# Patient Record
Sex: FEMALE | Race: WHITE | NOT HISPANIC OR LATINO | Employment: OTHER | URBAN - METROPOLITAN AREA
[De-identification: names, ages, dates, MRNs, and addresses within clinical notes are randomized per-mention and may not be internally consistent; named-entity substitution may affect disease eponyms.]

---

## 2017-01-24 ENCOUNTER — OFFICE VISIT (OUTPATIENT)
Dept: OBGYN | Facility: CLINIC | Age: 22
End: 2017-01-24
Payer: COMMERCIAL

## 2017-01-24 VITALS
HEART RATE: 68 BPM | WEIGHT: 147 LBS | SYSTOLIC BLOOD PRESSURE: 110 MMHG | BODY MASS INDEX: 27.75 KG/M2 | HEIGHT: 61 IN | DIASTOLIC BLOOD PRESSURE: 66 MMHG

## 2017-01-24 DIAGNOSIS — N93.8 DUB (DYSFUNCTIONAL UTERINE BLEEDING): Primary | ICD-10-CM

## 2017-01-24 PROCEDURE — 99213 OFFICE O/P EST LOW 20 MIN: CPT | Performed by: OBSTETRICS & GYNECOLOGY

## 2017-01-24 RX ORDER — NORETHINDRONE ACETATE AND ETHINYL ESTRADIOL .03; 1.5 MG/1; MG/1
1 TABLET ORAL DAILY
Qty: 112 TABLET | Refills: 3 | Status: SHIPPED | OUTPATIENT
Start: 2017-01-24 | End: 2017-12-31

## 2017-01-24 NOTE — PROGRESS NOTES
SUBJECTIVE:                                                   Vanessa Turcios is a 21 year old female who presents to clinic today for the following health issue(s):  Patient presents with:  RECHECK: here to follow up on BC-has been having headaches on most days.      HPI:  Patient had iud for 2 yrs, lot of pain and breakthrough bleeding so was removed  Then tried apri but lots of breakthrough bleeding and anxiety, horowitz & headaches  Tried trisprintec but constant bleeding but headaches gone  Then sprintec but now bleeding and daily headaches    Discussed options  Declines nuvaring, patch, paraguard, depoprovera and implanon  Doesn't want just condoms     We are left with trying another ocp and continuous to see if we can stop the bleeding  Prescription for loestrin 1.5/30  Daily     If signif bleeding, stop for 3 days only then resume    Student at  chiropractic school    Patient's last menstrual period was 2016..   Patient is sexually active, .  Using oral contraceptives for contraception.    reports that she has never smoked. She has never used smokeless tobacco.    STD testing offered?  Declined    Health maintenance updated:  yes    Today's PHQ-2 Score:   PHQ-2 (  [employer]) 2017   Q1: Little interest or pleasure in doing things 0   Q2: Feeling down, depressed or hopeless 0   PHQ-2 Score 0     Today's PHQ-9 Score:   PHQ-9 SCORE 2016   Total Score 2     Today's BRITTANY-7 Score:   BRITTANY-7 SCORE 2016   Total Score 0       Problem list and histories reviewed & adjusted, as indicated.  Additional history: as documented.    Patient Active Problem List   Diagnosis     Genital herpes     Uses oral contraception     Past Surgical History   Procedure Laterality Date     No history of surgery        Social History   Substance Use Topics     Smoking status: Never Smoker      Smokeless tobacco: Never Used     Alcohol Use: No      Problem (# of Occurrences) Relation (Name,Age of Onset)    Colon  "Cancer (1) Maternal Grandmother    DIABETES (1) Maternal Grandmother            Current Outpatient Prescriptions   Medication Sig     norethindrone-ethinyl estradiol (MICROGESTIN 1.5/30) 1.5-30 MG-MCG per tablet Take 1 tablet by mouth daily Without placebo week, fill 4 pkg for 3 months     norgestimate-ethinyl estradiol (ORTHO-CYCLEN, SPRINTEC) 0.25-35 MG-MCG per tablet Take 1 tablet by mouth daily     valACYclovir (VALTREX) 500 MG tablet Take 1 tablet (500 mg) by mouth 2 times daily (Patient taking differently: Take 500 mg by mouth as needed )     No current facility-administered medications for this visit.     No Known Allergies    ROS:  12 point review of systems negative other than symptoms noted below.  Genitourinary: Irregular Menses  Neurologic: Headaches    OBJECTIVE:     /66 mmHg  Pulse 68  Ht 5' 0.5\" (1.537 m)  Wt 147 lb (66.679 kg)  BMI 28.23 kg/m2  LMP 01/13/2016  Body mass index is 28.23 kg/(m^2).    Exam:  Constitutional:  Appearance: Well nourished, well developed alert, in no acute distress     In-Clinic Test Results:  No results found for this or any previous visit (from the past 24 hour(s)).    ASSESSMENT/PLAN:                                                        ICD-10-CM    1. DUB (dysfunctional uterine bleeding) N93.8 norethindrone-ethinyl estradiol (MICROGESTIN 1.5/30) 1.5-30 MG-MCG per tablet       There are no Patient Instructions on file for this visit.    Face to face time 15 minute   100% counceling  Discussed multiple contraceptive options  Discussed hormonal manipulation and DUB    Missy Yoo MD  [facility]    "

## 2017-05-05 ENCOUNTER — TELEPHONE (OUTPATIENT)
Dept: OBGYN | Facility: CLINIC | Age: 22
End: 2017-05-05

## 2017-05-09 NOTE — TELEPHONE ENCOUNTER
Called pt back. Informed her VM box was full so we were unable to LM. Pt stated she had called because she had been on a new OCP x 3 months and started having hot flashes that were worsening the last month. Pt states the past 2 days however have been better. Informed pt it can take time for body to get use to new hormone with OCPs, and laloley her body is adjusting to the new OCP. Informed pt if symptoms come back or worsen to call back. Pt stated understanding and had no further questions.

## 2017-06-03 ENCOUNTER — HEALTH MAINTENANCE LETTER (OUTPATIENT)
Age: 22
End: 2017-06-03

## 2017-12-31 DIAGNOSIS — N93.8 DUB (DYSFUNCTIONAL UTERINE BLEEDING): ICD-10-CM

## 2018-01-02 RX ORDER — NORETHINDRONE ACETATE AND ETHINYL ESTRADIOL 1.5; 3 MG/1; UG/1
TABLET ORAL
Qty: 28 TABLET | Refills: 0 | Status: SHIPPED | OUTPATIENT
Start: 2018-01-02 | End: 2018-01-30

## 2018-01-02 NOTE — TELEPHONE ENCOUNTER
JUNEL 1.5/30      Last Written Prescription Date:  1/24/17  Last Fill Quantity: 112,   # refills: 3  Last Office Visit: 1/27/17-DUB raimundo, 12/2/16-annual  Future Office visit:   none    Medication is being filled for 1 time refill only due to:  Patient needs to be seen because it has been more than one year since last visit.   Pt due for annual, no appt scheduled. One month extension sent per [place] protocol.

## 2018-01-21 DIAGNOSIS — N93.8 DUB (DYSFUNCTIONAL UTERINE BLEEDING): ICD-10-CM

## 2018-01-23 RX ORDER — NORETHINDRONE ACETATE AND ETHINYL ESTRADIOL 1.5; 3 MG/1; UG/1
TABLET ORAL
Qty: 21 TABLET | Refills: 0 | OUTPATIENT
Start: 2018-01-23

## 2018-01-23 NOTE — TELEPHONE ENCOUNTER
JUNEL 1.5/30      Last Written Prescription Date:  1/2/18  Last Fill Quantity: 28,   # refills: 0  Last Office Visit: 12/2/16  Future Office visit:   NONE    Pt due for annual, no appt scheduled. Pt already received one month extension. Rx denied.

## 2018-01-30 ENCOUNTER — OFFICE VISIT (OUTPATIENT)
Dept: OBGYN | Facility: CLINIC | Age: 23
End: 2018-01-30
Payer: COMMERCIAL

## 2018-01-30 VITALS
DIASTOLIC BLOOD PRESSURE: 76 MMHG | BODY MASS INDEX: 29.45 KG/M2 | WEIGHT: 156 LBS | SYSTOLIC BLOOD PRESSURE: 122 MMHG | HEIGHT: 61 IN

## 2018-01-30 DIAGNOSIS — Z11.8 SCREENING FOR CHLAMYDIAL DISEASE: ICD-10-CM

## 2018-01-30 DIAGNOSIS — N93.8 DUB (DYSFUNCTIONAL UTERINE BLEEDING): ICD-10-CM

## 2018-01-30 DIAGNOSIS — A60.04 HERPES SIMPLEX VULVOVAGINITIS: ICD-10-CM

## 2018-01-30 DIAGNOSIS — Z01.419 ENCOUNTER FOR GYNECOLOGICAL EXAMINATION WITHOUT ABNORMAL FINDING: Primary | ICD-10-CM

## 2018-01-30 PROCEDURE — 87491 CHLMYD TRACH DNA AMP PROBE: CPT | Performed by: OBSTETRICS & GYNECOLOGY

## 2018-01-30 PROCEDURE — 99395 PREV VISIT EST AGE 18-39: CPT | Performed by: OBSTETRICS & GYNECOLOGY

## 2018-01-30 RX ORDER — VALACYCLOVIR HYDROCHLORIDE 500 MG/1
500 TABLET, FILM COATED ORAL 2 TIMES DAILY
Qty: 6 TABLET | Refills: 3 | Status: SHIPPED | OUTPATIENT
Start: 2018-01-30 | End: 2020-07-10

## 2018-01-30 RX ORDER — NORETHINDRONE ACETATE AND ETHINYL ESTRADIOL .03; 1.5 MG/1; MG/1
TABLET ORAL
Qty: 112 TABLET | Refills: 4 | Status: SHIPPED | OUTPATIENT
Start: 2018-01-30 | End: 2019-02-04

## 2018-01-30 ASSESSMENT — ANXIETY QUESTIONNAIRES
3. WORRYING TOO MUCH ABOUT DIFFERENT THINGS: NOT AT ALL
1. FEELING NERVOUS, ANXIOUS, OR ON EDGE: SEVERAL DAYS
6. BECOMING EASILY ANNOYED OR IRRITABLE: NOT AT ALL
7. FEELING AFRAID AS IF SOMETHING AWFUL MIGHT HAPPEN: NOT AT ALL
5. BEING SO RESTLESS THAT IT IS HARD TO SIT STILL: NOT AT ALL
GAD7 TOTAL SCORE: 1
IF YOU CHECKED OFF ANY PROBLEMS ON THIS QUESTIONNAIRE, HOW DIFFICULT HAVE THESE PROBLEMS MADE IT FOR YOU TO DO YOUR WORK, TAKE CARE OF THINGS AT HOME, OR GET ALONG WITH OTHER PEOPLE: NOT DIFFICULT AT ALL
2. NOT BEING ABLE TO STOP OR CONTROL WORRYING: NOT AT ALL

## 2018-01-30 ASSESSMENT — PATIENT HEALTH QUESTIONNAIRE - PHQ9: 5. POOR APPETITE OR OVEREATING: NOT AT ALL

## 2018-01-30 NOTE — MR AVS SNAPSHOT
"              After Visit Summary   2018    Vanessa Turcios    MRN: [id number]           Patient Information     Date Of Birth          1995        Visit Information        Provider Department      2018 9:30 AM Missy Yoo MD [facility]        Today's Diagnoses     Encounter for gynecological examination without abnormal finding    -  1    DUB (dysfunctional uterine bleeding)        Herpes simplex vulvovaginitis        Screening for chlamydial disease           Follow-ups after your visit        Who to contact     If you have questions or need follow up information about today's clinic visit or your schedule please contact [facility] directly at [phone].  Normal or non-critical lab and imaging results will be communicated to you by MyChart, letter or phone within 4 business days after the clinic has received the results. If you do not hear from us within 7 days, please contact the clinic through MyChart or phone. If you have a critical or abnormal lab result, we will notify you by phone as soon as possible.  Submit refill requests through [employer] or call your pharmacy and they will forward the refill request to us. Please allow 3 business days for your refill to be completed.          Additional Information About Your Visit        MyChart Information     [employer] lets you send messages to your doctor, view your test results, renew your prescriptions, schedule appointments and more. To sign up, go to [email or web address] . Click on \"Log in\" on the left side of the screen, which will take you to the Welcome page. Then click on \"Sign up Now\" on the right side of the page.     You will be asked to enter the access code listed below, as well as some personal information. Please follow the directions to create your username and password.     Your access code is: [email or web address]  Expires: 2018 10:13 AM     Your access code will  in 90 days. If " "you need help or a new code, please call your [place] clinic or [phone].        Care EveryWhere ID     This is your Care EveryWhere ID. This could be used by other organizations to access your [place] medical records  [id number]        Your Vitals Were     Height Last Period Breastfeeding? BMI (Body Mass Index)          5' 1.25\" (1.556 m) 07/02/2017 (Approximate) No 29.24 kg/m2         Blood Pressure from Last 3 Encounters:   01/30/18 122/76   01/24/17 110/66   12/02/16 118/72    Weight from Last 3 Encounters:   01/30/18 156 lb (70.8 kg)   01/24/17 147 lb (66.7 kg)   12/02/16 145 lb (65.8 kg)              We Performed the Following     CHLAMYDIA TRACHOMATIS PCR          Today's Medication Changes          These changes are accurate as of 1/30/18 10:14 AM.  If you have any questions, ask your nurse or doctor.               These medicines have changed or have updated prescriptions.        Dose/Directions    norethindrone-ethinyl estradiol 1.5-30 MG-MCG per tablet   Commonly known as:  JUNEL 1.5/30   This may have changed:  See the new instructions.   Used for:  DUB (dysfunctional uterine bleeding)   Changed by:  Missy Yoo MD        TAKE 1 TABLET BY MOUTH DAILY WITHOUT PLACEBO WEEK   Quantity:  112 tablet   Refills:  4       valACYclovir 500 MG tablet   Commonly known as:  VALTREX   This may have changed:  See the new instructions.   Used for:  Herpes simplex vulvovaginitis   Changed by:  Missy Yoo MD        Dose:  500 mg   Take 1 tablet (500 mg) by mouth 2 times daily   Quantity:  6 tablet   Refills:  3            Where to get your medicines      These medications were sent to [place] [id number] [facility] [place] - [address]  [address] [place] [address]     Phone:  [phone]     norethindrone-ethinyl estradiol 1.5-30 MG-MCG per tablet    valACYclovir 500 MG tablet                Primary Care Provider Office Phone # Fax #    Missy Yoo [phone] [facility] [address]  [facility] [address]        Equal Access to Services     JOSE WRIGHT : Hadii aad ku hadjoangordo Tamayo, wachristine pittman, namitapawel moralezmadavid alex, deisy workman. So [facility] [phone].    ATENCIÓN: Si habla español, tiene a mims disposición servicios gratuitos de asistencia lingüística. Llame al [phone].    We comply with applicable federal civil rights laws and [place] laws. We do not discriminate on the basis of race, color, national origin, age, disability, sex, sexual orientation, or gender identity.            Thank you!     Thank you for choosing [facility]  for your care. Our goal is always to provide you with excellent care. Hearing back from our patients is one way we can continue to improve our services. Please take a few minutes to complete the written survey that you may receive in the mail after your visit with us. Thank you!             Your Updated Medication List - Protect others around you: Learn how to safely use, store and throw away your medicines at www.disposemymeds.org.          This list is accurate as of 1/30/18 10:14 AM.  Always use your most recent med list.                   Brand Name Dispense Instructions for use Diagnosis    norethindrone-ethinyl estradiol 1.5-30 MG-MCG per tablet    JUNEL 1.5/30    112 tablet    TAKE 1 TABLET BY MOUTH DAILY WITHOUT PLACEBO WEEK    DUB (dysfunctional uterine bleeding)       valACYclovir 500 MG tablet    VALTREX    6 tablet    Take 1 tablet (500 mg) by mouth 2 times daily    Herpes simplex vulvovaginitis

## 2018-01-30 NOTE — PROGRESS NOTES
Vanessa is a 22 year old  female who presents for annual exam.     Besides routine health maintenance, she has no other health concerns today .    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  Her current contraception method is: oral contraceptives.  She  reports that she has never smoked. She has never used smokeless tobacco.    Patient is sexually active.  STD testing offered?  Declined  Last PHQ-9 score on record =   PHQ-9 SCORE 2018   Total Score 0     Last GAD7 score on record =   BRITTANY-7 SCORE 2018   Total Score 1     Alcohol Score = 3    HEALTH MAINTENANCE:  Cholesterol: (No results found for: CHOL NA  Last Mammo: NA, Result: not applicable, Next Mammo: NA   Pap: (  Lab Results   Component Value Date    PAP NIL 2016 WNL   Colonoscopy:  NA, Result: not applicable, Next Colonoscopy: NA years.  Dexa:  NA    Health maintenance updated:  yes    HISTORY:  Obstetric History       T0      L0     SAB0   TAB0   Ectopic0   Multiple0   Live Births0           Patient Active Problem List   Diagnosis     Genital herpes     Uses oral contraception     Past Surgical History:   Procedure Laterality Date     NO HISTORY OF SURGERY        Social History   Substance Use Topics     Smoking status: Never Smoker     Smokeless tobacco: Never Used     Alcohol use No      Problem (# of Occurrences) Relation (Name,Age of Onset)    Colon Cancer (1) Maternal Grandmother    DIABETES (1) Maternal Grandmother            Current Outpatient Prescriptions   Medication Sig     JUNEL 1.5/30 1.5-30 MG-MCG per tablet TAKE 1 TABLET BY MOUTH DAILY WITHOUT PLACEBO WEEK     valACYclovir (VALTREX) 500 MG tablet TAKE ONE TABLET BY MOUTH  "TWICE DAILY     No current facility-administered medications for this visit.      No Known Allergies    Past medical, surgical, social and family histories were reviewed and updated in EPIC.    ROS:   Genitourinary: Cramps, No Periods and Spotting  Skin: Rash and Skin Dryness    EXAM:  /76  Ht 5' 1.25\" (1.556 m)  Wt 156 lb (70.8 kg)  LMP 07/02/2017 (Approximate)  Breastfeeding? No  BMI 29.24 kg/m2   BMI: Body mass index is 29.24 kg/(m^2).    PHYSICAL EXAM:  Constitutional:  Appearance: Well nourished, well developed, alert, in no acute distress  Neck:  Lymph Nodes:  No lymphadenopathy present    Thyroid:  Gland size normal, nontender, no nodules or masses present  on palpation  Chest:  Respiratory Effort:  Breathing unlabored  Cardiovascular:    Heart: Auscultation:  Regular rate, normal rhythm, no murmurs present  Breasts: Inspection of Breasts:  No lymphadenopathy present., Palpation of Breasts and Axillae:  No masses present on palpation, no breast tenderness., Axillary Lymph Nodes:  No lymphadenopathy present. and No nodularity, asymmetry or nipple discharge bilaterally.  Gastrointestinal:   Abdominal Examination:  Abdomen nontender to palpation, tone normal without rigidity or guarding, no masses present, umbilicus without lesions   Liver and Spleen:  No hepatomegaly present, liver nontender to palpation    Hernias:  No hernias present  Lymphatic: Lymph Nodes:  No other lymphadenopathy present  Skin:  General Inspection:  No rashes present, no lesions present, no areas of  discoloration    Genitalia and Groin:  No rashes present, no lesions present, no areas of  discoloration, no masses present  Neurologic/Psychiatric:    Mental Status:  Oriented X3     Pelvic Exam:  External Genitalia:     Normal appearance for age, no discharge present, no tenderness present, no inflammatory lesions present, color normal  Vagina:     Normal vaginal vault without central or paravaginal defects, no discharge present, " no inflammatory lesions present, no masses present  Bladder:     Nontender to palpation  Urethra:   Urethral Body:  Urethra palpation normal, urethra structural support normal   Urethral Meatus:  No erythema or lesions present  Cervix:     Appearance healthy, no lesions present, nontender to palpation, no bleeding present  Uterus:     Uterus: firm, normal sized and nontender, anteverted in position.   Adnexa:     No adnexal tenderness present, no adnexal masses present  Perineum:     Perineum within normal limits, no evidence of trauma, no rashes or skin lesions present  Anus:     Anus within normal limits, no hemorrhoids present  Inguinal Lymph Nodes:     No lymphadenopathy present  Pubic Hair:     Normal pubic hair distribution for age  Genitalia and Groin:     No rashes present, no lesions present, no areas of discoloration, no masses present      COUNSELING:   Reviewed preventive health counseling, as reflected in patient instructions       Regular exercise       Healthy diet/nutrition    BMI: Body mass index is 29.24 kg/(m^2).  Weight management plan: Patient referred to endocrine and/or weight management specialty    ASSESSMENT:  22 year old female with satisfactory annual exam.    ICD-10-CM    1. Encounter for gynecological examination without abnormal finding Z01.419        PLAN:  See HPI    Missy Yoo MD

## 2018-01-31 LAB
C TRACH DNA SPEC QL NAA+PROBE: NEGATIVE
SPECIMEN SOURCE: NORMAL

## 2018-01-31 ASSESSMENT — PATIENT HEALTH QUESTIONNAIRE - PHQ9: SUM OF ALL RESPONSES TO PHQ QUESTIONS 1-9: 0

## 2018-01-31 ASSESSMENT — ANXIETY QUESTIONNAIRES: GAD7 TOTAL SCORE: 1

## 2019-01-16 DIAGNOSIS — N93.8 DUB (DYSFUNCTIONAL UTERINE BLEEDING): ICD-10-CM

## 2019-01-17 RX ORDER — NORETHINDRONE ACETATE AND ETHINYL ESTRADIOL .03; 1.5 MG/1; MG/1
TABLET ORAL
Qty: 441 TABLET | Refills: 0 | OUTPATIENT
Start: 2019-01-17

## 2019-01-17 NOTE — TELEPHONE ENCOUNTER
Clinic Action Needed:No/FYI only    Reason for Call: Vanessa called tonight requesting a refill on her BC pills.  She is due for an office visit/annual physical on 1/30/2019.  She is headed to [place] tomorrow for a long weekend and needs her refill tonight.  Her pharmacy that requested the refill has a broken printer and cannot process or fill any scripts tonight.  Phoned in a one month keeley refill (as previously written in Epic) per RN refill protocol to [address].  Verbal order accepted by pharmacist Rick.  Notified Vanessa a one month supply was phoned in to pharmacy, advised to make an appointment ASAP.      Routed to: WE Triage    Nichole Acuna RN  [facility]

## 2019-02-04 ENCOUNTER — OFFICE VISIT (OUTPATIENT)
Dept: OBGYN | Facility: CLINIC | Age: 24
End: 2019-02-04
Payer: COMMERCIAL

## 2019-02-04 VITALS
WEIGHT: 151.8 LBS | HEIGHT: 60 IN | SYSTOLIC BLOOD PRESSURE: 110 MMHG | DIASTOLIC BLOOD PRESSURE: 74 MMHG | BODY MASS INDEX: 29.8 KG/M2

## 2019-02-04 DIAGNOSIS — Z01.419 ENCOUNTER FOR GYNECOLOGICAL EXAMINATION WITHOUT ABNORMAL FINDING: Primary | ICD-10-CM

## 2019-02-04 DIAGNOSIS — N93.8 DUB (DYSFUNCTIONAL UTERINE BLEEDING): ICD-10-CM

## 2019-02-04 PROCEDURE — G0145 SCR C/V CYTO,THINLAYER,RESCR: HCPCS | Performed by: NURSE PRACTITIONER

## 2019-02-04 PROCEDURE — 99395 PREV VISIT EST AGE 18-39: CPT | Performed by: NURSE PRACTITIONER

## 2019-02-04 RX ORDER — NORETHINDRONE ACETATE AND ETHINYL ESTRADIOL .03; 1.5 MG/1; MG/1
TABLET ORAL
Qty: 112 TABLET | Refills: 4 | Status: SHIPPED | OUTPATIENT
Start: 2019-02-04 | End: 2020-03-25

## 2019-02-04 ASSESSMENT — MIFFLIN-ST. JEOR: SCORE: 1365.06

## 2019-02-04 NOTE — PROGRESS NOTES
Vanessa is a 23 year old  female who presents for annual exam.     Besides routine health maintenance, she has no other health concerns today .    HPI:here for annual exam.  Doing well on continous OCP's.  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  No concerns today.  Due for pap this year.  The patient's PCP is  Msisy Yoo MD.        GYNECOLOGIC HISTORY:    No LMP recorded (lmp unknown). Patient is not currently having periods (Reason: Birth Control).  Her current contraception method is: oral contraceptives.  She  reports that  has never smoked. she has never used smokeless tobacco.    Patient is sexually active.  STD testing offered?  Declined  Last PHQ-9 score on record =   PHQ-9 SCORE 2018   PHQ-9 Total Score 0     Last GAD7 score on record =   BRITTANY-7 SCORE 2018   Total Score 1     Alcohol Score = 5    HEALTH MAINTENANCE:  Cholesterol: (No results found for: CHOL   Last Mammo: NA, Result: not applicable, Next Mammo: Age 40   Pap:   Lab Results   Component Value Date    PAP NIL 2016      Colonoscopy:  NA, Result: not applicable, Next Colonoscopy: 50 years.  Dexa:  Never    Health maintenance updated:  yes    HISTORY:  Obstetric History       T0      L0     SAB0   TAB0   Ectopic0   Multiple0   Live Births0           Patient Active Problem List   Diagnosis     Genital herpes     Uses oral contraception     Past Surgical History:   Procedure Laterality Date     NO HISTORY OF SURGERY        Social History     Tobacco Use     Smoking status: Never Smoker     Smokeless tobacco: Never Used   Substance Use Topics     Alcohol use: No     Alcohol/week: 0.0 oz      Problem (# of Occurrences) Relation (Name,Age of Onset)    Colon Cancer (1) Maternal Grandmother    Diabetes (1) Maternal Grandmother            Current Outpatient Medications   Medication Sig     norethindrone-ethinyl estradiol (JUNEL 1.5/30) 1.5-30 MG-MCG tablet TAKE 1 TABLET BY MOUTH DAILY WITHOUT PLACEBO WEEK      valACYclovir (VALTREX) 500 MG tablet Take 1 tablet (500 mg) by mouth 2 times daily     No current facility-administered medications for this visit.      No Known Allergies    Past medical, surgical, social and family histories were reviewed and updated in EPIC.    ROS:   12 point review of systems negative other than symptoms noted below.  Skin: Acne and Skin Dryness  Endocrine: Decreased Libido    EXAM:  /74   Ht 1.524 m (5')   Wt 68.9 kg (151 lb 12.8 oz)   LMP  (LMP Unknown)   Breastfeeding? No   BMI 29.65 kg/m     BMI: Body mass index is 29.65 kg/m .    PHYSICAL EXAM:  Constitutional:  Appearance: Well nourished, well developed, alert, in no acute distress  Neck:  Lymph Nodes:  No lymphadenopathy present    Thyroid:  Gland size normal, nontender, no nodules or masses present  on palpation  Chest:  Respiratory Effort:  Breathing unlabored  Cardiovascular:    Heart: Auscultation:  Regular rate, normal rhythm, no murmurs present  Breasts: Inspection of Breasts:  No lymphadenopathy present., Palpation of Breasts and Axillae:  No masses present on palpation, no breast tenderness., Axillary Lymph Nodes:  No lymphadenopathy present. and No nodularity, asymmetry or nipple discharge bilaterally.  Gastrointestinal:   Abdominal Examination:  Abdomen nontender to palpation, tone normal without rigidity or guarding, no masses present, umbilicus without lesions   Liver and Spleen:  No hepatomegaly present, liver nontender to palpation    Hernias:  No hernias present  Lymphatic: Lymph Nodes:  No other lymphadenopathy present  Skin:  General Inspection:  No rashes present, no lesions present, no areas of  discoloration    Genitalia and Groin:  No rashes present, no lesions present, no areas of  discoloration, no masses present  Neurologic/Psychiatric:    Mental Status:  Oriented X3     Pelvic Exam:  External Genitalia:     Normal appearance for age, no discharge present, no tenderness present, no inflammatory  lesions present, color normal  Vagina:     Normal vaginal vault without central or paravaginal defects, no discharge present, no inflammatory lesions present, no masses present  Bladder:     Nontender to palpation  Urethra:   Urethral Body:  Urethra palpation normal, urethra structural support normal   Urethral Meatus:  No erythema or lesions present  Cervix:     Appearance healthy, no lesions present, nontender to palpation, no bleeding present  Uterus:     Uterus: firm, normal sized and nontender, anteverted in position.   Adnexa:     No adnexal tenderness present, no adnexal masses present  Perineum:     Perineum within normal limits, no evidence of trauma, no rashes or skin lesions present  Anus:     Anus within normal limits, no hemorrhoids present  Inguinal Lymph Nodes:     No lymphadenopathy present  Pubic Hair:     Normal pubic hair distribution for age  Genitalia and Groin:     No rashes present, no lesions present, no areas of discoloration, no masses present      COUNSELING:   Reviewed preventive health counseling, as reflected in patient instructions       Regular exercise       Healthy diet/nutrition       Contraception       Safe sex practices/STD prevention    BMI: Body mass index is 29.65 kg/m .      ASSESSMENT:  23 year old female with satisfactory annual exam.    ICD-10-CM    1. Encounter for gynecological examination without abnormal finding Z01.419 Pap imaged thin layer screen only - recommended age 21 - 24 years   2. DUB (dysfunctional uterine bleeding) N93.8 norethindrone-ethinyl estradiol (JUNEL 1.5/30) 1.5-30 MG-MCG tablet       PLAN:  Normal Gyn exam. Ok to continue OC's for 1 year.    Return prn or 1 year for annual exam.    Missy Yoo MD

## 2019-02-04 NOTE — LETTER
February 12, 2019      Vanessa Turcios  [address] [place] [address]    Dear Turcios,      I am happy to inform you that your recent cervical cancer screening test (PAP smear) was normal.      Preventative screenings such as this help to ensure your health for years to come. You should repeat a pap smear in 3 years, unless otherwise directed.      You will still need to return to the clinic every year for your annual exam and other preventive tests.     If you have additional questions regarding this result, please call our registered nurse, Livier at [phone].      Sincerely,      Lilia Salguero, APRN CNP/rlm

## 2019-02-07 LAB
COPATH REPORT: NORMAL
PAP: NORMAL

## 2019-10-01 ENCOUNTER — RX ONLY (RX ONLY)
Age: 24
End: 2019-10-01

## 2019-10-01 RX ORDER — DESOXIMETASONE 2.5 MG/G
0.25% CREAM TOPICAL TID
Qty: 1 | Refills: 0 | Status: CANCELLED
Stop reason: CLARIF

## 2019-10-03 ENCOUNTER — APPOINTMENT (OUTPATIENT)
Age: 24
Setting detail: DERMATOLOGY
End: 2019-10-08

## 2019-10-03 VITALS — HEIGHT: 60.5 IN | WEIGHT: 150 LBS | RESPIRATION RATE: 14 BRPM

## 2019-10-03 DIAGNOSIS — L20.89 OTHER ATOPIC DERMATITIS: ICD-10-CM

## 2019-10-03 PROBLEM — L20.84 INTRINSIC (ALLERGIC) ECZEMA: Status: ACTIVE | Noted: 2019-10-03

## 2019-10-03 PROCEDURE — OTHER PRESCRIPTION: OTHER

## 2019-10-03 PROCEDURE — 99214 OFFICE O/P EST MOD 30 MIN: CPT

## 2019-10-03 PROCEDURE — OTHER COUNSELING: OTHER

## 2019-10-03 RX ORDER — DESOXIMETASONE 0.5 MG/G
0.05% CREAM TOPICAL BID
Qty: 1 | Refills: 5 | Status: ERX | COMMUNITY
Start: 2019-10-03

## 2019-10-03 RX ORDER — DESOXIMETASONE 2.5 MG/G
0.25% CREAM TOPICAL BID
Qty: 1 | Refills: 5 | Status: ERX | COMMUNITY
Start: 2019-10-03

## 2019-10-03 ASSESSMENT — LOCATION DETAILED DESCRIPTION DERM
LOCATION DETAILED: RIGHT SUPERIOR PARIETAL SCALP
LOCATION DETAILED: RIGHT DORSAL MIDDLE FINGER METACARPOPHALANGEAL JOINT
LOCATION DETAILED: LEFT SUPERIOR POSTERIOR NECK
LOCATION DETAILED: 3RD WEB SPACE LEFT HAND
LOCATION DETAILED: LEFT INFERIOR ANTERIOR NECK
LOCATION DETAILED: RIGHT SUPERIOR POSTERIOR NECK

## 2019-10-03 ASSESSMENT — LOCATION SIMPLE DESCRIPTION DERM
LOCATION SIMPLE: LEFT ANTERIOR NECK
LOCATION SIMPLE: POSTERIOR NECK
LOCATION SIMPLE: SCALP
LOCATION SIMPLE: RIGHT HAND
LOCATION SIMPLE: LEFT HAND

## 2019-10-03 ASSESSMENT — LOCATION ZONE DERM
LOCATION ZONE: SCALP
LOCATION ZONE: NECK
LOCATION ZONE: HAND

## 2020-03-25 ENCOUNTER — TELEPHONE (OUTPATIENT)
Dept: OBGYN | Facility: CLINIC | Age: 25
End: 2020-03-25

## 2020-03-25 DIAGNOSIS — N93.8 DUB (DYSFUNCTIONAL UTERINE BLEEDING): ICD-10-CM

## 2020-03-25 RX ORDER — NORETHINDRONE ACETATE AND ETHINYL ESTRADIOL .03; 1.5 MG/1; MG/1
TABLET ORAL
Qty: 112 TABLET | Refills: 0 | Status: SHIPPED | OUTPATIENT
Start: 2020-03-25 | End: 2020-07-10

## 2020-03-25 NOTE — TELEPHONE ENCOUNTER
Requested Prescriptions   Pending Prescriptions Disp Refills     norethindrone-ethinyl estradiol (JUNEL 1.5/30) 1.5-30 MG-MCG tablet 112 tablet 4     Sig: TAKE 1 TABLET BY MOUTH DAILY WITHOUT PLACEBO WEEK       There is no refill protocol information for this order        Last Written Prescription Date:  2/4/19  Last Fill Quantity: 112,  # refills: 4   Last office visit: 2/4/2019 with prescribing provider:  KEYUR Salguero NP   Future Office Visit:  7/10/20 with Dr Egan    Prescription approved per [facility] Refill Protocol.  COVID19    Radha Guillen RN on 3/25/2020 at 11:49 AM

## 2020-07-10 ENCOUNTER — OFFICE VISIT (OUTPATIENT)
Dept: OBGYN | Facility: CLINIC | Age: 25
End: 2020-07-10
Payer: COMMERCIAL

## 2020-07-10 VITALS
HEIGHT: 60 IN | WEIGHT: 164 LBS | DIASTOLIC BLOOD PRESSURE: 72 MMHG | HEART RATE: 66 BPM | SYSTOLIC BLOOD PRESSURE: 110 MMHG | BODY MASS INDEX: 32.2 KG/M2

## 2020-07-10 DIAGNOSIS — Z01.419 ENCOUNTER FOR GYNECOLOGICAL EXAMINATION WITHOUT ABNORMAL FINDING: Primary | ICD-10-CM

## 2020-07-10 DIAGNOSIS — N93.8 DUB (DYSFUNCTIONAL UTERINE BLEEDING): ICD-10-CM

## 2020-07-10 DIAGNOSIS — A60.04 HERPES SIMPLEX VULVOVAGINITIS: ICD-10-CM

## 2020-07-10 PROCEDURE — 99395 PREV VISIT EST AGE 18-39: CPT | Performed by: OBSTETRICS & GYNECOLOGY

## 2020-07-10 RX ORDER — NORETHINDRONE ACETATE AND ETHINYL ESTRADIOL .03; 1.5 MG/1; MG/1
TABLET ORAL
Qty: 112 TABLET | Refills: 4 | Status: SHIPPED | OUTPATIENT
Start: 2020-07-10 | End: 2021-06-17

## 2020-07-10 RX ORDER — MULTIVIT-MIN/IRON/FOLIC ACID/K 18-600-40
CAPSULE ORAL
COMMUNITY
End: 2021-09-10

## 2020-07-10 RX ORDER — VALACYCLOVIR HYDROCHLORIDE 500 MG/1
500 TABLET, FILM COATED ORAL 2 TIMES DAILY
Qty: 6 TABLET | Refills: 3 | Status: SHIPPED | OUTPATIENT
Start: 2020-07-10 | End: 2021-09-10

## 2020-07-10 ASSESSMENT — ANXIETY QUESTIONNAIRES
5. BEING SO RESTLESS THAT IT IS HARD TO SIT STILL: NOT AT ALL
1. FEELING NERVOUS, ANXIOUS, OR ON EDGE: NOT AT ALL
7. FEELING AFRAID AS IF SOMETHING AWFUL MIGHT HAPPEN: SEVERAL DAYS
6. BECOMING EASILY ANNOYED OR IRRITABLE: NOT AT ALL
GAD7 TOTAL SCORE: 1
3. WORRYING TOO MUCH ABOUT DIFFERENT THINGS: NOT AT ALL
IF YOU CHECKED OFF ANY PROBLEMS ON THIS QUESTIONNAIRE, HOW DIFFICULT HAVE THESE PROBLEMS MADE IT FOR YOU TO DO YOUR WORK, TAKE CARE OF THINGS AT HOME, OR GET ALONG WITH OTHER PEOPLE: NOT DIFFICULT AT ALL
2. NOT BEING ABLE TO STOP OR CONTROL WORRYING: NOT AT ALL

## 2020-07-10 ASSESSMENT — PATIENT HEALTH QUESTIONNAIRE - PHQ9
5. POOR APPETITE OR OVEREATING: NOT AT ALL
SUM OF ALL RESPONSES TO PHQ QUESTIONS 1-9: 0

## 2020-07-10 ASSESSMENT — MIFFLIN-ST. JEOR: SCORE: 1415.4

## 2020-07-10 NOTE — PROGRESS NOTES
Vanessa is a 24 year old  female who presents for annual exam.     Besides routine health maintenance, she has no other health concerns today .    HPI:  Here today for yearly exam --doing well.  Amenorrheic with continuous OCPs.  Rare breakthrough bleeding if not taking pill consistently.  +SA --no issues.  Hx HSV --maybe one outbreak per year.  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 (Menstrual status: Birth Control).      Her current contraception method is: oral contraceptives.  She  reports that she has never smoked. She has never used smokeless tobacco.    Patient is sexually active.  STD testing offered?  Declined  Last PHQ-9 score on record =   PHQ-9 SCORE 7/10/2020   PHQ-9 Total Score 0     Last GAD7 score on record =   BRITTANY-7 SCORE 7/10/2020   Total Score 1     Alcohol Score = 4    HEALTH MAINTENANCE:  Cholesterol: (No results found for: CHOL   Last Mammo: Not applicable, Result: Not applicable, Next Mammo: Due at age 40   Pap:   Lab Results   Component Value Date    PAP NIL 2019    PAP NIL 2016 WNL   Colonoscopy:  NA, Result: Not applicable, Next Colonoscopy: NA years.  Dexa:  NA    Health maintenance updated:  yes    HISTORY:  OB History    Para Term  AB Living   0 0 0 0 0 0   SAB TAB Ectopic Multiple Live Births   0 0 0 0 0       Patient Active Problem List   Diagnosis     Genital herpes     Uses oral contraception     Past Surgical History:   Procedure Laterality Date     NO HISTORY OF SURGERY        Social History     Tobacco Use     Smoking status: Never Smoker     Smokeless tobacco: Never Used   Substance Use Topics     Alcohol use: No      Alcohol/week: 0.0 standard drinks      Problem (# of Occurrences) Relation (Name,Age of Onset)    Colon Cancer (1) Maternal Grandmother    Diabetes (1) Maternal Grandmother    No Known Problems (7) Mother, Father, Sister, Brother, Maternal Grandfather, Paternal Grandmother, Other            Current Outpatient Medications   Medication Sig     Ascorbic Acid (VITAMIN C) 500 MG CAPS      cod liver oil CAPS capsule      norethindrone-ethinyl estradiol (JUNEL 1.5/30) 1.5-30 MG-MCG tablet TAKE 1 TABLET BY MOUTH DAILY WITHOUT PLACEBO WEEK     valACYclovir (VALTREX) 500 MG tablet Take 1 tablet (500 mg) by mouth 2 times daily     No current facility-administered medications for this visit.      No Known Allergies    Past medical, surgical, social and family histories were reviewed and updated in EPIC.    ROS:   12 point review of systems negative other than symptoms noted below or in the HPI.  No urinary frequency or dysuria, bladder or kidney problems    EXAM:  /72   Pulse 66   Ht 1.524 m (5')   Wt 74.4 kg (164 lb)   Breastfeeding No   BMI 32.03 kg/m     BMI: Body mass index is 32.03 kg/m .    PHYSICAL EXAM:  Constitutional:   Appearance: Well nourished, well developed, alert, in no acute distress  Neck:  Lymph Nodes:  No lymphadenopathy present    Thyroid:  Gland size normal, nontender, no nodules or masses present  on palpation  Chest:  Respiratory Effort:  Breathing unlabored  Cardiovascular:    Heart: Auscultation:  Regular rate, normal rhythm, no murmurs present  Breasts: Inspection of Breasts:  No lymphadenopathy present., Palpation of Breasts and Axillae:  No masses present on palpation, no breast tenderness., Axillary Lymph Nodes:  No lymphadenopathy present. and No nodularity, asymmetry or nipple discharge bilaterally.  Gastrointestinal:   Abdominal Examination:  Abdomen nontender to palpation, tone normal without rigidity or guarding, no masses present, umbilicus without lesions   Liver and Spleen:  No  hepatomegaly present, liver nontender to palpation    Hernias:  No hernias present  Lymphatic: Lymph Nodes:  No other lymphadenopathy present  Skin:  General Inspection:  No rashes present, no lesions present, no areas of  discoloration  Neurologic:    Mental Status:  Oriented X3.  Normal strength and tone, sensory exam                grossly normal, mentation intact and speech normal.    Psychiatric:   Mentation appears normal and affect normal/bright.         Pelvic Exam:  External Genitalia:     Normal appearance for age, no discharge present, no tenderness present, no inflammatory lesions present, color normal  Vagina:     Normal vaginal vault without central or paravaginal defects, no discharge present, no inflammatory lesions present, no masses present  Bladder:     Nontender to palpation  Urethra:   Urethral Body:  Urethra palpation normal, urethra structural support normal   Urethral Meatus:  No erythema or lesions present  Cervix:     Appearance healthy, no lesions present, nontender to palpation, no bleeding present  Uterus:     Uterus: firm, normal sized and nontender, anteverted in position.   Adnexa:     No adnexal tenderness present, no adnexal masses present  Perineum:     Perineum within normal limits, no evidence of trauma, no rashes or skin lesions present  Anus:     Anus within normal limits, no hemorrhoids present  Inguinal Lymph Nodes:     No lymphadenopathy present  Pubic Hair:     Normal pubic hair distribution for age  Genitalia and Groin:     No rashes present, no lesions present, no areas of discoloration, no masses present      COUNSELING:   Reviewed preventive health counseling, as reflected in patient instructions  Special attention given to:        Regular exercise       Healthy diet/nutrition       Contraception    BMI: Body mass index is 32.03 kg/m .  Weight management plan: Discussed healthy diet and exercise guidelines    ASSESSMENT:  24 year old female with satisfactory annual  exam.    ICD-10-CM    1. Encounter for gynecological examination without abnormal finding  Z01.419    2. Herpes simplex vulvovaginitis  A60.04 valACYclovir (VALTREX) 500 MG tablet   3. DUB (dysfunctional uterine bleeding)  N93.8 norethindrone-ethinyl estradiol (JUNEL 1.5/30) 1.5-30 MG-MCG tablet       PLAN:  Patient Instructions   Follow up with your primary care provider for your other medical problems.  Continue self breast exam.  Increase physical activity and exercise.  Usual safety and preventative measures counseling done.  Last pap smear (2019) was normal.  No pap was obtained this year.  This was discussed with the patient and she agrees with the plan.      Mela Egan MD

## 2020-07-10 NOTE — PATIENT INSTRUCTIONS
Follow up with your primary care provider for your other medical problems.  Continue self breast exam.  Increase physical activity and exercise.  Usual safety and preventative measures counseling done.  Last pap smear (2019) was normal.  No pap was obtained this year.  This was discussed with the patient and she agrees with the plan.

## 2020-07-11 ASSESSMENT — ANXIETY QUESTIONNAIRES: GAD7 TOTAL SCORE: 1

## 2020-08-08 ENCOUNTER — COMMUNICATION - HEALTHEAST (OUTPATIENT)
Dept: SCHEDULING | Facility: CLINIC | Age: 25
End: 2020-08-08

## 2020-12-15 ENCOUNTER — APPOINTMENT (OUTPATIENT)
Dept: URBAN - METROPOLITAN AREA CLINIC 256 | Age: 25
Setting detail: DERMATOLOGY
End: 2020-12-15

## 2020-12-15 VITALS — RESPIRATION RATE: 16 BRPM | HEIGHT: 60 IN | WEIGHT: 160 LBS

## 2020-12-15 DIAGNOSIS — L20.89 OTHER ATOPIC DERMATITIS: ICD-10-CM

## 2020-12-15 PROBLEM — L20.84 INTRINSIC (ALLERGIC) ECZEMA: Status: ACTIVE | Noted: 2020-12-15

## 2020-12-15 PROCEDURE — OTHER COUNSELING: OTHER

## 2020-12-15 PROCEDURE — 99214 OFFICE O/P EST MOD 30 MIN: CPT

## 2020-12-15 PROCEDURE — OTHER PRESCRIPTION: OTHER

## 2020-12-15 RX ORDER — DESOXIMETASONE 2.5 MG/G
OINTMENT TOPICAL BID
Qty: 1 | Refills: 1 | Status: ERX | COMMUNITY
Start: 2020-12-15

## 2020-12-15 ASSESSMENT — LOCATION SIMPLE DESCRIPTION DERM
LOCATION SIMPLE: RIGHT HAND
LOCATION SIMPLE: LEFT HAND

## 2020-12-15 ASSESSMENT — LOCATION DETAILED DESCRIPTION DERM
LOCATION DETAILED: LEFT ULNAR DORSAL HAND
LOCATION DETAILED: RIGHT ULNAR DORSAL HAND

## 2020-12-15 ASSESSMENT — LOCATION ZONE DERM: LOCATION ZONE: HAND

## 2021-06-10 NOTE — TELEPHONE ENCOUNTER
RN Triage:       Patient is concerned about covid19  Yesterday patient woke up with sore throat.   Body aches and chills  Nasal congestion.   Unable to take her temperature.   Advised [email or web address]  Yanelis Fraser RN, BSN Care Connection Triage Nurse      COVID 19 Nurse Triage Plan/Patient Instructions    Please be aware that novel coronavirus (COVID-19) may be circulating in the community. If you develop symptoms such as fever, cough, or SOB or if you have concerns about the presence of another infection including coronavirus (COVID-19), please contact your health care provider or visit [email or web address].     Disposition/Instructions    Virtual Visit with provider recommended. Reference Visit Selection Guide.    Thank you for taking steps to prevent the spread of this virus.  o Limit your contact with others.  o Wear a simple mask to cover your cough.  o Wash your hands well and often.    Resources    [facility]: About COVID-19: [email or web address]    CDC: What to Do If You're Sick: www.cdc.gov/coronavirus/2019-ncov/about/steps-when-sick.html    CDC: Ending Home Isolation: www.cdc.gov/coronavirus/2019-ncov/hcp/disposition-in-home-patients.html     CDC: Caring for Someone: www.cdc.gov/coronavirus/2019-ncov/if-you-are-sick/care-for-someone.html     [facility]: Interim Guidance for Hospital Discharge to Home: www.health.[facility].mn.us/diseases/coronavirus/hcp/hospdischarge.pdf    [facility] clinical trials (COVID-19 research studies): clinicalaffairs.[facility]/umn-clinical-trials     Below are the COVID-19 hotlines at the [facility] of [facility] ([facility]). Interpreters are available.   o For health questions: Call 737-446-8998 or 1-617.370.4738 (7 a.m. to 7 p.m.)  o For questions about schools and childcare: Call 351-835-5075 or 1-877.785.1666 (7 a.m. to 7 p.m.)       COVID 19 Nurse Triage Plan/Patient Instructions    Please be aware that novel coronavirus (COVID-19) may be circulating in the  community. If you develop symptoms such as fever, cough, or SOB or if you have concerns about the presence of another infection including coronavirus (COVID-19), please contact your health care provider or visit [email or web address].     Disposition/Instructions    Additional COVID19 information to add for patients.   How can I protect others?  If you have symptoms (fever, cough, body aches or trouble breathing): Stay home and away from others (self-isolate) until:    At least 10 days have passed since your symptoms started. And     You ve had no fever--and no medicine that reduces fever--for 3 full days (72 hours). And      Your other symptoms have resolved (gotten better).     If you don t have symptoms, but a test showed that you have COVID-19 (you tested positive):    Stay home and away from others (self-isolate) until at least 10 days have passed since the date of your first positive COVID-19 test.    During this time:    Stay in your own room, even for meals. Use your own bathroom if you can.     Stay away from others in your home. No hugging, kissing or shaking hands. No visitors.    Don t go to work, school or anywhere else.     Clean  high touch  surfaces often (doorknobs, counters, handles, etc.). Use a household cleaning spray or wipes. You ll find a full list on the EPA website:  www.epa.gov/pesticide-registration/list-n-disinfectants-use-against-sars-cov-2.    Cover your mouth and nose with a mask, tissue or washcloth to avoid spreading germs.    Wash your hands and face often. Use soap and water.    Caregivers in these groups are at risk for severe illness due to COVID-19:  o People 65 years and older  o People who live in a nursing home or long-term care facility  o People with chronic disease (lung, heart, cancer, diabetes, kidney, liver, immunologic)  o People who have a weakened immune system, including those who:  - Are in cancer treatment  - Take medicine that weakens the immune system, such as  corticosteroids  - Had a bone marrow or organ transplant  - Have an immune deficiency  - Have poorly controlled HIV or AIDS  - Are obese (body mass index of 40 or higher)  - Smoke regularly    Caregivers should wear gloves while washing dishes, handling laundry and cleaning bedrooms and bathrooms.    Use caution when washing and drying laundry: Don t shake dirty laundry, and use the warmest water setting that you can.    For more tips, go to www.cdc.gov/coronavirus/2019-ncov/downloads/10Things.pdf.    How can I take care of myself?  1. Get lots of rest. Drink extra fluids (unless a doctor has told you not to).     2. Take Tylenol (acetaminophen) for fever or pain. If you have liver or kidney problems, ask your family doctor if it s okay to take Tylenol.     Adults can take either:     650 mg (two 325 mg pills) every 4 to 6 hours, or     1,000 mg (two 500 mg pills) every 8 hours as needed.     Note: Don t take more than 3,000 mg in one day.   Acetaminophen is found in many medicines (both prescribed and over-the-counter medicines). Read all labels to be sure you don t take too much.     For children, check the Tylenol bottle for the right dose. The dose is based on the child s age or weight.    3. If you have other health problems (like cancer, heart failure, an organ transplant or severe kidney disease): Call your specialty clinic if you don t feel better in the next 2 days.    4. Know when to call 911: Emergency warning signs include:    Trouble breathing or shortness of breath    Pain or pressure in the chest that doesn t go away    Feeling confused like you haven t felt before, or not being able to wake up    Bluish-colored lips or face    What are the symptoms of COVID-19?     The most common symptoms are cough, fever and trouble breathing.     Less common symptoms include body aches, chills, diarrhea (loose, watery poops), fatigue (feeling very tired), headache, runny nose, sore throat and loss of  smell.    COVID-19 can cause severe coughing (bronchitis) and lung infection (pneumonia).    How does it spread?     The virus may spread when a person coughs or sneezes into the air. The virus can travel about 6 feet this way, and it can live on surfaces.      Common  (household disinfectants) will kill the virus.    Who is at risk?  Anyone can catch COVID-19 if they re around someone who has the virus.    How can others protect themselves?     Stay away from people who have COVID-19 (or symptoms of COVID-19).    Wash hands often with soap and water. Or, use hand  with at least 60% alcohol.    Avoid touching the eyes, nose or mouth.     Wear a face mask when you go out in public, when sick or when caring for a sick person.    Where can I get more information?    [facility]: About COVID-19: [email or web address]    CDC: What to Do If You re Sick: www.cdc.gov/coronavirus/2019-ncov/about/steps-when-sick.html    CDC: Ending Home Isolation: www.cdc.gov/coronavirus/2019-ncov/hcp/disposition-in-home-patients.html     CDC: Caring for Someone: www.cdc.gov/coronavirus/2019-ncov/if-you-are-sick/care-for-someone.html    [facility]: Interim Guidance for Hospital Discharge to Home: www.health.[facility].mn.us/diseases/coronavirus/hcp/hospdischarge.pdf    [facility] clinical trials (COVID-19 research studies): clinicalaffairs.[facility]-clinical-trials     Below are the COVID-19 hotlines at the [place] Department of Health ([facility]). Interpreters are available.   o For health questions: Call 777-330-5935 or 1-193.304.2078 (7 a.m. to 7 p.m.)  o For questions about schools and childcare: Call 625-287-4610 or 1-423.166.1792 (7 a.m. to 7 p.m.)            Thank you for taking steps to prevent the spread of this virus.  o Limit your contact with others.  o Wear a simple mask to cover your cough.  o Wash your hands well and often.    Resources    [facility]: About COVID-19:  [email or web address]    CDC: What to Do If You're Sick: www.cdc.gov/coronavirus/2019-ncov/about/steps-when-sick.html    CDC: Ending Home Isolation: www.cdc.gov/coronavirus/2019-ncov/hcp/disposition-in-home-patients.html     CDC: Caring for Someone: www.cdc.gov/coronavirus/2019-ncov/if-you-are-sick/care-for-someone.html     [facility]: Interim Guidance for Hospital Discharge to Home: www.health.[facility].mn./diseases/coronavirus/hcp/hospdischarge.pdf    [facility] clinical trials (COVID-19 research studies): clinicalaffairs.[facility]-clinical-trials     Below are the COVID-19 hotlines at the [place] Department of Health ([facility]). Interpreters are available.   o For health questions: Call 102-165-6436 or 1-148.508.9220 (7 a.m. to 7 p.m.)  o For questions about schools and childcare: Call 394-090-3664 or 1-945.679.9171 (7 a.m. to 7 p.m.)   `9    Reason for Disposition    [1] COVID-19 infection suspected by caller or triager AND [2] mild symptoms (cough, fever, or others) AND [3] no complications or SOB    Additional Information    Negative: SEVERE difficulty breathing (e.g., struggling for each breath, speaks in single words)    Negative: Difficult to awaken or acting confused (e.g., disoriented, slurred speech)    Negative: Bluish (or gray) lips or face now    Negative: Shock suspected (e.g., cold/pale/clammy skin, too weak to stand, low BP, rapid pulse)    Negative: Sounds like a life-threatening emergency to the triager    Negative: [1] COVID-19 exposure AND [2] no symptoms    Negative: COVID-19 and Breastfeeding, questions about    Negative: [1] Adult with possible COVID-19 symptoms AND [2] triager concerned about severity of symptoms or other causes    Negative: SEVERE or constant chest pain or pressure (Exception: mild central chest pain, present only when coughing)    Negative: MODERATE difficulty breathing (e.g., speaks in phrases, SOB even at rest, pulse 100-120)    Negative: Patient sounds  very sick or weak to the triager    Negative: MILD difficulty breathing (e.g., minimal/no SOB at rest, SOB with walking, pulse <100)    Negative: Chest pain or pressure    Negative: Fever > 103 F (39.4 C)    Negative: [1] Fever > 101 F (38.3 C) AND [2] age > 60    Negative: [1] Fever > 100.0 F (37.8 C) AND [2] bedridden (e.g., nursing home patient, CVA, chronic illness, recovering from surgery)    Negative: HIGH RISK patient (e.g., age > 64 years, diabetes, heart or lung disease, weak immune system)    Negative: Fever present > 3 days (72 hours)    Negative: [1] Fever returns after gone for over 24 hours AND [2] symptoms worse or not improved    Negative: [1] Continuous (nonstop) coughing interferes with work or school AND [2] no improvement using cough treatment per protocol    Protocols used: CORONAVIRUS (COVID-19) DIAGNOSED OR [id number] 5.16.20

## 2021-06-17 DIAGNOSIS — N93.8 DUB (DYSFUNCTIONAL UTERINE BLEEDING): ICD-10-CM

## 2021-06-17 RX ORDER — NORETHINDRONE ACETATE AND ETHINYL ESTRADIOL .03; 1.5 MG/1; MG/1
TABLET ORAL
Qty: 28 TABLET | Refills: 0 | Status: SHIPPED | OUTPATIENT
Start: 2021-06-17 | End: 2021-08-19

## 2021-06-17 NOTE — TELEPHONE ENCOUNTER
Requested Prescriptions   Pending Prescriptions Disp Refills     norethindrone-ethinyl estradiol (JUNEL 1.5/30) 1.5-30 MG-MCG tablet 112 tablet 4     Sig: TAKE 1 TABLET BY MOUTH DAILY WITHOUT PLACEBO WEEK       There is no refill protocol information for this order        Last Written Prescription Date:  7/10/20  Last Fill Quantity: 112,  # refills: 4   Last office visit: 7/10/2020 with prescribing provider:  Dr Egan   Future Office Visit:  None    Refill sent for one month   Appointment needed for further refills  Taty Cervantes RN on 6/17/2021 at 2:35 PM

## 2021-08-19 DIAGNOSIS — N93.8 DUB (DYSFUNCTIONAL UTERINE BLEEDING): ICD-10-CM

## 2021-08-19 RX ORDER — NORETHINDRONE ACETATE AND ETHINYL ESTRADIOL 1.5-30(21)
KIT ORAL
Qty: 28 TABLET | OUTPATIENT
Start: 2021-08-19

## 2021-08-19 RX ORDER — NORETHINDRONE ACETATE AND ETHINYL ESTRADIOL .03; 1.5 MG/1; MG/1
TABLET ORAL
Qty: 28 TABLET | Refills: 0 | Status: SHIPPED | OUTPATIENT
Start: 2021-08-19 | End: 2021-09-07

## 2021-08-19 NOTE — TELEPHONE ENCOUNTER
"Requested Prescriptions   Pending Prescriptions Disp Refills     norethindrone-ethinyl estradiol-iron (JUNEL FE 1.5/30) 1.5-30 MG-MCG tablet [Pharmacy Med Name: JUNEL FE 1.5/30 TABLETS 28S] 28 tablet      Sig: TAKE ONE TABLET BY MOUTH DAILY WITHOUT PLACEBO WEEK.       Contraceptives Protocol Failed - 8/19/2021  6:25 AM        Failed - Recent (12 mo) or future (30 days) visit within the authorizing provider's specialty     Patient has had an office visit with the authorizing provider or a provider within the authorizing providers department within the previous 12 mos or has a future within next 30 days. See \"Patient Info\" tab in inbasket, or \"Choose Columns\" in Meds & Orders section of the refill encounter.              Failed - Medication is active on med list        Passed - Patient is not a current smoker if age is 35 or older        Passed - No active pregnancy on record        Passed - No positive pregnancy test in past 12 months           Last Written Prescription Date:  6/17/21  Last Fill Quantity: 28,  # refills: 0   Last office visit: 7/10/2020 with prescribing provider:  Dr Egan   Future Office Visit:      Pt due for annual, no appt scheduled. Pt already received one month extension. Rx denied.   Radha Guillen RN on 8/19/2021 at 7:42 AM        "

## 2021-08-19 NOTE — TELEPHONE ENCOUNTER
Medication is being filled for 1 time refill only due to:  Patient needs to be seen because it has been more than one year since last visit. Annual scheduled.  Radha Guillen RN on 8/19/2021 at 9:12 AM

## 2021-09-06 DIAGNOSIS — N93.8 DUB (DYSFUNCTIONAL UTERINE BLEEDING): ICD-10-CM

## 2021-09-07 RX ORDER — NORETHINDRONE ACETATE AND ETHINYL ESTRADIOL 1.5; 3 MG/1; UG/1
TABLET ORAL
Qty: 21 TABLET | Refills: 0 | Status: SHIPPED | OUTPATIENT
Start: 2021-09-07 | End: 2021-09-10

## 2021-09-07 NOTE — TELEPHONE ENCOUNTER
"Requested Prescriptions   Pending Prescriptions Disp Refills     AUROVELA 1.5/30 1.5-30 MG-MCG tablet [Pharmacy Med Name: AUROVELA 1.5/30 TABLETS 21] 21 tablet      Sig: TAKE ONE TABLET BY MOUTH DAILY WITHOUT PLACEBO WEEK       Contraceptives Protocol Passed - 9/6/2021  3:22 AM        Passed - Patient is not a current smoker if age is 35 or older        Passed - Recent (12 mo) or future (30 days) visit within the authorizing provider's specialty     Patient has had an office visit with the authorizing provider or a provider within the authorizing providers department within the previous 12 mos or has a future within next 30 days. See \"Patient Info\" tab in inbasket, or \"Choose Columns\" in Meds & Orders section of the refill encounter.              Passed - Medication is active on med list        Passed - No active pregnancy on record        Passed - No positive pregnancy test in past 12 months           Last Written Prescription Date:  8/19/21  Last Fill Quantity: 28,  # refills: 0   Last office visit: 7/10/2020 with prescribing provider:  Dr Egan   Future Office Visit:   Next 5 appointments (look out 90 days)    Sep 10, 2021  2:20 PM  PHYSICAL with Mela Egan MD  [facility] [place] ([facility] [place] ) [address] [place] [id number] [phone]                 "

## 2021-09-07 NOTE — TELEPHONE ENCOUNTER
Medication is being filled for 1 time refill only due to:  Patient needs to be seen because due for annual.  Appointment scheduled.  Maribel Diaz RN on 9/7/2021 at 11:29 AM

## 2021-09-10 ENCOUNTER — OFFICE VISIT (OUTPATIENT)
Dept: OBGYN | Facility: CLINIC | Age: 26
End: 2021-09-10
Payer: COMMERCIAL

## 2021-09-10 VITALS
DIASTOLIC BLOOD PRESSURE: 74 MMHG | WEIGHT: 146 LBS | HEIGHT: 61 IN | SYSTOLIC BLOOD PRESSURE: 122 MMHG | BODY MASS INDEX: 27.56 KG/M2

## 2021-09-10 DIAGNOSIS — Z30.41 USES ORAL CONTRACEPTION: ICD-10-CM

## 2021-09-10 DIAGNOSIS — Z01.419 ENCOUNTER FOR GYNECOLOGICAL EXAMINATION WITHOUT ABNORMAL FINDING: Primary | ICD-10-CM

## 2021-09-10 DIAGNOSIS — N93.8 DUB (DYSFUNCTIONAL UTERINE BLEEDING): ICD-10-CM

## 2021-09-10 DIAGNOSIS — A60.04 HERPES SIMPLEX VULVOVAGINITIS: ICD-10-CM

## 2021-09-10 PROCEDURE — 99395 PREV VISIT EST AGE 18-39: CPT | Performed by: OBSTETRICS & GYNECOLOGY

## 2021-09-10 RX ORDER — VALACYCLOVIR HYDROCHLORIDE 500 MG/1
500 TABLET, FILM COATED ORAL 2 TIMES DAILY
Qty: 6 TABLET | Refills: 3 | Status: SHIPPED | OUTPATIENT
Start: 2021-09-10 | End: 2023-06-08

## 2021-09-10 RX ORDER — NORETHINDRONE ACETATE AND ETHINYL ESTRADIOL .03; 1.5 MG/1; MG/1
TABLET ORAL
Qty: 84 TABLET | Refills: 4 | Status: SHIPPED | OUTPATIENT
Start: 2021-09-10 | End: 2022-11-28

## 2021-09-10 ASSESSMENT — ANXIETY QUESTIONNAIRES
1. FEELING NERVOUS, ANXIOUS, OR ON EDGE: SEVERAL DAYS
6. BECOMING EASILY ANNOYED OR IRRITABLE: NOT AT ALL
3. WORRYING TOO MUCH ABOUT DIFFERENT THINGS: NOT AT ALL
IF YOU CHECKED OFF ANY PROBLEMS ON THIS QUESTIONNAIRE, HOW DIFFICULT HAVE THESE PROBLEMS MADE IT FOR YOU TO DO YOUR WORK, TAKE CARE OF THINGS AT HOME, OR GET ALONG WITH OTHER PEOPLE: SOMEWHAT DIFFICULT
5. BEING SO RESTLESS THAT IT IS HARD TO SIT STILL: NOT AT ALL
GAD7 TOTAL SCORE: 2
7. FEELING AFRAID AS IF SOMETHING AWFUL MIGHT HAPPEN: NOT AT ALL
2. NOT BEING ABLE TO STOP OR CONTROL WORRYING: SEVERAL DAYS

## 2021-09-10 ASSESSMENT — PATIENT HEALTH QUESTIONNAIRE - PHQ9
SUM OF ALL RESPONSES TO PHQ QUESTIONS 1-9: 0
5. POOR APPETITE OR OVEREATING: NOT AT ALL

## 2021-09-10 ASSESSMENT — MIFFLIN-ST. JEOR: SCORE: 1340.66

## 2021-09-10 NOTE — PROGRESS NOTES
Vanessa is a 25 year old  female who presents for annual exam.     Besides routine health maintenance, she has no other health concerns today .    HPI:  Here today for yearly exam --doing well.  Amenorrheic with continuous OCPs.  No breakthrough bleeding or spotting.  +SA --no issues/concerns.  Denies bowel/bladder issues.  Hx HSV --cannot remember the last time she needed to use her valtrex.  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 (Menstrual status: Birth Control).    Regular menses? Absent due to continuous OCP    Her current contraception method is: oral contraceptives.  She  reports that she has never smoked. She has never used smokeless tobacco.    Patient is sexually active.  STD testing offered?  Declined  Last PHQ-9 score on record =   PHQ-9 SCORE 9/10/2021   PHQ-9 Total Score 0     Last GAD7 score on record =   BRITTANY-7 SCORE 9/10/2021   Total Score 2     Alcohol Score = 2    HEALTH MAINTENANCE:  Cholesterol: (No results found for: CHOL   Last Mammo: Not applicable, Result: Not applicable, Next Mammo: Due at age 40   Pap:   Lab Results   Component Value Date    PAP NIL 2019    PAP NIL 2016 WNL   Colonoscopy:  NA, Result: Not applicable, Next Colonoscopy: NA years.  Dexa:  NA    Health maintenance updated:  yes    HISTORY:  OB History    Para Term  AB Living   0 0 0 0 0 0   SAB TAB Ectopic Multiple Live Births   0 0 0 0 0       Patient Active Problem List   Diagnosis     Genital herpes     Uses oral contraception     Past Surgical History:   Procedure Laterality Date      "NO HISTORY OF SURGERY        Social History     Tobacco Use     Smoking status: Never Smoker     Smokeless tobacco: Never Used   Substance Use Topics     Alcohol use: No     Alcohol/week: 0.0 standard drinks      Problem (# of Occurrences) Relation (Name,Age of Onset)    Breast Cancer (1) Paternal Aunt: 2020    Colon Cancer (1) Maternal Grandmother    Diabetes (1) Maternal Grandmother    No Known Problems (8) Mother, Father, Sister, Brother, Maternal Grandfather, Paternal Grandmother, Paternal Grandfather, Other            Current Outpatient Medications   Medication Sig     cod liver oil CAPS capsule      norethindrone-ethinyl estradiol (AUROVELA 1.5/30) 1.5-30 MG-MCG tablet TAKE ONE TABLET BY MOUTH DAILY WITHOUT PLACEBO WEEK     valACYclovir (VALTREX) 500 MG tablet Take 1 tablet (500 mg) by mouth 2 times daily     No current facility-administered medications for this visit.     No Known Allergies    Past medical, surgical, social and family histories were reviewed and updated in EPIC.    ROS:   12 point review of systems negative other than symptoms noted below or in the HPI.  No urinary frequency or dysuria, bladder or kidney problems    EXAM:  /74   Ht 1.543 m (5' 0.75\")   Wt 66.2 kg (146 lb)   Breastfeeding No   BMI 27.81 kg/m     BMI: Body mass index is 27.81 kg/m .    PHYSICAL EXAM:  Constitutional:   Appearance: Well nourished, well developed, alert, in no acute distress  Neck:  Lymph Nodes:  No lymphadenopathy present    Thyroid:  Gland size normal, nontender, no nodules or masses present  on palpation  Chest:  Respiratory Effort:  Breathing unlabored  Cardiovascular:    Heart: Auscultation:  Regular rate, normal rhythm, no murmurs present  Breasts: Inspection of Breasts:  No lymphadenopathy present., Palpation of Breasts and Axillae:  No masses present on palpation, no breast tenderness., Axillary Lymph Nodes:  No lymphadenopathy present. and No nodularity, asymmetry or nipple discharge " bilaterally.  Gastrointestinal:   Abdominal Examination:  Abdomen nontender to palpation, tone normal without rigidity or guarding, no masses present, umbilicus without lesions   Liver and Spleen:  No hepatomegaly present, liver nontender to palpation    Hernias:  No hernias present  Lymphatic: Lymph Nodes:  No other lymphadenopathy present  Skin:  General Inspection:  No rashes present, no lesions present, no areas of  discoloration  Neurologic:    Mental Status:  Oriented X3.  Normal strength and tone, sensory exam                grossly normal, mentation intact and speech normal.    Psychiatric:   Mentation appears normal and affect normal/bright.         Pelvic Exam:  External Genitalia:     Normal appearance for age, no discharge present, no tenderness present, no inflammatory lesions present, color normal  Vagina:     Normal vaginal vault without central or paravaginal defects, no discharge present, no inflammatory lesions present, no masses present  Bladder:     Nontender to palpation  Urethra:   Urethral Body:  Urethra palpation normal, urethra structural support normal   Urethral Meatus:  No erythema or lesions present  Cervix:     Appearance healthy, no lesions present, nontender to palpation, no bleeding present  Uterus:     Uterus: firm, normal sized and nontender, anteverted in position.   Adnexa:     No adnexal tenderness present, no adnexal masses present  Perineum:     Perineum within normal limits, no evidence of trauma, no rashes or skin lesions present  Anus:     Anus within normal limits, no hemorrhoids present  Inguinal Lymph Nodes:     No lymphadenopathy present  Pubic Hair:     Normal pubic hair distribution for age  Genitalia and Groin:     No rashes present, no lesions present, no areas of discoloration, no masses present      COUNSELING:   Reviewed preventive health counseling, as reflected in patient instructions  Special attention given to:        Regular exercise       Healthy  diet/nutrition       Contraception    BMI: Body mass index is 27.81 kg/m .  Weight management plan: Discussed healthy diet and exercise guidelines    ASSESSMENT:  25 year old female with satisfactory annual exam.    ICD-10-CM    1. Encounter for gynecological examination without abnormal finding  Z01.419    2. Herpes simplex vulvovaginitis  A60.04 valACYclovir (VALTREX) 500 MG tablet   3. DUB (dysfunctional uterine bleeding)  N93.8 norethindrone-ethinyl estradiol (AUROVELA 1.5/30) 1.5-30 MG-MCG tablet   4. Uses oral contraception  Z30.41        PLAN:  Patient Instructions   Follow up with your primary care provider for your other medical problems.  Continue self breast exam.  Increase physical activity and exercise.  Usual safety and preventative measures counseling done.  Last pap smear (2019) was normal.  No pap was obtained this year.  This was discussed with the patient and she agrees with the plan.      Mela Egan MD

## 2021-09-11 ASSESSMENT — ANXIETY QUESTIONNAIRES: GAD7 TOTAL SCORE: 2

## 2022-03-24 ENCOUNTER — OFFICE VISIT (OUTPATIENT)
Dept: URGENT CARE | Facility: URGENT CARE | Age: 27
End: 2022-03-24
Payer: COMMERCIAL

## 2022-03-24 VITALS
SYSTOLIC BLOOD PRESSURE: 130 MMHG | TEMPERATURE: 98 F | OXYGEN SATURATION: 98 % | DIASTOLIC BLOOD PRESSURE: 81 MMHG | HEART RATE: 61 BPM

## 2022-03-24 DIAGNOSIS — S91.332A PUNCTURE WOUND OF LEFT FOOT, INITIAL ENCOUNTER: Primary | ICD-10-CM

## 2022-03-24 PROCEDURE — 90715 TDAP VACCINE 7 YRS/> IM: CPT

## 2022-03-24 PROCEDURE — 99203 OFFICE O/P NEW LOW 30 MIN: CPT | Mod: 25

## 2022-03-24 PROCEDURE — 90471 IMMUNIZATION ADMIN: CPT

## 2022-03-24 RX ORDER — CEPHALEXIN 500 MG/1
500 CAPSULE ORAL 3 TIMES DAILY
Qty: 21 CAPSULE | Refills: 0 | Status: SHIPPED | OUTPATIENT
Start: 2022-03-24 | End: 2023-06-08

## 2022-03-24 NOTE — PROGRESS NOTES
Chief Complaint   Patient presents with     Urgent Care     Laceration     left foot         ICD-10-CM    1. Puncture wound of left foot, initial encounter  S91.332A TDAP VACCINE (Adacel, Boostrix)  [[id number]]     cephALEXin (KEFLEX) 500 MG capsule     Updated patient's tetanus.  She will start antibiotics if there are any signs of infection that appear.    Subjective     Vanessa Turcios is an 26 year old female who presents to clinic today for  Puncture wound to the left foot around 530 today . She stepped on a flor thumbtack.      Objective    /81 (BP Location: Right arm, Patient Position: Sitting, Cuff Size: Adult Regular)   Pulse 61   Temp 98  F (36.7  C) (Oral)   SpO2 98%   Breastfeeding No   Nurses notes and VS have been reviewed.    Physical Exam   GENERAL: Alert, vigorous, is in no acute distress.  SKIN: skin is clear, no rash or abnormal pigmentation, unable to see puncture wound on the left foot, likely has closed already with a very superficial puncture  EXTREMITIES: Symmetric extremities no deformities      Patient Instructions   Start antibiotics for any signs of infection.    Patient Education     Puncture Wound: Foot       A puncture wound occurs when a pointed object (such as a nail) pushes into the skin. It may go into the tissues below the skin of the foot, including fat and muscle. This type of wound is narrow and deep. They can be hard to clean. Puncture wounds are at high risk for becoming infected. One type of serious infection is more likely if you were wearing a rubber-soled shoe at the time of injury. Bacteria from the sole of the shoe may be dragged into the wound. Symptoms of infection may appear as late as 2 to 3 weeks after the injury. Be sure to watch for symptoms of infection and call your healthcare provider right away if any them appear.  X-rays may be done to see whether any objects remain under the skin. Your may also need a tetanus shot. This is given if you are not  up-to-date on this vaccination and the object that caused the wound may lead to tetanus.  Puncture wounds can easily become infected.   Home care    When you sit or lie down, raise the foot above the level of your heart. This helps reduce swelling and pain.    Don't put weight on the injured foot if it hurts to do so or if you were told to keep weight off the injury.    Your healthcare provider may prescribe an antibiotic. This is to help prevent infection. Follow all instructions for taking this medicine. Take the medicine every day until it is gone or you are told to stop. You should not have any left over.    The healthcare provider may prescribe medicines for pain. Follow instructions for taking them.    You can take acetaminophen or ibuprofen for pain, unless you were given a different pain medicine to use.     Follow the healthcare provider s instructions on how to care for the wound.    Keep the wound clean and dry. Don't get the wound wet until you are told it is OK to do so. If the area gets wet, gently pat it dry with a clean cloth. Replace the wet bandage with a dry one.    If a bandage was applied and it becomes wet or dirty, replace it. Otherwise, leave it in place for the first 24 hours.    Once you can get the wound wet, you may shower as usual but don't soak the wound in water (no tub baths or swimming)    Check the wound daily for symptoms of infection. These include:  ? Increasing redness or swelling around the wound  ? Increased warmth of the wound  ? Worsening pain  ? Red streaking lines away from the wound  ? Draining pus  Follow-up care  Follow up with your healthcare provider, or as advised.   When to seek medical advice  Call your healthcare provider right away if any of these occur:    Any symptoms of infection (listed above)    Fever of 100.4 F (38. C) or higher, or as directed by your healthcare provider    Wound changes colors    Numbness around the wound    Decreased movement around the  injured area  [employer] last reviewed this educational content on 8/1/2018 2000-2021 [employer]. All rights reserved. This information is not intended as a substitute for professional medical care. Always follow your healthcare professional's instructions.               ANISH Odell, [facility] Urgent Care Provider    The use of Dragon/Identified dictation services may have been used to construct the content in this note; any grammatical or spelling errors are non-intentional. Please contact the author of this note directly if you are in need of any clarification.

## 2022-03-25 NOTE — PATIENT INSTRUCTIONS
Start antibiotics for any signs of infection.    Patient Education     Puncture Wound: Foot       A puncture wound occurs when a pointed object (such as a nail) pushes into the skin. It may go into the tissues below the skin of the foot, including fat and muscle. This type of wound is narrow and deep. They can be hard to clean. Puncture wounds are at high risk for becoming infected. One type of serious infection is more likely if you were wearing a rubber-soled shoe at the time of injury. Bacteria from the sole of the shoe may be dragged into the wound. Symptoms of infection may appear as late as 2 to 3 weeks after the injury. Be sure to watch for symptoms of infection and call your healthcare provider right away if any them appear.  X-rays may be done to see whether any objects remain under the skin. Your may also need a tetanus shot. This is given if you are not up-to-date on this vaccination and the object that caused the wound may lead to tetanus.  Puncture wounds can easily become infected.   Home care    When you sit or lie down, raise the foot above the level of your heart. This helps reduce swelling and pain.    Don't put weight on the injured foot if it hurts to do so or if you were told to keep weight off the injury.    Your healthcare provider may prescribe an antibiotic. This is to help prevent infection. Follow all instructions for taking this medicine. Take the medicine every day until it is gone or you are told to stop. You should not have any left over.    The healthcare provider may prescribe medicines for pain. Follow instructions for taking them.    You can take acetaminophen or ibuprofen for pain, unless you were given a different pain medicine to use.     Follow the healthcare provider s instructions on how to care for the wound.    Keep the wound clean and dry. Don't get the wound wet until you are told it is OK to do so. If the area gets wet, gently pat it dry with a clean cloth. Replace the  wet bandage with a dry one.    If a bandage was applied and it becomes wet or dirty, replace it. Otherwise, leave it in place for the first 24 hours.    Once you can get the wound wet, you may shower as usual but don't soak the wound in water (no tub baths or swimming)    Check the wound daily for symptoms of infection. These include:  ? Increasing redness or swelling around the wound  ? Increased warmth of the wound  ? Worsening pain  ? Red streaking lines away from the wound  ? Draining pus  Follow-up care  Follow up with your healthcare provider, or as advised.   When to seek medical advice  Call your healthcare provider right away if any of these occur:    Any symptoms of infection (listed above)    Fever of 100.4 F (38. C) or higher, or as directed by your healthcare provider    Wound changes colors    Numbness around the wound    Decreased movement around the injured area  [employer] last reviewed this educational content on 8/1/2018 2000-2021 [employer]. All rights reserved. This information is not intended as a substitute for professional medical care. Always follow your healthcare professional's instructions.

## 2022-04-01 ENCOUNTER — APPOINTMENT (OUTPATIENT)
Dept: URBAN - METROPOLITAN AREA CLINIC 255 | Age: 27
Setting detail: DERMATOLOGY
End: 2022-04-05

## 2022-04-01 DIAGNOSIS — L20.89 OTHER ATOPIC DERMATITIS: ICD-10-CM

## 2022-04-01 PROBLEM — L20.84 INTRINSIC (ALLERGIC) ECZEMA: Status: ACTIVE | Noted: 2022-04-01

## 2022-04-01 PROCEDURE — 99213 OFFICE O/P EST LOW 20 MIN: CPT

## 2022-04-01 PROCEDURE — OTHER COUNSELING: OTHER

## 2022-04-01 PROCEDURE — OTHER PRESCRIPTION: OTHER

## 2022-04-01 RX ORDER — DESOXIMETASONE 2.5 MG/G
OINTMENT TOPICAL
Qty: 60 | Refills: 12 | Status: ERX

## 2022-04-01 ASSESSMENT — LOCATION ZONE DERM
LOCATION ZONE: NECK
LOCATION ZONE: HAND
LOCATION ZONE: AXILLAE
LOCATION ZONE: SCALP

## 2022-04-01 ASSESSMENT — LOCATION SIMPLE DESCRIPTION DERM
LOCATION SIMPLE: LEFT HAND
LOCATION SIMPLE: HAIR
LOCATION SIMPLE: POSTERIOR NECK
LOCATION SIMPLE: LEFT AXILLARY VAULT
LOCATION SIMPLE: RIGHT AXILLARY VAULT
LOCATION SIMPLE: RIGHT HAND

## 2022-04-01 ASSESSMENT — LOCATION DETAILED DESCRIPTION DERM
LOCATION DETAILED: LEFT RADIAL DORSAL HAND
LOCATION DETAILED: HAIR
LOCATION DETAILED: LEFT AXILLARY VAULT
LOCATION DETAILED: LEFT INFERIOR POSTERIOR NECK
LOCATION DETAILED: RIGHT AXILLARY VAULT
LOCATION DETAILED: RIGHT RADIAL DORSAL HAND

## 2022-04-01 ASSESSMENT — BSA RASH: BSA RASH: 3

## 2022-04-18 RX ORDER — DESOXIMETASONE 2.5 MG/G
OINTMENT TOPICAL
Qty: 60 | Refills: 12 | Status: ERX

## 2022-11-27 DIAGNOSIS — N93.8 DUB (DYSFUNCTIONAL UTERINE BLEEDING): ICD-10-CM

## 2022-11-28 RX ORDER — NORETHINDRONE ACETATE AND ETHINYL ESTRADIOL 1.5; .03 MG/1; MG/1
TABLET ORAL
Qty: 28 TABLET | Refills: 0 | Status: SHIPPED | OUTPATIENT
Start: 2022-11-28 | End: 2022-12-23

## 2022-11-28 NOTE — TELEPHONE ENCOUNTER
"Requested Prescriptions   Pending Prescriptions Disp Refills     ANTOINETTE 1.5/30 1.5-30 MG-MCG tablet [Pharmacy Med Name: ANTOINETTE 1.5/30 TABLETS 21] 84 tablet 4     Sig: TAKE ONE TABLET BY MOUTH DAILY WITHOUT PLACEBO WEEK       Contraceptives Protocol Failed - 11/27/2022  3:16 AM        Failed - Recent (12 mo) or future (30 days) visit within the authorizing provider's specialty     Patient has had an office visit with the authorizing provider or a provider within the authorizing providers department within the previous 12 mos or has a future within next 30 days. See \"Patient Info\" tab in inbasket, or \"Choose Columns\" in Meds & Orders section of the refill encounter.              Passed - Patient is not a current smoker if age is 35 or older        Passed - Medication is active on med list        Passed - No active pregnancy on record        Passed - No positive pregnancy test in past 12 months           One month approved  Appointment needed for further refills  Taty Cervantes RN on 11/28/2022 at 9:52 AM    "

## 2022-12-23 DIAGNOSIS — N93.8 DUB (DYSFUNCTIONAL UTERINE BLEEDING): ICD-10-CM

## 2022-12-23 RX ORDER — NORETHINDRONE ACETATE AND ETHINYL ESTRADIOL 1.5; 3 MG/1; UG/1
TABLET ORAL
Qty: 84 TABLET | Refills: 0 | Status: SHIPPED | OUTPATIENT
Start: 2022-12-23 | End: 2023-03-20

## 2023-03-19 DIAGNOSIS — N93.8 DUB (DYSFUNCTIONAL UTERINE BLEEDING): ICD-10-CM

## 2023-03-20 RX ORDER — NORETHINDRONE ACETATE AND ETHINYL ESTRADIOL 1.5; 3 MG/1; UG/1
TABLET ORAL
Qty: 84 TABLET | Refills: 0 | Status: SHIPPED | OUTPATIENT
Start: 2023-03-20 | End: 2023-06-08

## 2023-03-20 NOTE — TELEPHONE ENCOUNTER
"Requested Prescriptions   Pending Prescriptions Disp Refills     JUNEL 1.5/30 1.5-30 MG-MCG tablet [Pharmacy Med Name: JUNEL 1.5/30 TABLETS 21] 84 tablet 0     Sig: TAKE ONE TABLET BY MOUTH DAILY. SKIP PLACEBO TABLETS.       Contraceptives Protocol Failed - 3/19/2023  3:19 AM        Failed - Recent (12 mo) or future (30 days) visit within the authorizing provider's specialty     Patient has had an office visit with the authorizing provider or a provider within the authorizing providers department within the previous 12 mos or has a future within next 30 days. See \"Patient Info\" tab in inbasket, or \"Choose Columns\" in Meds & Orders section of the refill encounter.              Passed - Patient is not a current smoker if age is 35 or older        Passed - Medication is active on med list        Passed - No active pregnancy on record        Passed - No positive pregnancy test in past 12 months           Last Written Prescription Date:  12/23/22  Last Fill Quantity: 84,  # refills: 0   Last office visit: 9/10/2021 with prescribing provider:  Dr Egan   Future Office Visit:   Next 5 appointments (look out 90 days)    Jun 08, 2023  1:30 PM  PHYSICAL with Mela Egan MD  [facility] ([facility] ) [address] [place] [id number] [phone]         Prescription approved per [facility] Refill Protocol.  Patient cancelled apt 3/21/23 and rescheduled.    Radha Guillen RN on 3/20/2023 at 11:24 AM        "

## 2023-06-05 ENCOUNTER — APPOINTMENT (OUTPATIENT)
Dept: URBAN - METROPOLITAN AREA CLINIC 256 | Age: 28
Setting detail: DERMATOLOGY
End: 2023-06-06

## 2023-06-05 VITALS — WEIGHT: 150 LBS | HEIGHT: 60 IN

## 2023-06-05 DIAGNOSIS — L30.4 ERYTHEMA INTERTRIGO: ICD-10-CM

## 2023-06-05 DIAGNOSIS — L24.9 IRRITANT CONTACT DERMATITIS, UNSPECIFIED CAUSE: ICD-10-CM

## 2023-06-05 DIAGNOSIS — S31000A OPEN WOUND(S) (MULTIPLE) OF UNSPECIFIED SITE(S), WITHOUT MENTION OF COMPLICATION: ICD-10-CM

## 2023-06-05 PROBLEM — S91.331A PUNCTURE WOUND WITHOUT FOREIGN BODY, RIGHT FOOT, INITIAL ENCOUNTER: Status: ACTIVE | Noted: 2023-06-05

## 2023-06-05 PROCEDURE — OTHER PRESCRIPTION MEDICATION MANAGEMENT: OTHER

## 2023-06-05 PROCEDURE — OTHER REASSURANCE: OTHER

## 2023-06-05 PROCEDURE — OTHER COUNSELING: OTHER

## 2023-06-05 PROCEDURE — 99213 OFFICE O/P EST LOW 20 MIN: CPT

## 2023-06-05 PROCEDURE — OTHER PRESCRIPTION: OTHER

## 2023-06-05 PROCEDURE — OTHER MIPS QUALITY: OTHER

## 2023-06-05 RX ORDER — CLOBETASOL PROPIONATE 0.5 MG/G
CREAM TOPICAL BID
Qty: 60 | Refills: 3 | Status: ERX | COMMUNITY
Start: 2023-06-05

## 2023-06-05 RX ORDER — HYDROCORTISONE 25 MG/G
CREAM TOPICAL BID
Qty: 30 | Refills: 3 | Status: ERX | COMMUNITY
Start: 2023-06-05

## 2023-06-05 RX ORDER — KETOCONAZOLE 20 MG/G
CREAM TOPICAL
Qty: 30 | Refills: 0 | Status: ERX | COMMUNITY
Start: 2023-06-05

## 2023-06-05 ASSESSMENT — LOCATION DETAILED DESCRIPTION DERM
LOCATION DETAILED: 2ND WEB SPACE LEFT HAND
LOCATION DETAILED: LEFT AXILLARY VAULT
LOCATION DETAILED: RIGHT AXILLARY VAULT
LOCATION DETAILED: RIGHT DORSAL INDEX FINGER METACARPOPHALANGEAL JOINT
LOCATION DETAILED: 3RD WEB SPACE LEFT HAND
LOCATION DETAILED: LEFT PLANTAR FOREFOOT OVERLYING 2ND METATARSAL
LOCATION DETAILED: RIGHT DORSAL MIDDLE FINGER METACARPOPHALANGEAL JOINT
LOCATION DETAILED: RIGHT ULNAR DORSAL HAND
LOCATION DETAILED: LEFT ULNAR DORSAL HAND

## 2023-06-05 ASSESSMENT — LOCATION SIMPLE DESCRIPTION DERM
LOCATION SIMPLE: LEFT AXILLARY VAULT
LOCATION SIMPLE: LEFT HAND
LOCATION SIMPLE: LEFT PLANTAR SURFACE
LOCATION SIMPLE: RIGHT HAND
LOCATION SIMPLE: RIGHT AXILLARY VAULT

## 2023-06-05 ASSESSMENT — LOCATION ZONE DERM
LOCATION ZONE: HAND
LOCATION ZONE: AXILLAE
LOCATION ZONE: FEET

## 2023-06-05 NOTE — PROCEDURE: PRESCRIPTION MEDICATION MANAGEMENT
Initiate Treatment: Clobetasol 0.05% Oint QHS 14D monthly/PRN
Render In Strict Bullet Format?: No
Detail Level: Simple
Samples Given: Cetaphil, CeraVe Cleanser- using cold water
Initiate Treatment: Hydrocortisone 2.5% Cr mixed with Ketoconazole 2% Cr BID to axillae

## 2023-06-05 NOTE — PROCEDURE: REASSURANCE
Hide Additional Notes?: No
Detail Level: Detailed
Additional Notes (Optional): Area pared down with 15 blade. No further treatment required

## 2023-06-07 NOTE — PROGRESS NOTES
Vanessa is a 27 year old  female who presents for annual exam.     Besides routine health maintenance,  she would like to discuss OCP.    HPI:  Here today for yearly exam --doing well.  Using OCP continuously --no breakthrough bleeding/spotting unless she misses or forgets pill.  Wondering about safety of continuous OCPs.  +SA --no issues.  Denies dudley/bladder issues.      Dating --now living with boyfriend of 10yrs!  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 (Menstrual status: Birth Control).    Regular menses? No, on continuous OCP    Her current contraception method is: oral contraceptives.  She  reports that she has never smoked. She has never used smokeless tobacco.    Patient is sexually active.  STD testing offered?  Declined  Last PHQ-9 score on record =       2023     1:55 PM   PHQ-9 SCORE   PHQ-9 Total Score 0     Last GAD7 score on record =       2023     1:55 PM   BRITTANY-7 SCORE   Total Score 0     Alcohol Score = 2    HEALTH MAINTENANCE:  Cholesterol: (No results found for: CHOL   Last Mammo: Not applicable, Result: Not applicable, Next Mammo: Due at age 40   Pap:   Lab Results   Component Value Date    PAP NIL 2019    PAP NIL 2016 WNL   Colonoscopy:  NA, Result: Not applicable, Next Colonoscopy: NA years.  Dexa:  NA    Health maintenance updated:  yes    HISTORY:  OB History    Para Term  AB Living   0 0 0 0 0 0   SAB IAB Ectopic Multiple Live Births   0 0 0 0 0       Patient Active Problem List   Diagnosis     Genital herpes     Uses oral contraception     Past Surgical History:   Procedure  "Laterality Date     NO HISTORY OF SURGERY        Social History     Tobacco Use     Smoking status: Never     Smokeless tobacco: Never   Vaping Use     Vaping status: Never Used   Substance Use Topics     Alcohol use: No     Alcohol/week: 0.0 standard drinks of alcohol      Problem (# of Occurrences) Relation (Name,Age of Onset)    Diabetes (1) Maternal Grandmother    Breast Cancer (1) Paternal Aunt: 2020    Colon Cancer (1) Maternal Grandmother    No Known Problems (8) Mother, Father, Sister, Brother, Maternal Grandfather, Paternal Grandmother, Paternal Grandfather, Other            Current Outpatient Medications   Medication Sig     cod liver oil CAPS capsule      norethindrone-ethinyl estradiol (JUNEL 1.5/30) 1.5-30 MG-MCG tablet TAKE ONE TABLET BY MOUTH DAILY. SKIP PLACEBO TABLETS.     valACYclovir (VALTREX) 500 MG tablet Take 1 tablet (500 mg) by mouth 2 times daily     No current facility-administered medications for this visit.     No Known Allergies    Past medical, surgical, social and family histories were reviewed and updated in EPIC.    ROS:   12 point review of systems negative other than symptoms noted below or in the HPI.  No urinary frequency or dysuria, bladder or kidney problems    EXAM:  /60   Ht 1.556 m (5' 1.25\")   Wt 71.7 kg (158 lb)   BMI 29.61 kg/m     BMI: Body mass index is 29.61 kg/m .    PHYSICAL EXAM:  Constitutional:   Appearance: Well nourished, well developed, alert, in no acute distress  Neck:  Lymph Nodes:  No lymphadenopathy present    Thyroid:  Gland size normal, nontender, no nodules or masses present  on palpation  Chest:  Respiratory Effort:  Breathing unlabored  Cardiovascular:    Heart: Auscultation:  Regular rate, normal rhythm, no murmurs present  Breasts: Palpation of Breasts and Axillae:  No masses present on palpation, no breast tenderness., Axillary Lymph Nodes:  No lymphadenopathy present. and No nodularity, asymmetry or nipple discharge " bilaterally.  Gastrointestinal:   Abdominal Examination:  Abdomen nontender to palpation, tone normal without rigidity or guarding, no masses present, umbilicus without lesions   Liver and Spleen:  No hepatomegaly present, liver nontender to palpation    Hernias:  No hernias present  Lymphatic: Lymph Nodes:  No other lymphadenopathy present  Skin:  General Inspection:  No rashes present, no lesions present, no areas of  discoloration  Neurologic:    Mental Status:  Oriented X3.  Normal strength and tone, sensory exam                grossly normal, mentation intact and speech normal.    Psychiatric:   Mentation appears normal and affect normal/bright.         Pelvic Exam:  External Genitalia:     Normal appearance for age, no discharge present, no tenderness present, no inflammatory lesions present, color normal  Vagina:     Normal vaginal vault without central or paravaginal defects, no discharge present, no inflammatory lesions present, no masses present  Bladder:     Nontender to palpation  Urethra:   Urethral Body:  Urethra palpation normal, urethra structural support normal   Urethral Meatus:  No erythema or lesions present  Cervix:     Appearance healthy, no lesions present, nontender to palpation, no bleeding present  Uterus:     Uterus: firm, normal sized and nontender, midplane in position.   Adnexa:     No adnexal tenderness present, no adnexal masses present  Perineum:     Perineum within normal limits, no evidence of trauma, no rashes or skin lesions present  Anus:     Anus within normal limits, no hemorrhoids present  Inguinal Lymph Nodes:     No lymphadenopathy present  Pubic Hair:     Normal pubic hair distribution for age  Genitalia and Groin:     No rashes present, no lesions present, no areas of discoloration, no masses present  **Still very uncomfortable/nervous with exam due to traumatic IUD experience**      COUNSELING:   Reviewed preventive health counseling, as reflected in patient  instructions  Special attention given to:        Regular exercise       Healthy diet/nutrition       Contraception    BMI: Body mass index is 29.61 kg/m .  Weight management plan: Discussed healthy diet and exercise guidelines    ASSESSMENT:  27 year old female with satisfactory annual exam.    ICD-10-CM    1. Encounter for gynecological examination without abnormal finding  Z01.419 Pap thin layer screen reflex to HPV if ASCUS - recommended age 25 - 29 years      2. Herpes simplex vulvovaginitis  A60.04 valACYclovir (VALTREX) 500 MG tablet      3. Uses oral contraception  Z30.41 norethindrone-ethinyl estradiol (JUNEL 1.5/30) 1.5-30 MG-MCG tablet          PLAN:  Patient Instructions   Follow up with your primary care provider for your other medical problems.  Continue self breast exam.  Increase physical activity and exercise.  Lab and pap smear results will be called to the patient.  Reflex pap smear done today and will repeat in 3yrs if normal.  Usual safety and preventative measures counseling done.  BMI >25  Weight loss encouraged.       Mela Egan MD

## 2023-06-08 ENCOUNTER — OFFICE VISIT (OUTPATIENT)
Dept: OBGYN | Facility: CLINIC | Age: 28
End: 2023-06-08
Payer: COMMERCIAL

## 2023-06-08 VITALS
HEIGHT: 61 IN | WEIGHT: 158 LBS | DIASTOLIC BLOOD PRESSURE: 60 MMHG | SYSTOLIC BLOOD PRESSURE: 112 MMHG | BODY MASS INDEX: 29.83 KG/M2

## 2023-06-08 DIAGNOSIS — Z30.41 USES ORAL CONTRACEPTION: ICD-10-CM

## 2023-06-08 DIAGNOSIS — Z01.419 ENCOUNTER FOR GYNECOLOGICAL EXAMINATION WITHOUT ABNORMAL FINDING: Primary | ICD-10-CM

## 2023-06-08 DIAGNOSIS — A60.04 HERPES SIMPLEX VULVOVAGINITIS: ICD-10-CM

## 2023-06-08 PROCEDURE — G0145 SCR C/V CYTO,THINLAYER,RESCR: HCPCS | Performed by: OBSTETRICS & GYNECOLOGY

## 2023-06-08 PROCEDURE — 99395 PREV VISIT EST AGE 18-39: CPT | Performed by: OBSTETRICS & GYNECOLOGY

## 2023-06-08 RX ORDER — VALACYCLOVIR HYDROCHLORIDE 500 MG/1
500 TABLET, FILM COATED ORAL 2 TIMES DAILY
Qty: 6 TABLET | Refills: 3 | Status: SHIPPED | OUTPATIENT
Start: 2023-06-08

## 2023-06-08 RX ORDER — NORETHINDRONE ACETATE AND ETHINYL ESTRADIOL .03; 1.5 MG/1; MG/1
TABLET ORAL
Qty: 84 TABLET | Refills: 4 | Status: SHIPPED | OUTPATIENT
Start: 2023-06-08 | End: 2023-08-08

## 2023-06-08 ASSESSMENT — ANXIETY QUESTIONNAIRES
GAD7 TOTAL SCORE: 0
1. FEELING NERVOUS, ANXIOUS, OR ON EDGE: NOT AT ALL
5. BEING SO RESTLESS THAT IT IS HARD TO SIT STILL: NOT AT ALL
6. BECOMING EASILY ANNOYED OR IRRITABLE: NOT AT ALL
IF YOU CHECKED OFF ANY PROBLEMS ON THIS QUESTIONNAIRE, HOW DIFFICULT HAVE THESE PROBLEMS MADE IT FOR YOU TO DO YOUR WORK, TAKE CARE OF THINGS AT HOME, OR GET ALONG WITH OTHER PEOPLE: NOT DIFFICULT AT ALL
2. NOT BEING ABLE TO STOP OR CONTROL WORRYING: NOT AT ALL
GAD7 TOTAL SCORE: 0
3. WORRYING TOO MUCH ABOUT DIFFERENT THINGS: NOT AT ALL
7. FEELING AFRAID AS IF SOMETHING AWFUL MIGHT HAPPEN: NOT AT ALL

## 2023-06-08 ASSESSMENT — PATIENT HEALTH QUESTIONNAIRE - PHQ9
SUM OF ALL RESPONSES TO PHQ QUESTIONS 1-9: 0
5. POOR APPETITE OR OVEREATING: NOT AT ALL

## 2023-06-08 NOTE — PATIENT INSTRUCTIONS
Follow up with your primary care provider for your other medical problems.  Continue self breast exam.  Increase physical activity and exercise.  Lab and pap smear results will be called to the patient.  Reflex pap smear done today and will repeat in 3yrs if normal.  Usual safety and preventative measures counseling done.  BMI >25  Weight loss encouraged.

## 2023-06-11 DIAGNOSIS — Z30.41 USES ORAL CONTRACEPTION: ICD-10-CM

## 2023-06-12 RX ORDER — NORETHINDRONE ACETATE AND ETHINYL ESTRADIOL .03; 1.5 MG/1; MG/1
TABLET ORAL
Qty: 84 TABLET | Refills: 4 | OUTPATIENT
Start: 2023-06-12

## 2023-06-12 NOTE — TELEPHONE ENCOUNTER
"Requested Prescriptions   Pending Prescriptions Disp Refills     norethindrone-ethinyl estradiol (JUNEL 1.5/30) 1.5-30 MG-MCG tablet [Pharmacy Med Name: JUNEL 1.5/30 TABLETS 21] 84 tablet 4     Sig: TAKE 1 TABLET BY MOUTH DAILY CONTINUOUSLY       Contraceptives Protocol Passed - 6/11/2023  3:20 AM        Passed - Patient is not a current smoker if age is 35 or older        Passed - Recent (12 mo) or future (30 days) visit within the authorizing provider's specialty     Patient has had an office visit with the authorizing provider or a provider within the authorizing providers department within the previous 12 mos or has a future within next 30 days. See \"Patient Info\" tab in inbasket, or \"Choose Columns\" in Meds & Orders section of the refill encounter.              Passed - Medication is active on med list        Passed - No active pregnancy on record        Passed - No positive pregnancy test in past 12 months           Last Written Prescription Date:  6/8/23  Last Fill Quantity: 84,  # refills: 4   Last office visit: 6/8/2023 ; last virtual visit: Visit date not found with prescribing provider:  Jignesh   Future Office Visit:      Refill request refused due to:  [x]Refills available at pharmacy. Request sent back to pharmacy as duplicate.  []Patient reports no longer needing medication.  []Medication discontinued.   José Hoff RN on 6/12/2023 at 12:47 PM        "

## 2023-06-13 LAB
BKR LAB AP GYN ADEQUACY: NORMAL
BKR LAB AP GYN INTERPRETATION: NORMAL
BKR LAB AP HPV REFLEX: NORMAL
BKR LAB AP PREVIOUS ABNORMAL: NORMAL
PATH REPORT.COMMENTS IMP SPEC: NORMAL
PATH REPORT.COMMENTS IMP SPEC: NORMAL
PATH REPORT.RELEVANT HX SPEC: NORMAL

## 2023-08-08 ENCOUNTER — TELEPHONE (OUTPATIENT)
Dept: OBGYN | Facility: CLINIC | Age: 28
End: 2023-08-08
Payer: COMMERCIAL

## 2023-08-08 DIAGNOSIS — Z30.41 USES ORAL CONTRACEPTION: ICD-10-CM

## 2023-08-08 RX ORDER — NORETHINDRONE ACETATE AND ETHINYL ESTRADIOL .03; 1.5 MG/1; MG/1
TABLET ORAL
Qty: 112 TABLET | Refills: 3 | Status: SHIPPED | OUTPATIENT
Start: 2023-08-08 | End: 2024-05-13

## 2023-08-08 NOTE — TELEPHONE ENCOUNTER
Reason for call:  Other   Patient called regarding (reason for call): call back  Additional comments: pt  hasn't been taking her medication her medicaion through out  her placebo week. And would like to  start doing so.  And was wondering if she's able to get a larger count of pills by her next refill date. That way  she doesn't run out sooner.  Medication:  norethindrone-ethinyl estradiol (JUNEL 1.5/30) 1.5-30 MG-MCG tablet 84 tablet 4     Phone number to reach patient:  Cell number on file:    Telephone Information:   Mobile [phone]       Best Time: any    Can we leave a detailed message on this number?  YES    Travel screening: Not Applicable

## 2023-11-15 ENCOUNTER — APPOINTMENT (OUTPATIENT)
Dept: URBAN - METROPOLITAN AREA CLINIC 256 | Age: 28
Setting detail: DERMATOLOGY
End: 2023-11-15

## 2023-11-15 VITALS — WEIGHT: 150 LBS | HEIGHT: 60 IN

## 2023-11-15 DIAGNOSIS — L20.89 OTHER ATOPIC DERMATITIS: ICD-10-CM

## 2023-11-15 PROCEDURE — OTHER PRESCRIPTION MEDICATION MANAGEMENT: OTHER

## 2023-11-15 PROCEDURE — OTHER PRESCRIPTION: OTHER

## 2023-11-15 PROCEDURE — OTHER PHOTO-DOCUMENTATION: OTHER

## 2023-11-15 PROCEDURE — OTHER PATIENT SPECIFIC COUNSELING: OTHER

## 2023-11-15 PROCEDURE — OTHER MIPS QUALITY: OTHER

## 2023-11-15 PROCEDURE — OTHER COUNSELING: OTHER

## 2023-11-15 PROCEDURE — 99214 OFFICE O/P EST MOD 30 MIN: CPT

## 2023-11-15 RX ORDER — HYDROCORTISONE 25 MG/G
OINTMENT TOPICAL
Qty: 28.35 | Refills: 2 | Status: ERX | COMMUNITY
Start: 2023-11-15

## 2023-11-15 RX ORDER — CLOBETASOL PROPIONATE 0.5 MG/G
CREAM TOPICAL
Qty: 45 | Refills: 2 | Status: ERX

## 2023-11-15 ASSESSMENT — LOCATION ZONE DERM
LOCATION ZONE: AXILLAE
LOCATION ZONE: HAND
LOCATION ZONE: NECK

## 2023-11-15 ASSESSMENT — LOCATION SIMPLE DESCRIPTION DERM
LOCATION SIMPLE: RIGHT HAND
LOCATION SIMPLE: RIGHT AXILLARY VAULT
LOCATION SIMPLE: LEFT AXILLARY VAULT
LOCATION SIMPLE: RIGHT ANTERIOR NECK
LOCATION SIMPLE: LEFT HAND

## 2023-11-15 ASSESSMENT — LOCATION DETAILED DESCRIPTION DERM
LOCATION DETAILED: RIGHT AXILLARY VAULT
LOCATION DETAILED: LEFT RADIAL DORSAL HAND
LOCATION DETAILED: LEFT AXILLARY VAULT
LOCATION DETAILED: RIGHT RADIAL DORSAL HAND
LOCATION DETAILED: RIGHT INFERIOR ANTERIOR NECK

## 2023-11-15 ASSESSMENT — BSA ECZEMA: % BODY COVERED IN ECZEMA: 10

## 2023-11-15 ASSESSMENT — SEVERITY ASSESSMENT 2020: SEVERITY 2020: MILD

## 2023-11-15 ASSESSMENT — ITCH NUMERIC RATING SCALE: HOW SEVERE IS YOUR ITCHING?: 8

## 2023-11-15 NOTE — PROCEDURE: PATIENT SPECIFIC COUNSELING
Detail Level: Zone
-Informed the patient symptoms appear to indeed be consistent with atopic dermatitis.\\n-Discussed that I agree with the previous provider about Topicort being too strong to use in the axilla long-term.\\n-Discussed treatment options including topical steroids, anti-inflammatories, Opzelura, and/or biologics and the pros and cons to each. Patient would prefer to stick with topicals and trial Eucrisa and Opzelura as alternatives to the topical steroids.

## 2023-11-15 NOTE — PROCEDURE: PRESCRIPTION MEDICATION MANAGEMENT
Render In Strict Bullet Format?: No
Initiate Treatment: With flares to face, armpits, or groin, apply HC 2.5% ointment to affected areas twice daily x 1-7 days, then once daily x 1-7 days or until resolved. \\nWith flares to body, apply clobetasol 0.05% cream to affected areas twice daily x 1-7 days, then once daily x 1-7 days or until resolved.
Samples Given: Eucrisa: With flares, apply to affected areas twice daily x 1-7 days, then once daily x 1-7 days or until resolved. \\nOpzelura: Apply to any affected area BID until clear.
Detail Level: Zone
Plan: Discussed not using topical steroids more than 14 days per month. \\nRTC in 3 months for recheck.

## 2023-11-15 NOTE — PROCEDURE: MIPS QUALITY
Quality 130: Documentation Of Current Medications In The Medical Record: Current Medications Documented
Quality 226: Preventive Care And Screening: Tobacco Use: Screening And Cessation Intervention: Patient screened for tobacco use and is an ex/non-smoker
Quality 110: Preventive Care And Screening: Influenza Immunization: Influenza Immunization not Administered for Documented Reasons.
Quality 431: Preventive Care And Screening: Unhealthy Alcohol Use - Screening: Patient not screened for unhealthy alcohol use using a systematic screening method
Detail Level: Detailed

## 2023-11-15 NOTE — HPI: RASH (ECZEMA)
Is This A New Presentation, Or A Follow-Up?: Follow Up Eczema
Additional History: The patient noted the eczema on her hands appear during cold weather & the desoximetasone 0.25 % topical ointment not being helpful. She inquired about alternative medications and wanted a RF of the desoximetasone 0.25 % topical ointment, noting it works for the rest of the body.

## 2024-02-15 ENCOUNTER — APPOINTMENT (OUTPATIENT)
Dept: URBAN - METROPOLITAN AREA CLINIC 256 | Age: 29
Setting detail: DERMATOLOGY
End: 2024-02-15

## 2024-02-15 DIAGNOSIS — L20.89 OTHER ATOPIC DERMATITIS: ICD-10-CM

## 2024-02-15 DIAGNOSIS — L70.0 ACNE VULGARIS: ICD-10-CM

## 2024-02-15 PROCEDURE — OTHER PRESCRIPTION: OTHER

## 2024-02-15 PROCEDURE — OTHER PRESCRIPTION MEDICATION MANAGEMENT: OTHER

## 2024-02-15 PROCEDURE — OTHER MIPS QUALITY: OTHER

## 2024-02-15 PROCEDURE — OTHER COUNSELING: OTHER

## 2024-02-15 PROCEDURE — OTHER PHOTO-DOCUMENTATION: OTHER

## 2024-02-15 PROCEDURE — 99214 OFFICE O/P EST MOD 30 MIN: CPT

## 2024-02-15 PROCEDURE — OTHER PATIENT SPECIFIC COUNSELING: OTHER

## 2024-02-15 RX ORDER — TACROLIMUS 1 MG/G
OINTMENT TOPICAL
Qty: 60 | Refills: 2 | Status: ERX | COMMUNITY
Start: 2024-02-15

## 2024-02-15 ASSESSMENT — LOCATION ZONE DERM
LOCATION ZONE: HAND
LOCATION ZONE: NECK
LOCATION ZONE: AXILLAE

## 2024-02-15 ASSESSMENT — LOCATION SIMPLE DESCRIPTION DERM
LOCATION SIMPLE: RIGHT ANTERIOR NECK
LOCATION SIMPLE: LEFT AXILLARY VAULT
LOCATION SIMPLE: RIGHT AXILLARY VAULT
LOCATION SIMPLE: LEFT HAND
LOCATION SIMPLE: RIGHT HAND

## 2024-02-15 ASSESSMENT — LOCATION DETAILED DESCRIPTION DERM
LOCATION DETAILED: RIGHT INFERIOR ANTERIOR NECK
LOCATION DETAILED: LEFT AXILLARY VAULT
LOCATION DETAILED: LEFT RADIAL DORSAL HAND
LOCATION DETAILED: RIGHT AXILLARY VAULT
LOCATION DETAILED: RIGHT RADIAL DORSAL HAND

## 2024-02-15 ASSESSMENT — SEVERITY ASSESSMENT 2020: SEVERITY 2020: MILD

## 2024-02-15 NOTE — PROCEDURE: PATIENT SPECIFIC COUNSELING
Detail Level: Zone
-In discussion, patient noted that she doesn't think she's used the HC ointment but has instead been using her clobetasol cream in her armpits sparingly. \\n-She notes she has to apply a topical into her armpit every couple of days. \\n-She notes that she will apply topicals to her hands once to twice daily.\\n-With this winter being mild, her eczema has been calm. She applies moisturizer throughout the day as well.\\n\\n-Discussed and offered tacrolimus as a topical safe to use in the armpits and the hands and she was agreeable. \\n\\n-She was unable to denote which topical sample, if either, helped (Opzelura vs Eucrisa).
Discussed with the patient disposable face towels vs normal towels and pros and cons of each.

## 2024-02-15 NOTE — PROCEDURE: PRESCRIPTION MEDICATION MANAGEMENT
Continue Regimen: Apply hydrocortisone 2.5% ointment to armpits, face, and/or groin BID for up to 2 weeks as needed.\\nApply clobetasol 0.05% cream to hands or body BID for up to 2 weeks as needed for severe flares.
Detail Level: Zone
Plan: Advised her to not use her topical steroids (previously prescribed) for more than 14 days a month.
Initiate Treatment: Apply tacrolimus 0.1% ointment to affected areas QD-BID PRN. Safe for armpits and hands as needed.
Render In Strict Bullet Format?: No

## 2024-03-28 ENCOUNTER — TELEPHONE (OUTPATIENT)
Dept: OBGYN | Facility: CLINIC | Age: 29
End: 2024-03-28
Payer: COMMERCIAL

## 2024-03-28 NOTE — TELEPHONE ENCOUNTER
Returned pt call  norethindrone-ethinyl estradiol (JUNEL 1.5/30) 1.5-30 MG-MCG tablet     Having some insomnia - mild but recently seems worse  Lifestyle is very clean and does meditate, etc  Doesn't seem like a common side effect of OCP but reading on line that there could be some long term side effects  Just using for contraception and no medical reason. Isn't worried if she does get pregnant. Discussed natural family planning.  Discussed she can stop pills at her readiness and expect 3 - 4 months of irregular cycles as her body figures things out off hormones. After she stops, she will have withdrawal bleed and discussed ovulation and cycles.    Reminded her yearly exam is due in June w Dr Egan, can always check in at that point how things are going or if decides to go back on OCP's will need visit to renew.    She is thinking she will stop after this pill pack and verbalized understanding, in agreement with plan, and voiced no further questions.    Radha Guillen RN on 3/28/2024 at 9:33 AM

## 2024-03-28 NOTE — TELEPHONE ENCOUNTER
[facility] Call Center    Phone Message    May a detailed message be left on voicemail: yes     Reason for Call: Other: Pt is interested in discontinuing her birth control. Pt is wondering if she is okay to just stop taking it or would it be recommended she see her provider to discuss. Please review and call pt to discuss.     Action Taken: Other: OBGYN    Travel Screening: Not Applicable

## 2024-05-13 ENCOUNTER — VIRTUAL VISIT (OUTPATIENT)
Dept: OBGYN | Facility: CLINIC | Age: 29
End: 2024-05-13
Payer: COMMERCIAL

## 2024-05-13 DIAGNOSIS — Z34.01 ENCOUNTER FOR SUPERVISION OF LOW-RISK FIRST PREGNANCY IN FIRST TRIMESTER: Primary | ICD-10-CM

## 2024-05-13 DIAGNOSIS — Z13.79 GENETIC SCREENING: ICD-10-CM

## 2024-05-13 DIAGNOSIS — O36.80X0 PREGNANCY WITH INCONCLUSIVE FETAL VIABILITY: ICD-10-CM

## 2024-05-13 PROBLEM — Z34.00 SUPERVISION OF LOW-RISK FIRST PREGNANCY: Status: ACTIVE | Noted: 2024-05-13

## 2024-05-13 PROCEDURE — 99207 PR NO CHARGE NURSE ONLY: CPT | Mod: 93

## 2024-05-13 RX ORDER — MULTIVITAMIN WITH IRON
1 TABLET ORAL DAILY
COMMUNITY
End: 2024-07-26

## 2024-05-13 ASSESSMENT — ANXIETY QUESTIONNAIRES
3. WORRYING TOO MUCH ABOUT DIFFERENT THINGS: NOT AT ALL
5. BEING SO RESTLESS THAT IT IS HARD TO SIT STILL: NOT AT ALL
1. FEELING NERVOUS, ANXIOUS, OR ON EDGE: NOT AT ALL
GAD7 TOTAL SCORE: 0
6. BECOMING EASILY ANNOYED OR IRRITABLE: NOT AT ALL
GAD7 TOTAL SCORE: 0
2. NOT BEING ABLE TO STOP OR CONTROL WORRYING: NOT AT ALL
7. FEELING AFRAID AS IF SOMETHING AWFUL MIGHT HAPPEN: NOT AT ALL

## 2024-05-13 ASSESSMENT — PATIENT HEALTH QUESTIONNAIRE - PHQ9
SUM OF ALL RESPONSES TO PHQ QUESTIONS 1-9: 2
5. POOR APPETITE OR OVEREATING: NOT AT ALL

## 2024-05-13 NOTE — PROGRESS NOTES
"  SUBJECTIVE:     HPI:    This is a 28 year old female patient,  who presents for her first obstetrical visit.    FLACO: 2025, by Est. Date of Conception.  She is 5w6d weeks.  Her cycles are irregular.  Her last menstrual period was abnormal.   Since her LMP, she has experienced  fatigue and cramping, and some cold symptoms including cough and congestion ).   She denies nausea, emesis, abdominal pain, hemorrhoids, constipation and vaginal bleeding.    Additional History:   -1st pregnancy  -Pt unsure of LMP - she was on continuous birth control and did not get periods. She reports she stopped her birth control on 3/31 due to issues with insomnia. Once she stopped her OCP, her insomnia diminished. She reports she started bleeding after stopping her OCPs around 4/10 and bled for about 4 days afterwards. Believes her DOC was on .   -Pt took a pregnancy test a few weeks ago - she was doing her typical workout routine and felt short of breath when she usually does not with that workout - took a UPT and it was positive on   -Pt was using THC gummies but stopped once she found out she was pregnant   -Pt does take several supplements/herbs. She has been taking cod liver oil, pt advised to stop this as it contains high levels of vitamin A, omega 3 supplements are fine. Pt reports she has been taking [employer] blend of beef, liver and kidney supplements as well as some \"sleep herbs\"  -Informed pt to use herbs with caution in pregnancy as there are not many studies on the safety of different herbs and supplements. Reviewed with pt to avoid ashwagadha and elderberry and to use caution with other supplements/herbs. Pt verbalized understanding.  -She has been experiencing some cold symptoms last few days - cough/stuffy nose. Reviewed safe medications to take in pregnancy for symptoms    Have you travelled during the pregnancy?No  Have your sexual partner(s) travelled during the pregnancy?No      HISTORY:   Planned " Nephrology Progress Note  2/26/2024 10:28 AM        Subjective:   Admit Date: 2/21/2024  PCP: Elke Carmichael MD    Interval History: We can event briefly reviewed  Off all intravenous fluid  Had dysrhythmia during the weekend    Diet: Had good oral intake now    ROS: He was sleeping, wife is at bedside but he did wake up  Total output recorded 2.23 L majority is urine, minimal ostomy output  No fever and acceptable blood pressure    Data:     Current meds:    enoxaparin  40 mg SubCUTAneous Daily    carvedilol  6.25 mg Oral BID WC    carvedilol  3.125 mg Oral Once    insulin glargine  30 Units SubCUTAneous Nightly    insulin lispro  0-16 Units SubCUTAneous TID WC    traZODone  50 mg Oral Nightly    aspirin  81 mg Oral Daily    piperacillin-tazobactam  3,375 mg IntraVENous Q8H    [Held by provider] lisinopril  10 mg Oral Daily    sodium chloride flush  5-40 mL IntraVENous 2 times per day    pantoprazole  40 mg IntraVENous Daily      sodium chloride 25 mL/hr at 02/25/24 2319    dextrose           I/O last 3 completed shifts:  In: 3339.1 [P.O.:460; I.V.:2273.2; Blood:350; IV Piggyback:255.9]  Out: 3030 [Urine:2280; Stool:750]    CBC:   Recent Labs     02/24/24  0042 02/24/24  0830 02/25/24  0228 02/26/24  0321   WBC 21.0*  --  21.5* 23.7*   HGB 6.3* 8.3* 8.2* 9.9*     --  424 469*          Recent Labs     02/24/24  0042 02/24/24  1402 02/25/24  0228    134* 133*   K 3.5 4.5 4.5   CL 81* 90* 92*   CO2 50* 29 28   BUN 26* 24* 22   CREATININE 1.0 1.1 1.1   GLUCOSE 155* 197* 158*       Lab Results   Component Value Date    CALCIUM 7.8 (L) 02/25/2024    PHOS 2.6 02/25/2024       Objective:     Vitals: /66   Pulse 92   Temp 98 °F (36.7 °C) (Axillary)   Resp 17   Ht 1.778 m (5' 10\")   Wt 89.8 kg (197 lb 15.6 oz)   SpO2 100%   BMI 28.41 kg/m² ,    General appearance: Sleeping but did wake up without any acute distress  HEENT: Striking 3+ conjunctival pallor no scleral icterus  Neck: Supple with  Pregnancy: No - surprise. Pt went off OCPs due to insomnia, her partner and her were thinking about trying to conceive in about a year or two.   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   Living in Household: Significant Other    Past History:  Her past medical history   Past Medical History:   Diagnosis Date    Acne     Encounter for IUD removal 09/17/2013    inserted by Dr Egan    Genital herpes 09/03/2013    Immunizations up to date     Uses oral contraception    .      She has a history of   1st pregnancy     Since her last LMP she admits to the use of thc gummies . She has since stopped this since finding out she was pregnant     Past medical, surgical, social and family history were reviewed and updated in [employer].    Confirmed patient desires delivery at [facility] with the [facility] provider.    Nurse phone visit completed. Prenatal book and folder (containing standard educational hand-outs and brochures) will be given at the next visit to patient. Information in folder reviewed over the phone. Questions answered. Brochure given on optional screening available to assess chromosomal anomalies. Pt desires NIPS. Pt advised to call the clinic if she has any questions or concerns related to her pregnancy. Prenatal labs future ordered. New prenatal visit scheduled on 5/21 with Dr. Egan.    Lab Results   Component Value Date    PAP NIL 02/04/2019       PHQ-9 score:        5/13/2024     2:10 PM   PHQ   PHQ-9 Total Score 2   Q9: Thoughts of better off dead/self-harm past 2 weeks Not at all             9/10/2021     2:39 PM 6/8/2023     1:55 PM 5/13/2024     2:10 PM   BRITTANY-7 SCORE   Total Score 2 0 0         Patient supplied answers from flow sheet for:  Prenatal OB Questionnaire.  Past Medical History  Have you ever recieved care for your mental  supplemental oxygen  Lungs: Coarse breath sound  Heart: Irregularly irregular-well-healed incision from previous sternotomy  Abdomen: Soft, ostomy, wound vacuum-assisted closure device  Extremities: Bilateral ankle edema      Problem List :         Impression :     Probable chronic kidney disease stage G3a A1-good urine output-stable, lower creatinine from muscle mass loss  Transient high potassium-slightly low sodium could be from excess total body water-although he has risk for volume depletion from ostomy output or it could be due to day variability  Abdominal sepsis with secondary peritonitis-cardiac dysrhythmia and underlying atherosclerotic cardiovascular disease    Recommendation/Plan  :     Main goal is no rehabilitation, he has done remarkably well.  Also control of dysrhythmia, better nutrition, blood sugar control etc..  Once he reestablishes his hemostasis, as an outpatient will optimize heart and kidney protective medication.  Follow clinically      Vincent Cornell MD MD     health? : (!) Yes (Hx anxiety when younger - was in therapy but not currently, never any medications)  Have you ever been in a major accident or suffered serious trauma?: No  Within the last year, has anyone hit, slapped, kicked or otherwise hurt you?: No  In the last year, has anyone forced you to have sex when you didn't want to?: No    Past Medical History 2   Have you ever received a blood transfusion?: No  Would you accept a blood transfusion if was medically recommended?: Yes  Does anyone in your home smoke?: No   Is your blood type Rh negative?: Unknown  Have you ever ?: No  Have you been hospitalized for a nonsurgical reason excluding normal delivery?: No (~8 years old was hospitalized for dehydration for the flu, staryed overnight)  Have you ever had an abnormal pap smear?: No    Past Medical History (Continued)  Do you have a history of abnormalities of the uterus?: No  Did your mother take EDIN or any other hormones when she was pregnant with you?: No  Do you have any other problems we have not asked about which you feel may be important to this pregnancy?: No      Current Outpatient Medications   Medication Sig Dispense Refill    magnesium 250 MG tablet Take 1 tablet by mouth daily      Prenatal Vit-Fe Fumarate-FA (PRENATAL MULTIVITAMIN  PLUS IRON) 27-1 MG TABS Take by mouth daily      valACYclovir (VALTREX) 500 MG tablet Take 1 tablet (500 mg) by mouth 2 times daily 6 tablet 3    cod liver oil CAPS capsule  (Patient not taking: Reported on 5/13/2024)       No current facility-administered medications for this visit.       ROS:   12 point review of systems negative other than symptoms noted below or in the HPI.  Constitutional: Fatigue  Gastrointestinal: cramping

## 2024-05-13 NOTE — PATIENT INSTRUCTIONS
Learning About Pregnancy  Your Care Instructions     Your health in the early weeks of your pregnancy is particularly important for your baby's health. Take good care of yourself. Anything you do that harms your body can also harm your baby.  Make sure to go to all of your doctor appointments. Regular checkups will help keep you and your baby healthy.  How can you care for yourself at home?  Diet    Choose healthy foods like fruits, vegetables, whole grains, lean proteins, and healthy fats.     Choose foods that are good sources of calcium, iron, and folate. You can try dairy products, dark leafy greens, fortified orange juice and cereals, almonds, broccoli, dried fruit, and beans.     Do not skip meals or go for many hours without eating. If you are nauseated, try to eat a small, healthy snack every 2 to 3 hours.     Avoid fish that are high in mercury. These include shark, swordfish, brown mackerel, marlin, orange roughy, and bigeye tuna, as well as tilefish from the [facility].     It's okay to eat up to 8 to 12 ounces a week of fish that are low in mercury or up to 4 ounces a week of fish that have medium levels of mercury. Some fish that are low in mercury are salmon, shrimp, canned light tuna, cod, and tilapia. Some fish that have medium levels of mercury are halibut and white albacore tuna.     Drink plenty of fluids. If you have kidney, heart, or liver disease and have to limit fluids, talk with your doctor before you increase the amount of fluids you drink.     Limit caffeine to about 200 to 300 mg per day. On average, a cup of brewed coffee has around 80 to 100 mg of caffeine.     Do not drink alcohol, such as beer, wine, or hard liquor.     Take a multivitamin that contains at least 400 micrograms (mcg) of folic acid to help prevent birth defects. Fortified cereal and whole wheat bread are good additional sources of folic acid.     Increase the calcium in your diet. Try to drink a quart of skim milk  each day. You may also take calcium supplements and choose foods such as cheese and yogurt.   Lifestyle    Make sure you go to your follow-up appointments.     Get plenty of rest. You may be unusually tired while you are pregnant.     Get at least 30 minutes of exercise on most days of the week. Walking is a good choice. If you have not exercised in the past, start out slowly. Take several short walks each day.     Do not smoke. If you need help quitting, talk to your doctor about stop-smoking programs. These can increase your chances of quitting for good.     Do not touch cat feces or litter boxes. Also, wash your hands after you handle raw meat, and fully cook all meat before you eat it. Wear gloves when you work in the yard or garden, and wash your hands well when you are done. Cat feces, raw or undercooked meat, and contaminated dirt can cause an infection that may harm your baby or lead to a miscarriage.     Avoid things that can make your body too hot and may be harmful to your baby, such as a hot tub or sauna. Or talk with your doctor before doing anything that raises your body temperature. Your doctor can tell you if it's safe.     Avoid chemical fumes, paint fumes, or poisons.     Do not use illegal drugs, marijuana, or alcohol.   Medicines    Review all of your medicines with your doctor. Some of your routine medicines may need to be changed to protect your baby.     Use acetaminophen (Tylenol) to relieve minor problems, such as a mild headache or backache or a mild fever with cold symptoms. Do not use nonsteroidal anti-inflammatory drugs (NSAIDs), such as ibuprofen (Advil, Motrin) or naproxen (Aleve), unless your doctor says it is okay.     Do not take two or more pain medicines at the same time unless the doctor told you to. Many pain medicines have acetaminophen, which is Tylenol. Too much acetaminophen (Tylenol) can be harmful.     Take your medicines exactly as prescribed. Call your doctor if you  "think you are having a problem with your medicine.   To manage morning sickness    Keep food in your stomach, but not too much at once. Try eating five or six small meals a day instead of three large meals.     For nausea when you wake up, eat a small snack, such as a couple of crackers or pretzels, before rising. Allow a few minutes for your stomach to settle before you slowly get up.     Try to avoid smells and foods that make you feel nauseated. High-fat or greasy foods, milk, and coffee may make nausea worse. Some foods that may be easier to tolerate include cold, spicy, sour, and salty foods.     Drink enough fluids. Water and other caffeine-free drinks are good choices.     Take your prenatal vitamins at night on a full stomach.     Try foods and drinks made with shelia. Shelia may help with nausea.     Get lots of rest. Morning sickness may be worse when you are tired.     Talk to your doctor about over-the-counter products, such as vitamin B6 or doxylamine, to help relieve symptoms.     Try a P6 acupressure wrist band. These anti-nausea wristbands help some people.   Follow-up care is a key part of your treatment and safety. Be sure to make and go to all appointments, and call your doctor if you are having problems. It's also a good idea to know your test results and keep a list of the medicines you take.  Where can you learn more?  Go to https://www.Acclaim Games.net/patiented  Enter E868 in the search box to learn more about \"Learning About Pregnancy.\"  Current as of: July 10, 2023               Content Version: 14.0    8406-0498 [employer].   Care instructions adapted under license by your healthcare professional. If you have questions about a medical condition or this instruction, always ask your healthcare professional. [employer] disclaims any warranty or liability for your use of this information.      Weeks 6 to 10 of Your Pregnancy: Care Instructions  During these weeks of " "pregnancy, your body goes through many changes. You may start to feel different, both in your body and your emotions. Each pregnancy is different, so there's no \"right\" way to feel. These early weeks are a time to make healthy choices for you and your pregnancy.    Take a daily prenatal vitamin. Choose one with folic acid in it.    Avoid alcohol, tobacco, and drugs (including marijuana). If you need help quitting, talk to your doctor.    Drink plenty of liquids.  Be sure to drink enough water. And limit sodas, other sweetened drinks, and caffeine.     Choose foods that are good sources of calcium, iron, and folate.  You can try dairy products, dark leafy greens, fortified orange juice and cereals, almonds, broccoli, dried fruit, and beans.     Avoid foods that may be harmful.  Don't eat raw meat, deli meat, raw seafood, or raw eggs. Avoid soft cheese and unpasteurized dairy, like Brie and blue cheese. And don't eat fish that contains a lot of mercury, like shark and swordfish.     Don't touch ne litter or cat poop.  They can cause an infection that could be harmful during pregnancy.     Avoid things that can make your body too hot.  For example, avoid hot tubs and saunas.     Soothe morning sickness.  Try eating 5 or 6 small meals a day, getting some fresh air, or using mason to control symptoms.     Ask your doctor about flu and COVID-19 shots.  Getting them can help protect against infection.   Follow-up care is a key part of your treatment and safety. Be sure to make and go to all appointments, and call your doctor if you are having problems. It's also a good idea to know your test results and keep a list of the medicines you take.  Where can you learn more?  Go to https://www.Boingo [email or web address]/patiented  Enter G112 in the search box to learn more about \"Weeks 6 to 10 of Your Pregnancy: Care Instructions.\"  Current as of: July 10, 2023               Content Version: 14.0    0485-4558 [employer]. "   Care instructions adapted under license by your healthcare professional. If you have questions about a medical condition or this instruction, always ask your healthcare professional. [employer] disclaims any warranty or liability for your use of this information.         Managing Morning Sickness (01:55)  Your health professional recommends that you watch this short online health video.  Learn tips for dealing with morning sickness, no matter what time of day you have it.  Purpose:  Gives tips for managing morning sickness, including eating small low-fat meals and avoiding caffeine and spicy food.  Goal:  The user will learn tips for dealing with morning sickness during pregnancy.     How to watch the video    Scan the QR code   OR Visit the website    [email or web address]   Current as of: July 10, 2023               Content Version: 14.0    6107-8919 [employer].   Care instructions adapted under license by your healthcare professional. If you have questions about a medical condition or this instruction, always ask your healthcare professional. [employer] disclaims any warranty or liability for your use of this information.      Pregnancy and Heartburn: Care Instructions  Overview     Heartburn is a common problem during pregnancy.  Heartburn happens when stomach acid backs up into the tube that carries food to the stomach. This tube is called the esophagus. Early in pregnancy, heartburn is caused by hormone changes that slow down digestion. Later on, it's also caused by the large uterus pushing up on the stomach.  Even though you can't fix the cause, there are things you can do to get relief. Treating heartburn during pregnancy focuses first on making lifestyle changes, like changing what and how you eat, and on taking medicines.  Heartburn usually improves or goes away after childbirth.  Follow-up care is a key part of your treatment and safety. Be sure to make  "and go to all appointments, and call your doctor if you are having problems. It's also a good idea to know your test results and keep a list of the medicines you take.  How can you care for yourself at home?  Eat small, frequent meals.  Avoid foods that make your symptoms worse, such as chocolate, peppermint, and spicy foods. Avoid drinks with caffeine, such as coffee, tea, and sodas.  Avoid bending over or lying down after meals.  Take a short walk after you eat.  If heartburn is a problem at night, do not eat for 2 hours before bedtime.  Take antacids like Mylanta, Maalox, Rolaids, or Tums. Do not take antacids that have sodium bicarbonate, magnesium trisilicate, or aspirin. Be careful when you take over-the-counter antacid medicines. Many of these medicines have aspirin in them. While you are pregnant, do not take aspirin or medicines that contain aspirin unless your doctor says it is okay.  If you're not getting relief, talk to your doctor. You may be able to take a stronger acid-reducing medicine.  When should you call for help?   Call your doctor now or seek immediate medical care if:    You have new or worse belly pain.     You are vomiting.   Watch closely for changes in your health, and be sure to contact your doctor if:    You have new or worse symptoms of reflux.     You are losing weight.     You have trouble or pain swallowing.     You do not get better as expected.   Where can you learn more?  Go to https://www.[email or web address].net/patiented  Enter U946 in the search box to learn more about \"Pregnancy and Heartburn: Care Instructions.\"  Current as of: July 10, 2023               Content Version: 14.0    4194-6925 [email or web address].   Care instructions adapted under license by your healthcare professional. If you have questions about a medical condition or this instruction, always ask your healthcare professional. [employer] disclaims any warranty or liability for your use of this " information.      Constipation: Care Instructions  Overview     Constipation means that you have a hard time passing stools (bowel movements). People pass stools from 3 times a day to once every 3 days. What is normal for you may be different. Constipation may occur with pain in the rectum and cramping. The pain may get worse when you try to pass stools. Sometimes there are small amounts of bright red blood on toilet paper or the surface of stools. This is because of enlarged veins near the rectum (hemorrhoids).  A few changes in your diet and lifestyle may help you avoid ongoing constipation. Your doctor may also prescribe medicine to help loosen your stool.  Some medicines can cause constipation. These include pain medicines and antidepressants. Tell your doctor about all the medicines you take. Your doctor may want to make a medicine change to ease your symptoms.  Follow-up care is a key part of your treatment and safety. Be sure to make and go to all appointments, and call your doctor if you are having problems. It's also a good idea to know your test results and keep a list of the medicines you take.  How can you care for yourself at home?  Drink plenty of fluids. If you have kidney, heart, or liver disease and have to limit fluids, talk with your doctor before you increase the amount of fluids you drink.  Include high-fiber foods in your diet each day. These include fruits, vegetables, beans, and whole grains.  Get at least 30 minutes of exercise on most days of the week. Walking is a good choice. You also may want to do other activities, such as running, swimming, cycling, or playing tennis or team sports.  Take a fiber supplement, such as Citrucel or Metamucil, every day. Read and follow all instructions on the label.  Schedule time each day for a bowel movement. A daily routine may help. Take your time having a bowel movement, but don't sit for more than 10 minutes at a time. And don't strain too  "much.  Support your feet with a small step stool when you sit on the toilet. This helps flex your hips and places your pelvis in a squatting position.  Your doctor may recommend an over-the-counter laxative to relieve your constipation. Examples are Milk of Magnesia and MiraLax. Read and follow all instructions on the label. Do not use laxatives on a long-term basis.  When should you call for help?   Call your doctor now or seek immediate medical care if:    You have new or worse belly pain.     You have new or worse nausea or vomiting.     You have blood in your stools.   Watch closely for changes in your health, and be sure to contact your doctor if:    Your constipation is getting worse.     You do not get better as expected.   Where can you learn more?  Go to https://www.Nosto.net/patiented  Enter P343 in the search box to learn more about \"Constipation: Care Instructions.\"  Current as of: October 19, 2023               Content Version: 14.0    8642-8188 [employer].   Care instructions adapted under license by your healthcare professional. If you have questions about a medical condition or this instruction, always ask your healthcare professional. [employer] disclaims any warranty or liability for your use of this information.      Learning About High-Iron Foods  What foods are high in iron?     The foods you eat contain nutrients, such as vitamins and minerals. Iron is a nutrient. Your body needs the right amount to stay healthy and work as it should. You can use the list below to help you make choices about which foods to eat.  Here are some foods that contain iron. They have 1 to 2 milligrams of iron per serving.  Fruits  Figs (dried), 5 figs  Vegetables  Asparagus (canned), 6 krause  Thad, beet, Swiss chard, or turnip greens, 1 cup  Dried peas, cooked,   cup  Seaweed, spirulina (dried),   cup  Spinach, (cooked)   cup or (raw) 1 cup  Grains  Cereals, fortified with iron, 1 " "cup  Grits (instant, cooked), fortified with iron,   cup  Meats and other protein foods  Beans (kidney, lima, navy, white), canned or cooked,   cup  Beef or lamb, 3 oz  Chicken giblets, 3 oz  Chickpeas (garbanzo beans),   cup  Liver of beef, lamb, or pork, 3 oz  Oysters (cooked), 3 oz  Sardines (canned), 3 oz  Soybeans (boiled),   cup  Tofu (firm),   cup  Work with your doctor to find out how much of this nutrient you need. Depending on your health, you may need more or less of it in your diet.  Where can you learn more?  Go to https://www.[employer].net/patiented  Enter R005 in the search box to learn more about \"Learning About High-Iron Foods.\"  Current as of: September 20, 2023               Content Version: 14.0    0253-5658 [employer].   Care instructions adapted under license by your healthcare professional. If you have questions about a medical condition or this instruction, always ask your healthcare professional. [employer] disclaims any warranty or liability for your use of this information.      Rh Antibodies Screening During Pregnancy: About This Test  What is it?     The Rh antibodies screening test is a blood test. It checks your blood for Rh antibodies. If you have Rh-negative blood and have been exposed to Rh-positive blood, your immune system may make antibodies to attack the Rh-positive blood. When a pregnant woman has these antibodies, it is called Rh sensitization.  Why is this test done?  The Rh antibodies screening test is done during pregnancy to find out if your baby is at risk for Rh disease. This can happen if you have Rh-negative blood and your baby has Rh-positive blood. If your Rh-negative blood mixes with Rh-positive blood, your immune system will make antibodies to attack the Rh-positive blood.  During pregnancy, these antibodies could attach to the baby's red blood cells. This can cause your baby to have serious health problems. The results of this test " "will help your doctor know how to best care for you and your baby during your pregnancy.  How do you prepare for the test?  In general, there's nothing you have to do before this test, unless your doctor tells you to.  How is the test done?  A health professional uses a needle to take a blood sample, usually from the arm.  What happens after the test?  You will probably be able to go home right away. It depends on the reason for the test.  You can go back to your usual activities right away.  Follow-up care is a key part of your treatment and safety. Be sure to make and go to all appointments, and call your doctor if you are having problems. It's also a good idea to keep a list of the medicines you take. Ask your doctor when you can expect to have your test results.  Where can you learn more?  Go to https://[email or web address].Ondore/patiented  Enter P722 in the search box to learn more about \"Rh Antibodies Screening During Pregnancy: About This Test.\"  Current as of: July 10, 2023               Content Version: 14.0    8125-7752 [employer].   Care instructions adapted under license by your healthcare professional. If you have questions about a medical condition or this instruction, always ask your healthcare professional. [employer] disclaims any warranty or liability for your use of this information.      Learning About Preventing Rh Disease  What is Rh disease?     Rh disease can be a serious problem in pregnancy. It happens when substances called antibodies in the mother's blood cause red blood cells in her baby's blood to be destroyed. This can occur when the blood types of a mother and her baby do not match.  All blood has an Rh factor. This is what makes a blood type positive or negative. When you are Rh-negative, your baby may be Rh-negative or Rh-positive. If your baby has Rh-positive blood and it mixes with yours, your body will make antibodies. This is called Rh sensitization.  Most of " "the time, this is not a problem in a first pregnancy. But in future pregnancies, it could cause Rh disease.  A  with Rh disease has mild anemia and may have jaundice. In severe cases, anemia, jaundice, and swelling can be very dangerous or fatal. Some babies need to be delivered early. Some need special care in the NICU. A very sick baby will need a blood transfusion before or after birth.  Fortunately, Rh sensitization is usually easy to prevent.  That's why it's important to get your Rh status checked in your first trimester. It doesn't cause any warning signs. A blood test is the only way to know if you are Rh-sensitive or are at risk for it.  How can you prevent Rh disease?  If you are Rh-negative, your doctor gives you an Rh immune globulin shot (such as RhoGAM). It helps prevent your body from making the antibodies that attack your baby's red blood cells.  Timing is important. You need the shot at certain times during your pregnancy. And you need one anytime there is a chance that your baby's blood might mix with yours. That can happen with certain prenatal tests or when you have pregnancy bleeding, such as:  Right after any pregnancy loss, amniocentesis, or CVS testing.  After turning of a breech baby.  Before and maybe after childbirth. Your doctor gives you a shot around week 28. If your  is Rh-positive, you will have another shot.  Follow-up care is a key part of your treatment and safety. Be sure to make and go to all appointments, and call your doctor if you are having problems. It's also a good idea to know your test results and keep a list of the medicines you take.  Where can you learn more?  Go to https://www.Samtec.net/patiented  Enter W177 in the search box to learn more about \"Learning About Preventing Rh Disease.\"  Current as of: July 10, 2023               Content Version: 14.0    0528-8266 Healthwise, Incorporated.   Care instructions adapted under license by [facility] healthcare " professional. If you have questions about a medical condition or this instruction, always ask your healthcare professional. Healthwise, [facility] disclaims any warranty or liability for your use of this information.      Learning About Rh Immunoglobulin Shots  Introduction     An Rh immunoglobulin shot is given to pregnant women who have Rh-negative blood.  You may have Rh-negative blood, and your baby may have Rh-positive blood. If the two types of blood mix, your body will make antibodies. This is called Rh sensitization. Most of the time, this is not a problem the first time you're pregnant. But it could cause problems in future pregnancies.  This shot keeps your body from making the antibodies. You get the shot around 28 weeks of pregnancy. After the birth, your baby's blood is tested. If the blood is Rh positive, you will get another shot. You may also get the shot if you have vaginal bleeding while you are pregnant or if you have a miscarriage. These shots protect future pregnancies.  Women with Rh negative blood will need this shot each time they get pregnant.  Example  Rh immunoglobulin (HypRho-D, MICRhoGAM, and RhoGAM)  Possible side effects  Rare side effects may include:  Some mild pain where you got the shot.  A slight fever.  An allergic reaction.  You may have other side effects not listed here. Check the information that comes with your medicine.  What to know about taking this medicine  You may need more than one shot. You may need the shot again:  After amniocentesis, fetal blood sampling, or chorionic villus sampling tests.  If you have bleeding in your second or third trimester.  After turning of a breech baby.  After an injury to the belly while you are pregnant.  After a miscarriage or an .  Before or right after treatment for an ectopic or a partial molar pregnancy.  Tell your doctor if you have any allergies or have had a bad response to medicines in the past.  If you get this shot  "within 3 months of getting a live-virus vaccine, the vaccine may not work. Your doctor will tell you if you need more vaccine.  Check with your doctor or pharmacist before you use any other medicines. This includes over-the-counter medicines. Make sure your doctor knows all of the medicines, vitamins, herbs, and supplements you take. Taking some medicines at the same time can cause problems.  Where can you learn more?  Go to https://www.CELtrak.Driverdo/patiented  Enter V615 in the search box to learn more about \"Learning About Rh Immunoglobulin Shots.\"  Current as of: July 10, 2023               Content Version: 14.0    7432-8260 [employer].   Care instructions adapted under license by your healthcare professional. If you have questions about a medical condition or this instruction, always ask your healthcare professional. [employer] disclaims any warranty or liability for your use of this information.      Rubella ([place] Measles): Care Instructions  Overview  Rubella, also called [place] measles or 3-day measles, is a disease caused by a virus. It spreads by coughs, sneezes, and close contact. Rubella usually is mild and does not cause long-term problems. But if you are pregnant and get it, you can give the disease to your unborn baby. This can cause serious birth defects.  While you have rubella, you may get a rash and a mild fever, and the lymph glands in your neck may swell. Older children often have a fever, eye pain, a sore throat, and body aches. You can relieve most symptoms with care at home. Avoid being around others, especially pregnant people, until your rash has been gone for at least 4 days. People who have not had this disease before or have not had the vaccine have the greatest chance of getting the virus.  Follow-up care is a key part of your treatment and safety. Be sure to make and go to all appointments, and call your doctor if you are having problems. It's also a good " "idea to know your test results and keep a list of the medicines you take.  How can you care for yourself at home?  Drink plenty of fluids. If you have kidney, heart, or liver disease and have to limit fluids, talk with your doctor before you increase the amount of fluids you drink.  Get plenty of rest to help your body heal.  Take an over-the-counter pain medicine, such as acetaminophen (Tylenol), ibuprofen (Advil, Motrin), or naproxen (Aleve), to reduce fever and discomfort. Read and follow all instructions on the label. Do not give aspirin to anyone younger than 20. It has been linked to Reye syndrome, a serious illness.  Do not take two or more pain medicines at the same time unless the doctor told you to. Many pain medicines have acetaminophen, which is Tylenol. Too much acetaminophen (Tylenol) can be harmful.  Try not to scratch the rash. Put cold, wet cloths on the rash to reduce itching.  Do not smoke. Smoking can make your symptoms worse. If you need help quitting, talk to your doctor about stop-smoking programs and medicines. These can increase your chances of quitting for good.  Avoid contact with people who have never had rubella and who have not been immunized.  When should you call for help?   Call your doctor now or seek immediate medical care if:    You have a fever with a stiff neck or a severe headache.     You are sensitive to light or feel very sleepy or confused.   Watch closely for changes in your health, and be sure to contact your doctor if:    You do not get better as expected.   Where can you learn more?  Go to [email or web address]/[email or web address]  Enter B812 in the search box to learn more about \"Rubella (D[place] Measles): Care Instructions.\"  Current as of: June 12, 2023               Content Version: 14.0    0663-5727 [employer].   Care instructions adapted under license by your healthcare professional. If you have questions about a medical condition or this instruction, " always ask your healthcare professional. Healthwise, [facility] disclaims any warranty or liability for your use of this information.      Gonorrhea and Chlamydia: About These Tests  What is it?  These tests use a sample of urine or other body fluid to look for the bacteria that cause these sexually transmitted infections (STIs). The fluid sample can come from the cervix, vagina, rectum, throat, or eyes.  Why is this test done?  These tests may be done to:  Find out if symptoms are caused by gonorrhea or chlamydia.  Check people who are at high risk of being infected with gonorrhea or chlamydia.  Retest people several months after they have been treated for gonorrhea or chlamydia.  Check for infection in your  if you had a gonorrhea or chlamydia infection at the time of delivery.  How can you prepare for the test?  If you are going to have a urine test, do not urinate for at least 1 hour before the test.  If you think you may have chlamydia or gonorrhea, don't have sexual intercourse until you get your test results. And you may want to have tests for other STIs, such as HIV.  How is the test done?  For a direct sample, a swab is used to collect body fluid from the cervix, vagina, rectum, throat, or eyes. Your doctor may collect the sample. Or you may be given instructions on how to collect your own sample.  For a urine sample, you will collect the urine that comes out when you first start to urinate. Don't wipe the genital area clean before you urinate.  How long does the test take?  The test will take a few minutes.  What happens after the test?  You will be able to go home right away.  You can go back to your usual activities right away.  If you do have an infection, don't have sexual intercourse for 7 days after you start treatment. And your sex partner(s) should also be treated.  Follow-up care is a key part of your treatment and safety. Be sure to make and go to all appointments, and call your doctor  "if you are having problems. It's also a good idea to keep a list of the medicines you take. Ask your doctor when you can expect to have your test results.  Where can you learn more?  Go to https://www.[email or web address].net/patiented  Enter K976 in the search box to learn more about \"Gonorrhea and Chlamydia: About These Tests.\"  Current as of: November 27, 2023               Content Version: 14.0    2447-2066 [employer].   Care instructions adapted under license by your healthcare professional. If you have questions about a medical condition or this instruction, always ask your healthcare professional. [employer] disclaims any warranty or liability for your use of this information.      Trichomoniasis: About This Test  What is it?     This test uses a sample of urine or other body fluid to look for the tiny parasite that causes trichomoniasis (also called trich). The fluid sample can come from the vagina, cervix, or urethra. Your doctor may choose to use one or more of many available tests.  Why is it done?  A trich test may be done to:  Find out if symptoms are caused by trich.  Check people who are at high risk for being infected with trich.  Check after treatment to make sure that the infection is gone.  How do you prepare for the test?  If you are going to have a urine test, do not urinate for at least 1 hour before the test.  How is the test done?  For a direct sample, a swab is used to collect body fluid from the cervix, vagina, or urethra. Your doctor may collect the sample. Or you may be given instructions on how to collect your own sample.  For a urine sample, you will collect the urine that comes out when you first start to urinate. Don't wipe the area clean before you urinate.  How long does the test take?  It will take a few minutes to collect a sample.  What happens after the test?  You can go home right away.  You can go back to your usual activities right away.  You may get the " test results the same day or several days later. It depends on the test used.  If you do have an infection, don't have sexual intercourse for 7 days after you start treatment. Your sex partner or partners should also be treated.  Follow-up care is a key part of your treatment and safety. Be sure to make and go to all appointments, and call your doctor if you are having problems. Ask your doctor when you can expect to have your test results.  Current as of: November 27, 2023               Content Version: 14.0    0924-8482 [employer].   Care instructions adapted under license by your healthcare professional. If you have questions about a medical condition or this instruction, always ask your healthcare professional. [employer] disclaims any warranty or liability for your use of this information.      HIV Testing: Care Instructions  Overview  You can get tested for the human immunodeficiency virus (HIV). Most doctors use a blood test to check for HIV antibodies and antigens in your blood. It may also check for the genetic material (RNA) of HIV. Some tests use saliva to check for HIV antibodies. But these aren't as accurate. For example, they may give a false result if you've just been infected.  What do the results mean?    Normal (negative)    No HIV antibodies, antigens, or RNA were found.  You may need more testing. It can make sure your test results are correct.    Uncertain (indeterminate)    Test results didn't clearly show if you have an HIV infection.  HIV antibodies or antigens may not have formed yet.  Some other type of antibody or antigen may have affected the results.  You will need another test to be sure.    Abnormal (positive)    HIV antibodies, antigens, or RNA were found.  If you haven't had an RNA test yet, one will be done. If it's positive, you have HIV.  If your test result is positive, your doctor will talk to you. You will discuss starting treatment.  Follow-up care  "is a key part of your treatment and safety. Be sure to make and go to all appointments, and call your doctor if you are having problems. It's also a good idea to know your test results and keep a list of the medicines you take.  Where can you learn more?  Go to [email or web address]/patiented  Enter T792 in the search box to learn more about \"HIV Testing: Care Instructions.\"  Current as of: June 12, 2023               Content Version: 14.0    8835-0621 [employer].   Care instructions adapted under license by your healthcare professional. If you have questions about a medical condition or this instruction, always ask your healthcare professional. [employer] disclaims any warranty or liability for your use of this information.      Hepatitis C Virus Tests: About These Tests  What are they?     Hepatitis C virus tests are blood tests that check for substances in the blood that show whether you have hepatitis C now or had it in the past. The tests can also tell you what type of hepatitis C you have and how severe the disease is. This can help your doctor with treatment.  If the tests show that you have long-term hepatitis C, you need to take steps to prevent spreading the disease.  Why are these tests done?  You may need these tests if:  You have symptoms of hepatitis.  You may have been exposed to the virus. You are more likely to have been exposed to the virus if you inject drugs or are exposed to body fluids (such as if you are a health care worker).  You've had other tests that show you have liver problems.  You are 18 to 79 years old.  You have an HIV infection.  The tests also are done to help your doctor decide about your treatment and see how well it works.  How do you prepare for the test?  In general, there's nothing you have to do before this test, unless your doctor tells you to.  How is the test done?  A health professional uses a needle to take a blood sample, usually from " "the arm.  What happens after these tests?  You will probably be able to go home right away.  You can go back to your usual activities right away.  Follow-up care is a key part of your treatment and safety. Be sure to make and go to all appointments, and call your doctor if you are having problems. It's also a good idea to keep a list of the medicines you take. Ask your doctor when you can expect to have your test results.  Where can you learn more?  Go to https://www.[employer].net/patiented  Enter W551 in the search box to learn more about \"Hepatitis C Virus Tests: About These Tests.\"  Current as of: June 12, 2023               Content Version: 14.0    1724-8545 [employer].   Care instructions adapted under license by your healthcare professional. If you have questions about a medical condition or this instruction, always ask your healthcare professional. [employer] disclaims any warranty or liability for your use of this information.      Learning About Fetal Ultrasound Results  What is a fetal ultrasound?     Fetal ultrasound is a test that lets your doctor see an image of your baby. Your doctor learns information about your baby from this picture. You may find out, for example, if you are having a boy or a girl. But the main reason you have this test is to get information about your baby's health.  (You may hear your baby called a fetus. This is a common medical term for a baby that's growing in the mother's uterus.)  What kind of information can you learn from this test?  The findings of an ultrasound fall into two categories, normal and abnormal.  Normal  The fetus is the right size for its age.  The placenta is the expected size and does not cover the cervix.  There is enough amniotic fluid in the uterus.  No birth defects can be seen.  Abnormal  The fetus is small or large for its age.  The placenta covers the cervix.  There is too much or too little amniotic fluid in the " uterus.  The fetus may have a birth defect.  What does an abnormal result mean?  Abnormal seems to imply that something is wrong with your baby. But what it means is that the test has shown something the doctor wants to take a closer look at.  And that's what happens next. Your doctor will talk to you about what further test or tests you may need.  What do the results mean?  Some of the things your doctor may see on an abnormal ultrasound include:  Echogenic bowel.  The bowel looks very bright on the screen. This could mean that there's blood in the bowel. Or it could mean that something is blocking the small bowel.  Increased nuchal translucency.  The ultrasound measures the thickness at the back of the baby's neck. An increase in thickness is sometimes an early sign of Down syndrome.  Increased or decreased amniotic fluid.  The doctor will look for a reason for the level of amniotic fluid and will watch the pregnancy closely as it progresses.  Large ventricles.  Ventricles in the brain look larger than they should. Your doctor may take a closer look at the brain.  Renal pyelectasis/hydronephrosis.  The ultrasound measures the fluid around the kidney. If there is more fluid than expected, there is a chance of urinary tract or kidney problems.  Short long bones.  The ultrasound measures certain arm and leg bones. A long bone (humerus or femur) that is shorter than average could be a sign of Down syndrome.  Subchorionic hemorrhage.  An ultrasound can show bleeding under one of the membranes that surrounds the fetus. Some women don't have symptoms of bleeding. The ultrasound can find this problem when women are not bleeding from their vagina. Women who have this condition have a slightly higher chance of miscarriage.  What do you do now?  Take a deep breath, and let it out. Keep in mind that an abnormal finding on an ultrasound, after it's coupled with more information, may:  Turn out to be nothing.  Turn out to be  "something mild that won't affect the baby.  Turn out to be something more serious. But if this happens, early diagnosis helps you and your doctor plan treatment options sooner rather than later.  Your medical team is there for you. So are your family and friends. Ask questions, and get the help and support you need.  Follow-up care is a key part of your treatment and safety. Be sure to make and go to all appointments, and call your doctor if you are having problems. It's also a good idea to know your test results and keep a list of the medicines you take.  Where can you learn more?  Go to https://www.[employer].net/patiented  Enter K451 in the search box to learn more about \"Learning About Fetal Ultrasound Results.\"  Current as of: July 10, 2023               Content Version: 14.0    8678-3455 [employer].   Care instructions adapted under license by your healthcare professional. If you have questions about a medical condition or this instruction, always ask your healthcare professional. [employer] disclaims any warranty or liability for your use of this information.      Learning About Prenatal Visits  Overview     Regular prenatal visits are very important during any pregnancy. These quick office visits may seem simple and routine. But they can help you have a safe and healthy pregnancy. Your doctor is watching for problems that can only be found through regular checkups. The visits also give you and your doctor time to build a good relationship.  After your first visit, you will most likely start on a schedule of monthly visits. In your third trimester, the visits will get more frequent. Based on your health, your age, and if you've had a normal, full-term pregnancy before, your doctor may want to see you more or less often.  At different times in your pregnancy, you will have exams and tests. Some are routine. Others are done only when there is a chance of a problem. Everything healthy " you do for your body helps you have a healthy pregnancy. Rest when you need it. Eat well, drink plenty of water, and exercise regularly.  What happens during a prenatal visit?  You will have blood pressure checks, along with urine tests. You also may have blood tests. If you need to go to the bathroom while waiting for the doctor, tell the nurse. You will be given a sample cup so your urine can be tested.  You will be weighed and have your belly measured.  Your doctor may listen to the fetal heartbeat with a special device.  At about 24 weeks, and possibly earlier in your pregnancy, your doctor will check your blood sugar (glucose tolerance test) for diabetes that can occur during pregnancy. This is gestational diabetes, which can be harmful.  You will have tests to check for infections that could harm your . These include group B streptococcus and hepatitis B.  Your doctor may do ultrasounds to check for problems. This also checks the position of the fetus. An ultrasound uses sound waves to produce a picture of the fetus.  You may get your vaccines updated.  Your doctor may ask you questions to check for signs of anxiety or depression. Tell your doctor if you feel sad, anxious, or hopeless for more than a few days.  You may have other tests at any time during your pregnancy.  Use your visits to discuss with your doctor any concerns you have.  How can you care for yourself at home?  Get plenty of rest.  Try to exercise every day, if your doctor says it is okay. If you have not exercised in the past, start out slowly. For example, you can take short walks each day.  Choose healthy foods, such as fruits, vegetables, whole grains, lean proteins, low-fat dairy, and healthy fats.  Drink plenty of fluids. Cut down on drinks with caffeine, such as coffee, tea, and cola. If you have kidney, heart, or liver disease and have to limit fluids, talk with your doctor before you increase the amount of fluids you drink.  Try  "to avoid chemical fumes, paint fumes, and poisons.  If you smoke, vape, or use alcohol, marijuana, or other drugs, quit or cut back as much as you can. Talk to your doctor if you need help quitting.  Review all of your medicines, including over-the-counter medicines and supplements, with your doctor. Some of your routine medicines may need to be changed. Do not stop or start taking any medicines without talking to your doctor first.  Follow-up care is a key part of your treatment and safety. Be sure to make and go to all appointments, and call your doctor if you are having problems. It's also a good idea to know your test results and keep a list of the medicines you take.  Where can you learn more?  Go to https://www.[email or web address].net/patiented  Enter J502 in the search box to learn more about \"Learning About Prenatal Visits.\"  Current as of: July 10, 2023               Content Version: 14.0    5802-4357 [employer].   Care instructions adapted under license by your healthcare professional. If you have questions about a medical condition or this instruction, always ask your healthcare professional. [employer] disclaims any warranty or liability for your use of this information.      Intimate Partner Violence: Care Instructions  Overview     If you want to save this information but don't think it is safe to take it home, see if a trusted friend can keep it for you. Plan ahead. Know who you can call for help, and memorize the phone number.   Be careful online too. Your online activity may be seen by others. Do not use your personal computer or device to read about this topic. Use a safe computer, such as one at work, a friend's home, or a library.    Intimate partner violence--a type of domestic abuse--is different from an argument now and then. It is a pattern of abuse that one person may use to control another person's behavior. It may start with threats and name-calling. Then, it may lead to " more serious acts, like pushing and slapping. The abuse also may occur in other areas. For example, the abuser may withhold money or spend a partner's money without their knowledge.  Abuse can cause serious harm. You are more likely to have a long-term health problem from the injuries and stress of living in a violent relationship. People who are sexually abused by their partners have more sexually transmitted infections and unplanned pregnancies. Anyone who is abused also faces emotional pain. Anyone can be abused in relationships. In some relationships, both people use abusive behavior.  If you are pregnant, abuse can cause problems such as poor weight gain, infections, and bleeding. Abuse during this time may increase your baby's risk of low birth weight, premature birth, and death.  Follow-up care is a key part of your treatment and safety. Be sure to make and go to all appointments, and call your doctor if you are having problems. It's also a good idea to know your test results and keep a list of the medicines you take.  How can you care for yourself at home?  If you do not have a safe place to stay, discuss this with your doctor before you leave.  Have a plan for where to go, how to leave your home, and where to stay in case of an emergency. Do not tell your partner about your plan. Contact:  The National Domestic Violence Hotline toll-free at 1-837.891.3346. They can help you find resources in your area.  Your local police department, hospital, or clinic for information about shelters and safe homes near you.  Talk to a trusted friend or neighbor, a counselor, or a rafal leader. Do not feel that you have to hide what happened.  Teach your children how to call for help in an emergency.  Be alert to warning signs, such as threats, heavy alcohol use, or drug use. This can help you avoid danger.  If you can, make sure that there are no guns or other weapons in your home.  When should you call for help?   Call 058  "anytime you think you may need emergency care. For example, call if:    You or someone else has just been abused.     You think you or someone else is in danger of being abused.   Watch closely for changes in your health, and be sure to contact your doctor if you have any problems.  Where can you learn more?  Go to https://www.[employer].net/patiented  Enter G282 in the search box to learn more about \"Intimate Partner Violence: Care Instructions.\"  Current as of: June 24, 2023               Content Version: 14.0    4580-8397 [employer].   Care instructions adapted under license by your healthcare professional. If you have questions about a medical condition or this instruction, always ask your healthcare professional. [employer] disclaims any warranty or liability for your use of this information.      Intimate Partner Violence Safety Instructions: Care Instructions  Overview     If you want to save this information but don't think it is safe to take it home, see if a trusted friend can keep it for you. Plan ahead. Know who you can call for help, and memorize the phone number.   Be careful online too. Your online activity may be seen by others. Do not use your personal computer or device to read about this topic. Use a safe computer, such as one at work, a friend's home, or a library.    When you are abused by a spouse or partner, you can take actions to protect yourself and your children.  You can increase your safety whether you decide to stay with your spouse or partner or you decide to leave. You may want to make a safety plan and pack a bag ahead of time. This will help you leave quickly when you decide to. Remember, you cannot change your partner's actions, but you can find help for you and your children. No one deserves to be abused.  Follow-up care is a key part of your treatment and safety. Be sure to make and go to all appointments, and call your doctor if you are having " problems. It's also a good idea to know your test results and keep a list of the medicines you take.  How can you care for yourself at home?  Make a plan for your safety   If you decide to stay with your abusive spouse or partner, you can do the following to increase your safety:  Decide what works best to keep you safe in an emergency.  Know who you can call to help you in an emergency.  Decide if you will call the police if you get hurt again. If you can, agree on a signal with your children or neighbor to call the police for you if you need help. You can flash lights or hang something out of a window.  Choose a safe place to go for a short time if you need to leave home. Memorize the address and phone number.  Learn escape routes out of your home in case you need to leave in a hurry. Teach your children different ways to get out of your home quickly if they need to.  If you can, hide or lock up things that can be used as weapons (guns, knives, hammers).  Learn the number of a domestic violence shelter. Talk to the people there about how they can help.  Find out about other community resources that can help you.  Take pictures of bruises or other injuries if you can. You can also take pictures of things your abuser has broken.  Teach your children that violence is never okay. Tell them that they do not deserve to be hurt.  Pack a bag   Prepare a bag with things you will need if you leave suddenly. Leave it with a friend, a relative, or someone else you trust. You could include the following items in the bag:  A set of keys to your home and car.  Emergency phone numbers and addresses.  Money such as cash or checks. You can also ask a friend, a relative, or someone else you trust to hold money for you.  Copies of legal documents such as house and car titles or rent receipts, birth certificates, Social Security card, voter registration, marriage and 's licenses, and your children's health records.  Personal  "items you would need for a few days, such as clothes, a toothbrush, toothpaste, and any medicines you or your children need.  A favorite toy or book for your child or children.  Diapers and bottles, if you have very young children.  Pictures that show signs of abuse and violence. You may also add pictures of your abuser.  If you leave   If you decide to leave, you can take the following steps:  Go to the emergency room at a hospital if you have been hurt.  Think about asking the police to be with you as you leave. They can protect you as you leave your home.  If you decide to leave secretly, remember that activities can be tracked. Your abuser may still have access to your cell phone, email, and credit cards. It may be possible for these to be traced. Always be aware of your surroundings.  If this is an emergency, do not worry about gathering up anything. Just leave--your safety is most important.  If your abuser moves out, change the locks on the doors. If you have a security system, change the access code.  When should you call for help?   Call 911 anytime you think you may need emergency care. For example, call if:    You or someone else has just been abused.     You think you or someone else is in danger of being abused.   Watch closely for changes in your health, and be sure to contact your doctor if you have any problems.  Where can you learn more?  Go to https://www.[employer].net/patiented  Enter A752 in the search box to learn more about \"Intimate Partner Violence Safety Instructions: Care Instructions.\"  Current as of: June 24, 2023               Content Version: 14.0    5530-5297 [employer].   Care instructions adapted under license by your healthcare professional. If you have questions about a medical condition or this instruction, always ask your healthcare professional. [employer] disclaims any warranty or liability for your use of this information.      Learning About " Intimate Partner Violence  What is intimate partner violence?  Intimate partner violence is a type of domestic abuse. It's threatening, emotionally harmful, or violent behavior in a personal relationship. It can happen between past or current partners or spouses. In some relationships both people abuse each other. One partner may be more abusive. Or the abuse may be equal.  Abuse can affect people of any ethnic group, race, or Gnosticism. It can affect teens, adults, or the elderly. And it can happen to people of any sexual orientation, gender, or social status.  Abusers use fear, bullying, and threats to control their partners. They may control what their partners do. They may control where their partners go or who they see. They may act jealous, controlling, or possessive. These early signs of abuse may happen soon after the start of the relationship. Sometimes it can be hard to notice abuse at first. But after the relationship becomes more serious, the abuse may get worse.  If you are being abused in your relationship, it's important to get help. The abuse is not your fault. You don't have to face it alone.  Be careful  It may not be safe to take home domestic abuse information like this handout. Some people ask a trusted friend to keep it for them. It's also important to plan ahead and to memorize the phone number of places you can go for help. If you are concerned about your safety, do not use your computer, smartphone, or tablet to read about domestic abuse.   What are the types of intimate partner violence?  Abuse can happen in different ways. Each type can happen on its own or in combination with others.  Emotional abuse  Emotional abuse is a pattern of threats, insults, or controlling behavior. It includes verbal abuse. It goes beyond healthy disagreements in a relationship. It's a sign of an unhealthy relationship.  Do you feel threatened, intimidated, or controlled?  Does your partner:  Threaten your  children, other family members, or pets?  Use jokes meant to embarrass or shame you?  Call you names?  Tell you that you are a bad parent?  Threaten to take away your children?  Threaten to have you or your family members deported?  Control your access to money or other basic needs?  Control what you do, who you see or talk to, or where you go?  Another form of emotional abuse is denying that it is happening. Or the abuser may act like the abuse is no big deal or is your fault.  Sexual abuse  With sexual abuse, abusers may try to convince or force you to have sex. They may force you into sex acts you're not comfortable with. Or they may sexually assault you. Sexual abuse can happen even if you are in a committed relationship.  Physical abuse  Physical abuse means that a partner hits, kicks, or does something else to physically hurt you. Physical abuse that starts with a slap might lead to kicking, shoving, and choking over time. The abuser may also threaten to hurt or kill you.  Stalking  Stalking means that an abuser gives you attention that you do not want and that causes you fear. Examples of stalking include:  Following you.  Showing up at places where the abuser isn't invited, such as at your work or school.  Constantly calling or texting you.  What problems can  to?  Intimate partner violence can be very dangerous. It can cause serious, repeated injury. It can even lead to death.  All forms of abuse can cause long-term health problems from the stress of a violent relationship. Verbal abuse can lead to sexual and physical abuse.  Abuse causes:  Emotional pain.  Depression.  Anxiety.  Post-traumatic stress.  Sexual abuse can lead to sexually transmitted infections (such as HIV/AIDS) and unplanned pregnancy.  Pregnancy can be a very dangerous time for people in abusive relationships. Abuse can cause or increase the risk of problems during pregnancy. These include low weight gain, anemia, infections, and  "bleeding. Abuse may also increase your baby's risk of low birth weight, premature birth, and death.  It can be hard for some victims of abuse to ask for help or to leave their relationship. You may feel scared, stuck, or not sure what steps to take. But it's important not to ignore abuse. Talking to someone you trust could be the first step to ending the abuse and taking care of your own health and happiness again. There are resources available that can help keep you safe.  Where can you get help?  Talk to a trusted friend. Find a local advocacy group, or talk to your doctor about the abuse.  Contact the National Domestic Violence Hotline at 6-275-979-AYZJ (1-327.458.2082) for more safety tips. They can guide you to groups in your area that can help. Or go to the National Coalition Against Domestic Violence website at www.thehotliwi.org to learn more.  Domestic violence groups or a counselor in your area can help you make a safety plan for yourself and your children.  When to call for help  Call 911 anytime you think you may need emergency care. For example, call if:  You think that you or someone you know is in danger of being abused.  You have been hurt and can't have someone safely take you to emergency care.  You have just been abused.  A family member has just been abused.  Where can you learn more?  Go to https://www.[employer].net/patiented  Enter S665 in the search box to learn more about \"Learning About Intimate Partner Violence.\"  Current as of: June 24, 2023               Content Version: 14.0    5294-9296 [employer].   Care instructions adapted under license by your healthcare professional. If you have questions about a medical condition or this instruction, always ask your healthcare professional. [employer] disclaims any warranty or liability for your use of this information.      Vaginal Bleeding During Pregnancy: Care Instructions  Overview     It's common to have some " vaginal spotting when you are pregnant. In some cases, the bleeding isn't serious. And there aren't any more problems with the pregnancy.  But sometimes bleeding is a sign of a more serious problem. This is more common if the bleeding is heavy or painful. Examples of more serious problems include miscarriage, an ectopic pregnancy, and a problem with the placenta.  You may have to see your doctor again to be sure everything is okay. You may also need more tests to find the cause of the bleeding.  Home treatment may be all you need. But it depends on what is causing the bleeding. Be sure to tell your doctor if you have any new symptoms or if your symptoms get worse.  The doctor has checked you carefully, but problems can develop later. If you notice any problems or new symptoms, get medical treatment right away.  Follow-up care is a key part of your treatment and safety. Be sure to make and go to all appointments, and call your doctor if you are having problems. It's also a good idea to know your test results and keep a list of the medicines you take.  How can you care for yourself at home?  If your doctor prescribed medicines, take them exactly as directed. Call your doctor if you think you are having a problem with your medicine.  Do not have vaginal sex until your doctor says it's okay.  Do not put anything in your vagina until your doctor says it's okay.  Ask your doctor about other activities you can or can't do.  Get a lot of rest. Being pregnant can make you tired.  Do not use nonsteroidal anti-inflammatory drugs (NSAIDs), such as ibuprofen (Advil, Motrin), naproxen (Aleve), or aspirin, unless your doctor says it is okay.  When should you call for help?   Call 911 anytime you think you may need emergency care. For example, call if:    You passed out (lost consciousness).     You have severe vaginal bleeding. This means you are soaking through a pad each hour for 2 or more hours.     You have sudden, severe pain  "in your belly or pelvis.   Call your doctor now or seek immediate medical care if:    You have new or worse vaginal bleeding.     You are dizzy or lightheaded, or you feel like you may faint.     You have pain in your belly, pelvis, or lower back.     You think that you are in labor.     You have a sudden release of fluid from your vagina.     You've been having regular contractions for an hour. This means that you've had at least 8 contractions within 1 hour or at least 4 contractions within 20 minutes, even after you change your position and drink fluids.     You notice that your baby has stopped moving or is moving much less than normal.   Watch closely for changes in your health, and be sure to contact your doctor if you have any problems.  Where can you learn more?  Go to https://www.[employer].net/patiented  Enter N829 in the search box to learn more about \"Vaginal Bleeding During Pregnancy: Care Instructions.\"  Current as of: July 10, 2023               Content Version: 14.0    2190-6016 [employer].   Care instructions adapted under license by your healthcare professional. If you have questions about a medical condition or this instruction, always ask your healthcare professional. [employer] disclaims any warranty or liability for your use of this information.      "

## 2024-05-15 NOTE — PROGRESS NOTES
"SUBJECTIVE:      HPI:     This is a 28 year old female patient,  who presents for her first obstetrical visit.     FLACO: 2025, by Est. Date of Conception.  She is 5w6d weeks.  Her cycles are irregular.  Her last menstrual period was abnormal.   Since her LMP, she has experienced  fatigue and cramping, and some cold symptoms including cough and congestion ).   She denies nausea, emesis, abdominal pain, hemorrhoids, constipation and vaginal bleeding.     Additional History:   -1st pregnancy  -Pt unsure of LMP - she was on continuous birth control and did not get periods. She reports she stopped her birth control on 3/31 due to issues with insomnia. Once she stopped her OCP, her insomnia diminished. She reports she started bleeding after stopping her OCPs around 4/10 and bled for about 4 days afterwards. Believes her DOC was on .   -Pt took a pregnancy test a few weeks ago - she was doing her typical workout routine and felt short of breath when she usually does not with that workout - took a UPT and it was positive on   -Pt was using THC gummies but stopped once she found out she was pregnant   -Pt does take several supplements/herbs. She has been taking cod liver oil, pt advised to stop this as it contains high levels of vitamin A, omega 3 supplements are fine. Pt reports she has been taking [employer] blend of beef, liver and kidney supplements as well as some \"sleep herbs\"  -Informed pt to use herbs with caution in pregnancy as there are not many studies on the safety of different herbs and supplements. Reviewed with pt to avoid ashwagadha and elderberry and to use caution with other supplements/herbs. Pt verbalized understanding.  -She has been experiencing some cold symptoms last few days - cough/stuffy nose. Reviewed safe medications to take in pregnancy for symptoms  --------    Here today for new OB visit --first pregnancy.  Unplanned but okay and welcomed.  Stopped her OCP in late March due to " "sleep issues/insomnia.  Had 4d withdrawal bleed at that time but no bleeding since.  Noted shortness of breath and unusual fatigue with a later workout and elected to take UPT which was suprisingly positive.  Had not been using contraception.  Unsure of date of conception (contrary to prior conversation with RN)  US today shows viable IUP at 6+3wks.  Will use EDC 1/11/25.  No vb/spotting.  No cramping more than menstrual like cramping.  +fatigue.  Had mild nausea today but feels she underate yesterday and went to be hungry improved with more regular diet today.  No bowel/bladder issues  Otherwise healthy  +hx HSV --has not had any recent outbreaks; discussed valtrex at 36wks  -interested in genetic screening; would like to know sex of baby  -several questions about \"natural delivery\" and delivery practices; wondering about home or birth center delivery; may consider meeting with midwife service but understands that they do not perform deliveries outside of the hospital; discussed rare but unpredictable complications with delivery and importance of having immediate/necessary resources; open to conversation  -wonders about exercise; active in yoga     Have you travelled during the pregnancy?No  Have your sexual partner(s) travelled during the pregnancy?No        HISTORY:   Planned Pregnancy: No - surprise. Pt went off OCPs due to insomnia, her partner and her were thinking about trying to conceive in about a year or two.   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   Living in Household: Significant Other     Past History:  Her past medical history   Past Medical History        Past Medical History:   Diagnosis Date    Acne      Encounter for IUD removal 09/17/2013     inserted by Dr Egan    Genital herpes 09/03/2013    Immunizations up to date      Uses oral contraception        . "       She has a history of   1st pregnancy      Since her last LMP she admits to the use of thc gummies . She has since stopped this since finding out she was pregnant      Past medical, surgical, social and family history were reviewed and updated in EPIC.     Confirmed patient desires delivery at [facility] with the [facility] provider.     Nurse phone visit completed. Prenatal book and folder (containing standard educational hand-outs and brochures) will be given at the next visit to patient. Information in folder reviewed over the phone. Questions answered. Brochure given on optional screening available to assess chromosomal anomalies. Pt desires NIPS. Pt advised to call the clinic if she has any questions or concerns related to her pregnancy. Prenatal labs future ordered. New prenatal visit scheduled on 5/21 with Dr. Egan.           Lab Results   Component Value Date     PAP NIL 02/04/2019         PHQ-9 score:         5/13/2024     2:10 PM   PHQ   PHQ-9 Total Score 2   Q9: Thoughts of better off dead/self-harm past 2 weeks Not at all                 9/10/2021     2:39 PM 6/8/2023     1:55 PM 5/13/2024     2:10 PM   BRITTANY-7 SCORE   Total Score 2 0 0            Patient supplied answers from flow sheet for:  Prenatal OB Questionnaire.  Past Medical History  Have you ever recieved care for your mental health? : (!) Yes (Hx anxiety when younger - was in therapy but not currently, never any medications)  Have you ever been in a major accident or suffered serious trauma?: No  Within the last year, has anyone hit, slapped, kicked or otherwise hurt you?: No  In the last year, has anyone forced you to have sex when you didn't want to?: No     Past Medical History 2   Have you ever received a blood transfusion?: No  Would you accept a blood transfusion if was medically recommended?: Yes  Does anyone in your home smoke?: No   Is your blood type Rh negative?: Unknown  Have you ever  ?: No  Have you been hospitalized for a nonsurgical reason excluding normal delivery?: No (~8 years old was hospitalized for dehydration for the flu, staryed overnight)  Have you ever had an abnormal pap smear?: No     Past Medical History (Continued)  Do you have a history of abnormalities of the uterus?: No  Did your mother take EDIN or any other hormones when she was pregnant with you?: No  Do you have any other problems we have not asked about which you feel may be important to this pregnancy?: No        Current Facility-Administered Medications          Current Outpatient Medications   Medication Sig Dispense Refill    magnesium 250 MG tablet Take 1 tablet by mouth daily        Prenatal Vit-Fe Fumarate-FA (PRENATAL MULTIVITAMIN  PLUS IRON) 27-1 MG TABS Take by mouth daily        valACYclovir (VALTREX) 500 MG tablet Take 1 tablet (500 mg) by mouth 2 times daily 6 tablet 3    cod liver oil CAPS capsule  (Patient not taking: Reported on 5/13/2024)          No current facility-administered medications for this visit.            ROS:   12 point review of systems negative other than symptoms noted below or in the HPI.  Constitutional: Fatigue  Gastrointestinal: cramping        OBJECTIVE:     EXAM:  LMP 04/10/2024  There is no height or weight on file to calculate BMI.    GENERAL: alert and no distress  EYES: Eyes grossly normal to inspection, PERRL and conjunctivae and sclerae normal  HENT: ear canals and TM's normal, nose and mouth without ulcers or lesions  NECK: no adenopathy, no asymmetry, masses, or scars  RESP: lungs clear to auscultation - no rales, rhonchi or wheezes  CV: regular rate and rhythm, normal S1 S2, no S3 or S4, no murmur, click or rub, no peripheral edema  ABDOMEN: soft, nontender, no hepatosplenomegaly, no masses and bowel sounds normal  MS: no gross musculoskeletal defects noted, no edema  SKIN: no suspicious lesions or rashes  NEURO: Normal strength and tone, mentation intact and speech  normal  PSYCH: mentation appears normal, affect normal/bright    ASSESSMENT/PLAN:       ICD-10-CM    1. Encounter for supervision of low-risk first pregnancy in first trimester  Z34.01 Urine Culture Aerobic Bacterial     *UA reflex to Microscopic          28 year old , On May 21, 2024 patient is 6w3d weeks of pregnancy with FLACO of 2025, by Ultrasound    Discussed as follows:genetic screening, hx HSV, safe delivery practices    Counseling given:   - Follow up in 4-6 weeks for return OB visit.  - Recommended weight gain for pregnancy: 25-35 lbs.      PLAN/PATIENT INSTRUCTIONS:    Patient Instructions   Avoid alcoholic beverages.  Discussed all genetic testing options.  Patient would like to do prequel testing at next visit.  Reviewed usual and expected course of pregnancy including visits, labs, imaging, vaccinations, etc.  May consider meeting with midwife service at next visit if desired  Long discussion today regarding risks of home birth or birthing center.  I highly recommend hospital for delivery in spite of Vanessa's low risk pregnancy given the unpredictable nature of labor and delivery.    RTC 4wks  Discussed HSV and need for anti-viral prophylaxis starting at 36 weeks.       **42 minutes spent on day of visit with chart review, ultrasound review, patient visit, exam and counseling and documentation/planning    Mela Egan MD

## 2024-05-20 LAB
ABO/RH(D): NORMAL
ANTIBODY SCREEN: NEGATIVE
SPECIMEN EXPIRATION DATE: NORMAL

## 2024-05-21 ENCOUNTER — ANCILLARY PROCEDURE (OUTPATIENT)
Dept: ULTRASOUND IMAGING | Facility: CLINIC | Age: 29
End: 2024-05-21
Payer: COMMERCIAL

## 2024-05-21 ENCOUNTER — PRENATAL OFFICE VISIT (OUTPATIENT)
Dept: OBGYN | Facility: CLINIC | Age: 29
End: 2024-05-21
Payer: COMMERCIAL

## 2024-05-21 VITALS
SYSTOLIC BLOOD PRESSURE: 116 MMHG | HEIGHT: 61 IN | DIASTOLIC BLOOD PRESSURE: 68 MMHG | WEIGHT: 173 LBS | BODY MASS INDEX: 32.66 KG/M2

## 2024-05-21 DIAGNOSIS — Z34.01 ENCOUNTER FOR SUPERVISION OF LOW-RISK FIRST PREGNANCY IN FIRST TRIMESTER: ICD-10-CM

## 2024-05-21 DIAGNOSIS — A60.04 HERPES SIMPLEX VULVOVAGINITIS: ICD-10-CM

## 2024-05-21 DIAGNOSIS — Z34.01 ENCOUNTER FOR SUPERVISION OF LOW-RISK FIRST PREGNANCY IN FIRST TRIMESTER: Primary | ICD-10-CM

## 2024-05-21 LAB
ALBUMIN UR-MCNC: NEGATIVE MG/DL
APPEARANCE UR: CLEAR
BILIRUB UR QL STRIP: NEGATIVE
COLOR UR AUTO: YELLOW
ERYTHROCYTE [DISTWIDTH] IN BLOOD BY AUTOMATED COUNT: 11.3 % (ref 10–15)
GLUCOSE UR STRIP-MCNC: NEGATIVE MG/DL
HCT VFR BLD AUTO: 39.2 % (ref 35–47)
HGB BLD-MCNC: 13.9 G/DL (ref 11.7–15.7)
HGB UR QL STRIP: NEGATIVE
KETONES UR STRIP-MCNC: NEGATIVE MG/DL
LEUKOCYTE ESTERASE UR QL STRIP: NEGATIVE
MCH RBC QN AUTO: 29 PG (ref 26.5–33)
MCHC RBC AUTO-ENTMCNC: 35.5 G/DL (ref 31.5–36.5)
MCV RBC AUTO: 82 FL (ref 78–100)
NITRATE UR QL: NEGATIVE
PH UR STRIP: 7 [PH] (ref 5–7)
PLATELET # BLD AUTO: 328 10E3/UL (ref 150–450)
RBC # BLD AUTO: 4.8 10E6/UL (ref 3.8–5.2)
SP GR UR STRIP: 1.02 (ref 1–1.03)
UROBILINOGEN UR STRIP-ACNC: 0.2 E.U./DL
WBC # BLD AUTO: 9.4 10E3/UL (ref 4–11)

## 2024-05-21 PROCEDURE — 86900 BLOOD TYPING SEROLOGIC ABO: CPT | Performed by: OBSTETRICS & GYNECOLOGY

## 2024-05-21 PROCEDURE — 86803 HEPATITIS C AB TEST: CPT | Performed by: OBSTETRICS & GYNECOLOGY

## 2024-05-21 PROCEDURE — 86901 BLOOD TYPING SEROLOGIC RH(D): CPT | Performed by: OBSTETRICS & GYNECOLOGY

## 2024-05-21 PROCEDURE — 86850 RBC ANTIBODY SCREEN: CPT | Performed by: OBSTETRICS & GYNECOLOGY

## 2024-05-21 PROCEDURE — 87086 URINE CULTURE/COLONY COUNT: CPT | Performed by: OBSTETRICS & GYNECOLOGY

## 2024-05-21 PROCEDURE — 36415 COLL VENOUS BLD VENIPUNCTURE: CPT | Performed by: OBSTETRICS & GYNECOLOGY

## 2024-05-21 PROCEDURE — 76817 TRANSVAGINAL US OBSTETRIC: CPT | Performed by: OBSTETRICS & GYNECOLOGY

## 2024-05-21 PROCEDURE — 81003 URINALYSIS AUTO W/O SCOPE: CPT | Performed by: OBSTETRICS & GYNECOLOGY

## 2024-05-21 PROCEDURE — 86762 RUBELLA ANTIBODY: CPT | Performed by: OBSTETRICS & GYNECOLOGY

## 2024-05-21 PROCEDURE — 87340 HEPATITIS B SURFACE AG IA: CPT | Performed by: OBSTETRICS & GYNECOLOGY

## 2024-05-21 PROCEDURE — 85027 COMPLETE CBC AUTOMATED: CPT | Performed by: OBSTETRICS & GYNECOLOGY

## 2024-05-21 PROCEDURE — 99215 OFFICE O/P EST HI 40 MIN: CPT | Performed by: OBSTETRICS & GYNECOLOGY

## 2024-05-21 PROCEDURE — 87389 HIV-1 AG W/HIV-1&-2 AB AG IA: CPT | Performed by: OBSTETRICS & GYNECOLOGY

## 2024-05-21 PROCEDURE — 86780 TREPONEMA PALLIDUM: CPT | Performed by: OBSTETRICS & GYNECOLOGY

## 2024-05-21 NOTE — PATIENT INSTRUCTIONS
Avoid alcoholic beverages.  Discussed all genetic testing options.  Patient would like to do prequel testing at next visit.  Reviewed usual and expected course of pregnancy including visits, labs, imaging, vaccinations, etc.  May consider meeting with midwife service at next visit if desired  Long discussion today regarding risks of home birth or birthing center.  I highly recommend hospital for delivery in spite of Vanessa's low risk pregnancy given the unpredictable nature of labor and delivery.    RTC 4wks  Discussed HSV and need for anti-viral prophylaxis starting at 36 weeks.

## 2024-05-22 LAB
HBV SURFACE AG SERPL QL IA: NONREACTIVE
HCV AB SERPL QL IA: NONREACTIVE
HIV 1+2 AB+HIV1 P24 AG SERPL QL IA: NONREACTIVE
RUBV IGG SERPL QL IA: 8.12 INDEX
RUBV IGG SERPL QL IA: POSITIVE
T PALLIDUM AB SER QL: NONREACTIVE

## 2024-05-23 LAB — BACTERIA UR CULT: NORMAL

## 2024-05-29 ENCOUNTER — TELEPHONE (OUTPATIENT)
Dept: OBGYN | Facility: CLINIC | Age: 29
End: 2024-05-29
Payer: COMMERCIAL

## 2024-05-29 NOTE — TELEPHONE ENCOUNTER
Agree with plan/recommendations; if bleeding were to worsen or continue, can consider hcg levels but okay to monitor at this point  Pelvic rest until bleeding subsides

## 2024-05-29 NOTE — TELEPHONE ENCOUNTER
Caller reporting the following red-flag symptom(s): pt 7w pregnant with mild to moderate cramping and some spotting    Per the system red-flag symptom policy, patient was instructed to:  speak with a Registered Nurse    Action:  Patient warm transferred to a Registered Nurse

## 2024-05-29 NOTE — TELEPHONE ENCOUNTER
7w4d    Pt calling due to reporting some spotting/cramping     Last night, pt went to bathroom and when wiped she had some light pink spotting.   Mostly very light pink - one time she wiped had a string of darker red blood     Put a panty liner on when went to bed and did not have anything on the panty liner/underwear    Dhe did report some very mild cramping     This AM - she woke up at 0430 - more cramping than has had this pregnancy  Felt intense for pt as she doesn't usually cramp during her period  Had to breathe through cramps - lasted half an hour and cramps subsided     Denies IC or constipation  Had BM last night - was regular for pt    Pt has not been exercising much this pregnancy, but she did exercise yesterday morning - was quite vigorous per pt    Reassured pt some light spotting and cramping can be common in early pregnancy, especially after recent bowel movement/increased activity     US 5/21/24:   Early pineda IUP with yolk sac and cardiac activity, measures 6w 3d by today's ultrasound     Recommend tylenol, heat packs or warm tub bath/shower for comfort if needed    ER precautions reviewed if pt having heavy bleeding - soaking through pad in 1 hour or less, passing blood clots egg size or bigger, or any severe pain     Routing to provider as FYI/anything different?    Miracle Thornton RN on 5/29/2024 at 9:23 AM

## 2024-05-29 NOTE — TELEPHONE ENCOUNTER
LVM to pt letting her know Dr. Egan reviewed plan/recommendations and agrees with current plan    Recommend pelvic rest until bleeding subsides    Pt to call if any worsening symptoms/concerns    Miracle Thornton RN on 5/29/2024 at 9:46 AM

## 2024-06-21 ENCOUNTER — PRENATAL OFFICE VISIT (OUTPATIENT)
Dept: OBGYN | Facility: CLINIC | Age: 29
End: 2024-06-21
Payer: COMMERCIAL

## 2024-06-21 VITALS — WEIGHT: 175 LBS | SYSTOLIC BLOOD PRESSURE: 108 MMHG | DIASTOLIC BLOOD PRESSURE: 60 MMHG | BODY MASS INDEX: 33.07 KG/M2

## 2024-06-21 DIAGNOSIS — Z3A.10 10 WEEKS GESTATION OF PREGNANCY: ICD-10-CM

## 2024-06-21 DIAGNOSIS — Z13.79 GENETIC SCREENING: ICD-10-CM

## 2024-06-21 DIAGNOSIS — Z34.01 ENCOUNTER FOR SUPERVISION OF LOW-RISK FIRST PREGNANCY IN FIRST TRIMESTER: Primary | ICD-10-CM

## 2024-06-21 PROCEDURE — 36415 COLL VENOUS BLD VENIPUNCTURE: CPT | Performed by: OBSTETRICS & GYNECOLOGY

## 2024-06-21 PROCEDURE — 99207 PR PRENATAL VISIT: CPT | Performed by: OBSTETRICS & GYNECOLOGY

## 2024-06-21 NOTE — PROGRESS NOTES
Doing well today.    More nausea and fatigue since out first visit but maybe starting to turn the corner  No bowel/bladder issues  Interested in prequel testing today  Still deciding on delivery plan; have ruled out home birth and toured a birthing center which they weren't overly impressed with; still very interested in water birth which they understand I do not perform or recommend.  I will have Vanessa see the CN service for her next visit  Would also really appreciate a hospital tour as they make their delivery decision/plan  RTC 4wks

## 2024-07-01 LAB — SCANNED LAB RESULT: NORMAL

## 2024-07-25 NOTE — PROGRESS NOTES
15w6d  Pt of Dr. Egan, meeting with CNMs to discuss waterbirth/low intervention birth.   Here with  partner Ang. They are still undecided about birth location: birth center vs home birth vs hospital birth.   Long discussion with couple about our CNM practice, philosophy of care, waterbirth (including risks and qualifications to have waterbirth), goal of minimal intervention, etc. All questions answered. We did discuss the basics of care in L&D including needing a reactive NST prior to moving to intermittent monitoring, we can accommodate very minimal cervical exams but at the very least need to know that baby is head down, etc.   They have not met with any home birth midwives, advised that if they are considering this route they should do so ASAP and also discuss with that home birth midwife what realtionships they have with [facility] hospitals in event of emergency or need for transfer. Discussed that neither CNMs nor MDs would take a home birth transfer at [facility], usually transferred to [facility].      Denies loss of fluid/vb/contractions/pelvic pain  AFP today  Declines GC/Chlamydia urine collection today  Anatomy ultrasound next visit between 19-21 weeks. Will plan to do with CNMs and discuss delivery location further at next appt.   Return to clinic 4 weeks    ANISH Cruz, CHARLES

## 2024-07-26 ENCOUNTER — PRENATAL OFFICE VISIT (OUTPATIENT)
Dept: MIDWIFE SERVICES | Facility: CLINIC | Age: 29
End: 2024-07-26
Payer: COMMERCIAL

## 2024-07-26 VITALS — DIASTOLIC BLOOD PRESSURE: 60 MMHG | WEIGHT: 175 LBS | SYSTOLIC BLOOD PRESSURE: 112 MMHG | BODY MASS INDEX: 33.07 KG/M2

## 2024-07-26 DIAGNOSIS — Z36.1 ENCOUNTER FOR ANTENATAL SCREENING FOR HIGH ALPHA-FETOPROTEIN LEVEL: ICD-10-CM

## 2024-07-26 DIAGNOSIS — Z34.02 ENCOUNTER FOR SUPERVISION OF LOW-RISK FIRST PREGNANCY IN SECOND TRIMESTER: Primary | ICD-10-CM

## 2024-07-26 DIAGNOSIS — Z36.89 ENCOUNTER FOR FETAL ANATOMIC SURVEY: ICD-10-CM

## 2024-07-26 DIAGNOSIS — A60.00 GENITAL HERPES SIMPLEX, UNSPECIFIED SITE: ICD-10-CM

## 2024-07-26 PROCEDURE — 82105 ALPHA-FETOPROTEIN SERUM: CPT | Mod: 90 | Performed by: ADVANCED PRACTICE MIDWIFE

## 2024-07-26 PROCEDURE — 99000 SPECIMEN HANDLING OFFICE-LAB: CPT | Performed by: ADVANCED PRACTICE MIDWIFE

## 2024-07-26 PROCEDURE — 99207 PR PRENATAL VISIT: CPT | Performed by: ADVANCED PRACTICE MIDWIFE

## 2024-07-26 PROCEDURE — 36415 COLL VENOUS BLD VENIPUNCTURE: CPT | Performed by: ADVANCED PRACTICE MIDWIFE

## 2024-07-26 RX ORDER — DESOXIMETASONE 2.5 MG/G
OINTMENT TOPICAL 2 TIMES DAILY PRN
COMMUNITY
Start: 2024-06-11

## 2024-07-30 LAB
# FETUSES US: NORMAL
AFP MOM SERPL: 0.99
AFP SERPL-MCNC: 30 NG/ML
AGE - REPORTED: 29.3 YR
CURRENT SMOKER: NO
FAMILY MEMBER DISEASES HX: NO
GA METHOD: NORMAL
GA: NORMAL WK
IDDM PATIENT QL: NO
INTEGRATED SCN PATIENT-IMP: NORMAL
SPECIMEN DRAWN SERPL: NORMAL

## 2024-08-04 ENCOUNTER — HEALTH MAINTENANCE LETTER (OUTPATIENT)
Age: 29
End: 2024-08-04

## 2024-08-12 ENCOUNTER — MYC MEDICAL ADVICE (OUTPATIENT)
Dept: MIDWIFE SERVICES | Facility: CLINIC | Age: 29
End: 2024-08-12
Payer: COMMERCIAL

## 2024-09-02 NOTE — PROGRESS NOTES
21w2d  Feels well, wants to review US, possible transfer to birth center or home birth. Partner has questions about delivery, philosophy, etc. She already has H2O info  Fetal movement: positive   Denies loss of fluid/vb/contractions  Anatomy ultrasound results discussed; to be reviewed by MD, having a Girl, Placenta: anterior, reviewed report and [facility]  GCT visit between 24-28 weeks, handout provided, reminded of longer appointment  Round ligament pain and comfort measures reviewed  CBE discussed, encouraged them to look into options.  Reviewed goal of healthy mom and baby, my belief is she can have a very natural supported, safe birth in the hospital but it is ultimately their choice on where to deliver.  Need request for [place] to do tour, will discuss with lead CHARLES and get that set up for them  Return to clinic 4 weeks    ANISH Swanson, CHARLES

## 2024-09-05 ENCOUNTER — ANCILLARY PROCEDURE (OUTPATIENT)
Dept: ULTRASOUND IMAGING | Facility: CLINIC | Age: 29
End: 2024-09-05
Attending: ADVANCED PRACTICE MIDWIFE
Payer: COMMERCIAL

## 2024-09-05 ENCOUNTER — PRENATAL OFFICE VISIT (OUTPATIENT)
Dept: MIDWIFE SERVICES | Facility: CLINIC | Age: 29
End: 2024-09-05
Attending: ADVANCED PRACTICE MIDWIFE
Payer: COMMERCIAL

## 2024-09-05 VITALS — WEIGHT: 179.6 LBS | SYSTOLIC BLOOD PRESSURE: 118 MMHG | BODY MASS INDEX: 33.94 KG/M2 | DIASTOLIC BLOOD PRESSURE: 64 MMHG

## 2024-09-05 DIAGNOSIS — Z36.89 ENCOUNTER FOR FETAL ANATOMIC SURVEY: ICD-10-CM

## 2024-09-05 DIAGNOSIS — Z34.02 ENCOUNTER FOR SUPERVISION OF LOW-RISK FIRST PREGNANCY IN SECOND TRIMESTER: Primary | ICD-10-CM

## 2024-09-05 PROCEDURE — 99207 PR PRENATAL VISIT: CPT | Performed by: ADVANCED PRACTICE MIDWIFE

## 2024-09-05 PROCEDURE — 76805 OB US >/= 14 WKS SNGL FETUS: CPT | Performed by: OBSTETRICS & GYNECOLOGY

## 2024-09-05 NOTE — RESULT ENCOUNTER NOTE
Results discussed directly with patient while patient was present. Any further details documented in the note.   ANISH Delaney CNM

## 2024-09-05 NOTE — PATIENT INSTRUCTIONS
Parviz link-message sent to our lead CNM to coordinate visit to hospital in person with birthplace manager    [email or web address]    -[facility] Midwife Team    Where to go:  Door 6 entry 8 am to 11 pm  Emergency room after 11 pm until 8 am  Labor and delivery 2nd floor off the elevators       Weeks 22 to 26 of Your Pregnancy: Care Instructions  Your baby's lungs are getting ready for breathing. Your baby may respond to your voice. Your baby likely turns less, and kicks or jerks more. Jerking may mean that your baby has hiccups.    Think about taking childbirth classes. And start to think about whether you want to have pain medicine during labor.    At your next doctor visit, you may be tested for anemia and for high blood sugar that first occurs during pregnancy (gestational diabetes). These conditions can cause problems for you and your baby.    To ease discomfort, such as back pain    Change your position often. Try not to sit or stand for too long.  Get some exercise. Things like walking or stretching may help.  Try using a heating pad or cold pack.    To ease or reduce swelling in your feet, ankles, hands, and fingers    Take off your rings.  Avoid high-sodium foods, such as potato chips.  Prop up your feet, and sleep with pillows under your feet.  Try to avoid standing for long periods of time.  Do not wear tight shoes.  Wear support stockings.  Kegel exercises to prevent urine from leaking    Squeeze your muscles as if you were trying not to pass gas. Your belly, legs, and buttocks shouldn't move. Hold the squeeze for 3 seconds, then relax for 5 to 10 seconds.    Add 1 second each week until you can squeeze for 10 seconds. Repeat the exercise 10 times a session. Do 3 to 8 sessions a day. If these exercises cause you pain, stop doing them and talk with your doctor.  Follow-up care is a key part of your treatment and safety. Be sure to make and  "go to all appointments, and call your doctor if you are having problems. It's also a good idea to know your test results and keep a list of the medicines you take.  Where can you learn more?  Go to https://www.ProcureSafe.net/patiented  Enter G264 in the search box to learn more about \"Weeks 22 to 26 of Your Pregnancy: Care Instructions.\"  Current as of: July 10, 2023               Content Version: 14.0    9535-6899 [employer].   Care instructions adapted under license by your healthcare professional. If you have questions about a medical condition or this instruction, always ask your healthcare professional. [employer] disclaims any warranty or liability for your use of this information.      Round Ligament Pain: Care Instructions  Overview     Round ligament pain is a common pain during pregnancy. You may feel a sharp brief pain on one or both sides of your belly. It may go down into your groin. It's usually felt for the first time during the second trimester. This pain is a normal part of pregnancy.  Your uterus is supported by two ligaments that go from the top and sides of the uterus to the bones of the pelvis. These are the round ligaments. As your uterus grows, these ligaments stretch and tighten with your movements. This may be the cause of the pain. You may find that certain activities seem to cause pain. If you can, avoid those activities.  Your doctor can usually diagnose round ligament pain from your symptoms and an exam. If you have bleeding or other symptoms, your doctor may also do an imaging test, such as an ultrasound. Your doctor may suggest some things that can help the pain, such as rest and strengthening exercises.  Follow-up care is a key part of your treatment and safety. Be sure to make and go to all appointments, and call your doctor if you are having problems. It's also a good idea to know your test results and keep a list of the medicines you take.  How can you " "care for yourself at home?  If certain movements seem to trigger belly pain, see if you can avoid them or try moving more slowly so the ligaments don't stretch quickly.  Stay active. If your doctor says it's okay, try moderate exercise. You might try things like swimming, walking, or stretching. Ask your doctor about strengthening and stretching exercises that may help.  Try a heating pad or cold pack on the area. A warm bath or shower may also help.  Rest when you can.  Ask your doctor about taking acetaminophen for pain. Be safe with medicines. Read and follow all instructions on the label.  Try a belly support band. Some people find that these can help.  When should you call for help?   Call your doctor now or seek immediate medical care if:    You think you might be in labor.     You have new or worse pain.   Watch closely for changes in your health, and be sure to contact your doctor if you have any problems.  Where can you learn more?  Go to https://www.Afrigator Internet.net/patiented  Enter R110 in the search box to learn more about \"Round Ligament Pain: Care Instructions.\"  Current as of: July 10, 2023  Content Version: 14.1    0357-6361 [employer].   Care instructions adapted under license by your healthcare professional. If you have questions about a medical condition or this instruction, always ask your healthcare professional. [employer] disclaims any warranty or liability for your use of this information.    Learning About Screening for Gestational Diabetes  What is gestational diabetes screening?     Screening for gestational diabetes is a way to look for high blood sugar during pregnancy. You drink some very sweet liquid. Then you have a blood test to see how your body uses sugar (glucose).  How is gestational diabetes screening done?  Screening for gestational diabetes may be done in a couple of ways.  Two-part screening.  Part one (glucose challenge test): A blood sample is " "taken after you drink a liquid that contains sugar (glucose). You don't need to stop eating or drinking before this test. If the test shows that you don't have a lot of sugar in your blood, you don't have gestational diabetes.  Part two (oral glucose tolerance test, or OGTT): If the first test shows a lot of sugar in your blood, then you may have an OGTT. You can't eat or drink for at least 8 hours before this test. A blood sample is taken, then you drink a sweet liquid. You have more blood tests after 1 to 3 hours. If the OGTT shows that you have a lot of sugar in your blood, you may have gestational diabetes.  One-part screening.  Sometimes doctors use the OGTT on its own. If the test shows that you don't have a lot of sugar in your blood, you don't have gestational diabetes. If you do have a lot of sugar in your blood, you may have the condition.  What are the risks of screening?  Your blood glucose level may drop very low toward the end of the test. If this happens, you may feel weak, hungry, and restless. Tell your doctor if you have these symptoms. The test usually will be stopped.  You may vomit after drinking the sweet liquid. If this happens, you may need to take the test at a later time.  Your doctor may do more glucose tests at other times during your pregnancy.  Follow-up care is a key part of your treatment and safety. Be sure to make and go to all appointments, and call your doctor if you are having problems. It's also a good idea to know your test results and keep a list of the medicines you take.  Where can you learn more?  Go to https://www.healthponUp.net/patiented  Enter A472 in the search box to learn more about \"Learning About Screening for Gestational Diabetes.\"  Current as of: October 2, 2023               Content Version: 14.0    7450-4273 [employer] Inc[employer].   Care instructions adapted under license by your healthcare professional. If you have questions about a medical condition or this " instruction, always ask your healthcare professional. Healthwise, Incorporated disclaims any warranty or liability for your use of this information.

## 2024-09-19 DIAGNOSIS — Z34.02 ENCOUNTER FOR SUPERVISION OF LOW-RISK FIRST PREGNANCY IN SECOND TRIMESTER: Primary | ICD-10-CM

## 2024-09-26 ENCOUNTER — PRENATAL OFFICE VISIT (OUTPATIENT)
Dept: MIDWIFE SERVICES | Facility: CLINIC | Age: 29
End: 2024-09-26
Payer: COMMERCIAL

## 2024-09-26 ENCOUNTER — LAB (OUTPATIENT)
Dept: LAB | Facility: CLINIC | Age: 29
End: 2024-09-26
Payer: COMMERCIAL

## 2024-09-26 VITALS — WEIGHT: 183 LBS | SYSTOLIC BLOOD PRESSURE: 128 MMHG | BODY MASS INDEX: 34.58 KG/M2 | DIASTOLIC BLOOD PRESSURE: 78 MMHG

## 2024-09-26 DIAGNOSIS — Z34.02 ENCOUNTER FOR SUPERVISION OF LOW-RISK FIRST PREGNANCY IN SECOND TRIMESTER: ICD-10-CM

## 2024-09-26 DIAGNOSIS — Z86.19 HISTORY OF HERPES GENITALIS: ICD-10-CM

## 2024-09-26 DIAGNOSIS — Z3A.24 24 WEEKS GESTATION OF PREGNANCY: ICD-10-CM

## 2024-09-26 DIAGNOSIS — Z34.02 ENCOUNTER FOR SUPERVISION OF LOW-RISK FIRST PREGNANCY IN SECOND TRIMESTER: Primary | ICD-10-CM

## 2024-09-26 LAB
ERYTHROCYTE [DISTWIDTH] IN BLOOD BY AUTOMATED COUNT: 12.8 % (ref 10–15)
GLUCOSE 1H P 50 G GLC PO SERPL-MCNC: 78 MG/DL (ref 70–129)
HCT VFR BLD AUTO: 35.7 % (ref 35–47)
HGB BLD-MCNC: 12.3 G/DL (ref 11.7–15.7)
MCH RBC QN AUTO: 29.9 PG (ref 26.5–33)
MCHC RBC AUTO-ENTMCNC: 34.5 G/DL (ref 31.5–36.5)
MCV RBC AUTO: 87 FL (ref 78–100)
PLATELET # BLD AUTO: 243 10E3/UL (ref 150–450)
RBC # BLD AUTO: 4.12 10E6/UL (ref 3.8–5.2)
WBC # BLD AUTO: 11.2 10E3/UL (ref 4–11)

## 2024-09-26 PROCEDURE — 82950 GLUCOSE TEST: CPT

## 2024-09-26 PROCEDURE — 99207 PR PRENATAL VISIT: CPT

## 2024-09-26 PROCEDURE — 36415 COLL VENOUS BLD VENIPUNCTURE: CPT

## 2024-09-26 PROCEDURE — 85027 COMPLETE CBC AUTOMATED: CPT

## 2024-09-26 NOTE — PATIENT INSTRUCTIONS
"Weeks 22 to 26 of Your Pregnancy: Care Instructions  Your baby's lungs are getting ready for breathing. Your baby may respond to your voice. Your baby likely turns less, and kicks or jerks more. Jerking may mean that your baby has hiccups.    Think about taking childbirth classes. And start to think about whether you want to have pain medicine during labor.   At your next doctor visit, you may be tested for anemia and for high blood sugar that first occurs during pregnancy (gestational diabetes). These conditions can cause problems for you and your baby.         To ease discomfort, such as back pain   Change your position often. Try not to sit or stand for too long.  Get some exercise. Things like walking or stretching may help.  Try using a heating pad or cold pack.        To ease or reduce swelling in your feet, ankles, hands, and fingers   Take off your rings.  Avoid high-sodium foods, such as potato chips.  Prop up your feet, and sleep with pillows under your feet.  Try to avoid standing for long periods of time.  Do not wear tight shoes.  Wear support stockings.  Kegel exercises to prevent urine from leaking    Squeeze your muscles as if you were trying not to pass gas. Your belly, legs, and buttocks shouldn't move. Hold the squeeze for 3 seconds, then relax for 5 to 10 seconds.    Add 1 second each week until you can squeeze for 10 seconds. Repeat the exercise 10 times a session. Do 3 to 8 sessions a day. If these exercises cause you pain, stop doing them and talk with your doctor.  Follow-up care is a key part of your treatment and safety. Be sure to make and go to all appointments, and call your doctor if you are having problems. It's also a good idea to know your test results and keep a list of the medicines you take.  Where can you learn more?  Go to https://www.healthwise.net/patiented  Enter G264 in the search box to learn more about \"Weeks 22 to 26 of Your Pregnancy: Care Instructions.\"  Current as of: " July 10, 2023  Content Version: 14.2 2024 [employer] [facility] [employer].   Care instructions adapted under license by your healthcare professional. If you have questions about a medical condition or this instruction, always ask your healthcare professional. Healthwise, Incorporated disclaims any warranty or liability for your use of this information.    Learning About Screening for Gestational Diabetes  What is gestational diabetes screening?     Screening for gestational diabetes is a way to look for high blood sugar during pregnancy. You drink some very sweet liquid. Then you have a blood test to see how your body uses sugar (glucose).  How is gestational diabetes screening done?  Screening for gestational diabetes may be done in a couple of ways.  Two-part screening.  Part one (glucose challenge test): A blood sample is taken after you drink a liquid that contains sugar (glucose). You don't need to stop eating or drinking before this test. If the test shows that you don't have a lot of sugar in your blood, you don't have gestational diabetes.  Part two (oral glucose tolerance test, or OGTT): If the first test shows a lot of sugar in your blood, then you may have an OGTT. You can't eat or drink for at least 8 hours before this test. A blood sample is taken, then you drink a sweet liquid. You have more blood tests after 1 to 3 hours. If the OGTT shows that you have a lot of sugar in your blood, you may have gestational diabetes.  One-part screening.  Sometimes doctors use the OGTT on its own. If the test shows that you don't have a lot of sugar in your blood, you don't have gestational diabetes. If you do have a lot of sugar in your blood, you may have the condition.  What are the risks of screening?  Your blood glucose level may drop very low toward the end of the test. If this happens, you may feel weak, hungry, and restless. Tell your doctor if you have these symptoms. The test usually will be stopped.  You may  "vomit after drinking the sweet liquid. If this happens, you may need to take the test at a later time.  Your doctor may do more glucose tests at other times during your pregnancy.  Follow-up care is a key part of your treatment and safety. Be sure to make and go to all appointments, and call your doctor if you are having problems. It's also a good idea to know your test results and keep a list of the medicines you take.  Where can you learn more?  Go to https://www.Brazzlebox.net/patiented  Enter A472 in the search box to learn more about \"Learning About Screening for Gestational Diabetes.\"  Current as of: October 2, 2023  Content Version: 14.2 2024 [employer].   Care instructions adapted under license by your healthcare professional. If you have questions about a medical condition or this instruction, always ask your healthcare professional. Healthwise, Incorporated disclaims any warranty or liability for your use of this information.    You have been provided the My Labor and Birth Wishes document.  Please review at home and bring to your next prenatal visit. Bring this sheet to the hospital for your birth. Give copies to your care team members and support person.   Additional copies can be found here:  [email or web address]    GESTATIONAL DIABETES SCREENING    All pregnant women are screened at least once for diabetes as part of their prenatal care. A woman has gestational diabetes if she has high blood sugars for the first time during pregnancy.    Diabetes can harm your health and the health of your baby.  But if we find the diabetes early in pregnancy we will watch your health closely and prevent further problems.     We will check for gestational diabetes during your visit between 24-28 weeks visit. Please note you can not do this prior to 24 weeks of gestation.    Plan to spend about an hour at the clinic.  When you check in let us know that you will be having your diabetes screening that " day.     We will give you a 50 gram glucose drink that you have 5 minutes to consume.  Exactly one hour later you will have draw blood from your arm to check your blood sugar level.    We will call you to let you know if your results are normal.  If the results are normal no more testing will be needed.  If your results are not normal we will discuss follow up testing with you.      You may eat prior to the testing but it is not recommended to eat or drink very sweet things such as pop, juice, candy or dessert type foods.  Eat a high protein, low carb meals prior to testing.    If you have any questions please call:     [facility]    [phone]

## 2024-09-26 NOTE — PROGRESS NOTES
24w5d    Feels well overall. Here with Ang.  Discussed the following:  - r/b/a of hospital vs home birth.   - Reviewed this CNM philosophy, practice guidelines.   - Planning to meet with home birth midwife.     Fetal movement: positive   Denies loss of fluid/vb/contractions, signs and symptoms preeclampsia  GCT next visit, handout provided, reminded of longer appointment    Tdap next visit; reviewed CDC recommendations and partner/family vaccination recommended as well  Water birth discussed, patient is interested  Need for Rhogam? No    Counseled on signs and symptoms of PTL, preeclampsia, discussed fetal movement awareness    Return to clinic 4 weeks    ANISH Schaefer, CNM

## 2024-10-09 ENCOUNTER — PRENATAL OFFICE VISIT (OUTPATIENT)
Dept: MIDWIFE SERVICES | Facility: CLINIC | Age: 29
End: 2024-10-09
Payer: COMMERCIAL

## 2024-10-09 ENCOUNTER — MYC MEDICAL ADVICE (OUTPATIENT)
Dept: MIDWIFE SERVICES | Facility: CLINIC | Age: 29
End: 2024-10-09

## 2024-10-09 VITALS — DIASTOLIC BLOOD PRESSURE: 70 MMHG | SYSTOLIC BLOOD PRESSURE: 132 MMHG | BODY MASS INDEX: 35.33 KG/M2 | WEIGHT: 187 LBS

## 2024-10-09 DIAGNOSIS — R30.0 DYSURIA: ICD-10-CM

## 2024-10-09 DIAGNOSIS — R30.0 DYSURIA: Primary | ICD-10-CM

## 2024-10-09 LAB
ALBUMIN UR-MCNC: NEGATIVE MG/DL
APPEARANCE UR: CLEAR
BILIRUB UR QL STRIP: NEGATIVE
COLOR UR AUTO: YELLOW
GLUCOSE UR STRIP-MCNC: NEGATIVE MG/DL
HGB UR QL STRIP: NEGATIVE
KETONES UR STRIP-MCNC: NEGATIVE MG/DL
LEUKOCYTE ESTERASE UR QL STRIP: NEGATIVE
NITRATE UR QL: NEGATIVE
PH UR STRIP: 6.5 [PH] (ref 5–7)
SP GR UR STRIP: 1.01 (ref 1–1.03)
UROBILINOGEN UR STRIP-ACNC: 0.2 E.U./DL

## 2024-10-09 PROCEDURE — 87086 URINE CULTURE/COLONY COUNT: CPT | Performed by: ADVANCED PRACTICE MIDWIFE

## 2024-10-09 PROCEDURE — 81003 URINALYSIS AUTO W/O SCOPE: CPT | Performed by: ADVANCED PRACTICE MIDWIFE

## 2024-10-09 PROCEDURE — 99213 OFFICE O/P EST LOW 20 MIN: CPT | Performed by: ADVANCED PRACTICE MIDWIFE

## 2024-10-09 PROCEDURE — 0352U MULTIPLEX VAGINAL PANEL BY PCR: CPT | Performed by: ADVANCED PRACTICE MIDWIFE

## 2024-10-09 NOTE — PATIENT INSTRUCTIONS
"\"I hope you had a positive experience and that you can definitely recommend [facility] to your family and friends. You ll be receiving a survey soon and I would look forward to hearing your feedback\".  "

## 2024-10-09 NOTE — TELEPHONE ENCOUNTER
"26w4d    Pt calling abdominal tightening - intermittent before but yesterday more consistent  After using BR last night, more severe for almost a minute at that time.  Almost occurring \"all the time\" especially when she stands  Does not feel uterine tightening like a lashell aguirre but occasional cramp like  Shooting type pain from belly buttons on down; short but frequent. Sometimes on one side. Does not sound in distress over the phone.  Riley LOF or VB  Active FM reported    Denies vaginal sx of discharge or odor; denies UTI sx however she said she isn't sure if this pain is r/t that or not  Has NOT done Tylenol, heat/ice or warm tub bath yet  Staying well hydrated and emptying bladder    Discussed musculoskeletal changes and sounds similar to what she is describing. Encouraged Tylenol 1000 mg once to see if it is helpful.    No midwife apts today - routing to midwife - do you want to see her for r/o UTI/urine? Shonna?    She isn't convinced w musculoskeletal being the answer.    Radha Guillen RN on 10/9/2024 at 11:24 AM    "

## 2024-10-09 NOTE — PROGRESS NOTES
26w4d gestation. Here today for eval for abdominal cramping.   C/o pain since yesterday morning. Woke up last night and had extreme lower abdominal cramping after using the restroom. Also noticing shooting pains in pelvis/abdomen when sitting up from bending over.   Negative vaginal bleeding  Positive FM  Positive pain with urination     Plan for self-collect MVP today and UA/UCX sent. Will treat if indicated    FHR with dop tone today is normal.     Keep next scheduled prenatal appt.     10 minutes spent by me on the date of the encounter doing chart review, patient visit, and documentation     Estelita OCONNELL CNM

## 2024-10-09 NOTE — TELEPHONE ENCOUNTER
She should be evaluated.  I would recommend that she come in for UA/UC and vaginitis screening as well as evaluation by CNM.  OK to double book at 2pm with in clinic CNM.  Either myself or the in clinic CNM will see here.      If pain increases, becomes constant/more painful or has any other concerning symptoms could go to [facility] for evaluation sooner.      Please offer her the 2pm appt and let me/us know if she plans to come in.     Thanks!    Shonna OCONNELL CNM, [facility]  [phone]

## 2024-10-09 NOTE — TELEPHONE ENCOUNTER
Called and scheduled pt at 1400 today - double booked on [facility] schedule per analisa Kilpatrick    Pt verbalized understanding, in agreement with plan, and voiced no further questions.    Radha Guillen RN on 10/9/2024 at 12:04 PM

## 2024-10-10 LAB
BACTERIA UR CULT: NO GROWTH
BACTERIAL VAGINOSIS VAG-IMP: NEGATIVE
CANDIDA DNA VAG QL NAA+PROBE: NOT DETECTED
CANDIDA GLABRATA / CANDIDA KRUSEI DNA: NOT DETECTED
T VAGINALIS DNA VAG QL NAA+PROBE: NOT DETECTED

## 2024-10-21 ENCOUNTER — TELEPHONE (OUTPATIENT)
Dept: MIDWIFE SERVICES | Facility: CLINIC | Age: 29
End: 2024-10-21
Payer: COMMERCIAL

## 2024-10-21 NOTE — TELEPHONE ENCOUNTER
28w2d    Last /70 on 10/9/24  Previous Bps 110s-120s/60s-70s on record      Called and spoke to patient    Pt noticed the swelling over the past two weeks - had mild to moderate swelling  Noticed in feet and her wrists felt tight    Over last few days - she noticed more swelling in her face and increased swelling in legs/feet    Denies any other symptoms - no severe headaches/vision changes/shortness of breath, RUQ pain.    Pt believes she has BP cuff at home, but is unsure how to use it     Offered pt nurse BP check today for reassurance due to more increased swelling, but no other symptoms    Pt wondering is she can wait until her appt Thursday for BP check since she has no other symptoms     Next appt 10/24 with Angélica [facility]     Routing to provider -     Ok for pt to wait until Thursday to be seen at upcoming visit or would you recommend sooner nurse BP check?     Miracle Thornton RN on 10/21/2024 at 10:27 AM  [facility] Triage

## 2024-10-21 NOTE — TELEPHONE ENCOUNTER
Response from Jyotsna Baltazar CNM:    Since there are no other symptoms, then I am fine with her to wait until her next appointment.     Called and spoke to pt, let pt know she can wait until Thursday to be seen as long as she has no other symptoms    Pt to notify clinic if worsening swelling or any preeclampsia s/s  PreE symptoms reviewed with pt. Pt verbalizes understanding and reports no further questions at this time    Miracle Thornton RN on 10/21/2024 at 10:40 AM  [facility] Triage

## 2024-10-21 NOTE — TELEPHONE ENCOUNTER
[facility] Call [facility]    Phone Message    May a detailed message be left on voicemail: yes     Reason for Call: Symptoms or Concerns     If patient has red-flag symptoms, warm transfer to triage line    Current symptom or concern: swelling in feet, calves, hands/wrists, face    Symptoms have been present for:  1 week(s)    Has patient previously been seen for this? No        Action Taken: Other: we midwife     Travel Screening: Not Applicable     Date of Service:

## 2024-10-24 ENCOUNTER — PRENATAL OFFICE VISIT (OUTPATIENT)
Dept: MIDWIFE SERVICES | Facility: CLINIC | Age: 29
End: 2024-10-24
Payer: COMMERCIAL

## 2024-10-24 VITALS — BODY MASS INDEX: 36.28 KG/M2 | SYSTOLIC BLOOD PRESSURE: 144 MMHG | DIASTOLIC BLOOD PRESSURE: 92 MMHG | WEIGHT: 192 LBS

## 2024-10-24 DIAGNOSIS — Z34.03 ENCOUNTER FOR SUPERVISION OF LOW-RISK FIRST PREGNANCY IN THIRD TRIMESTER: Primary | ICD-10-CM

## 2024-10-24 DIAGNOSIS — Z23 NEED FOR VACCINATION: ICD-10-CM

## 2024-10-24 DIAGNOSIS — R03.0 ELEVATED BP WITHOUT DIAGNOSIS OF HYPERTENSION: ICD-10-CM

## 2024-10-24 LAB
ERYTHROCYTE [DISTWIDTH] IN BLOOD BY AUTOMATED COUNT: 12.2 % (ref 10–15)
HCT VFR BLD AUTO: 37.9 % (ref 35–47)
HGB BLD-MCNC: 12.9 G/DL (ref 11.7–15.7)
MCH RBC QN AUTO: 29.7 PG (ref 26.5–33)
MCHC RBC AUTO-ENTMCNC: 34 G/DL (ref 31.5–36.5)
MCV RBC AUTO: 87 FL (ref 78–100)
PLATELET # BLD AUTO: 246 10E3/UL (ref 150–450)
RBC # BLD AUTO: 4.34 10E6/UL (ref 3.8–5.2)
WBC # BLD AUTO: 11.5 10E3/UL (ref 4–11)

## 2024-10-24 PROCEDURE — 84450 TRANSFERASE (AST) (SGOT): CPT | Performed by: ADVANCED PRACTICE MIDWIFE

## 2024-10-24 PROCEDURE — 84156 ASSAY OF PROTEIN URINE: CPT | Performed by: ADVANCED PRACTICE MIDWIFE

## 2024-10-24 PROCEDURE — 85027 COMPLETE CBC AUTOMATED: CPT | Performed by: ADVANCED PRACTICE MIDWIFE

## 2024-10-24 PROCEDURE — 90471 IMMUNIZATION ADMIN: CPT | Performed by: ADVANCED PRACTICE MIDWIFE

## 2024-10-24 PROCEDURE — 84460 ALANINE AMINO (ALT) (SGPT): CPT | Performed by: ADVANCED PRACTICE MIDWIFE

## 2024-10-24 PROCEDURE — 90715 TDAP VACCINE 7 YRS/> IM: CPT | Performed by: ADVANCED PRACTICE MIDWIFE

## 2024-10-24 PROCEDURE — 99207 PR PRENATAL VISIT: CPT | Performed by: ADVANCED PRACTICE MIDWIFE

## 2024-10-24 PROCEDURE — 36415 COLL VENOUS BLD VENIPUNCTURE: CPT | Performed by: ADVANCED PRACTICE MIDWIFE

## 2024-10-24 NOTE — PROGRESS NOTES
28w5d  Feels well overall. BP elevated in clinic today 144/92. Early this week noticed full body swelling, but two days prior had a full body deep tissue massage. Swelling has since resolved some. Still has swelling in lower extremities.   Pre-e labs today  Has a BP cuff at home. Discussed checking BP twice a day. Bring cuff with her to next prenatal visit to calibrate  Fetal movement: positive, denies loss of fluid/vb several lashell aguirre contractions, Denies any other signs and symptoms preeclampsia    Tdap today: Yes    Reviewed PTL precautions, S&S of preeclampsia and fetal movement awareness, patient verbalizes understanding and what to report    Return to clinic 2 weeks    Estelita OCONNELL CNM

## 2024-10-25 ENCOUNTER — APPOINTMENT (OUTPATIENT)
Dept: ULTRASOUND IMAGING | Facility: CLINIC | Age: 29
End: 2024-10-25
Attending: ADVANCED PRACTICE MIDWIFE
Payer: COMMERCIAL

## 2024-10-25 ENCOUNTER — HOSPITAL ENCOUNTER (OUTPATIENT)
Facility: CLINIC | Age: 29
Discharge: HOME | End: 2024-10-25
Attending: ADVANCED PRACTICE MIDWIFE | Admitting: ADVANCED PRACTICE MIDWIFE
Payer: COMMERCIAL

## 2024-10-25 VITALS
TEMPERATURE: 98.1 F | HEART RATE: 57 BPM | RESPIRATION RATE: 16 BRPM | HEIGHT: 60 IN | WEIGHT: 192 LBS | DIASTOLIC BLOOD PRESSURE: 67 MMHG | SYSTOLIC BLOOD PRESSURE: 139 MMHG | BODY MASS INDEX: 37.69 KG/M2

## 2024-10-25 DIAGNOSIS — O09.93 SUPERVISION OF HIGH RISK PREGNANCY IN THIRD TRIMESTER: ICD-10-CM

## 2024-10-25 DIAGNOSIS — O13.3 GESTATIONAL HTN, THIRD TRIMESTER: ICD-10-CM

## 2024-10-25 DIAGNOSIS — Z34.03 ENCOUNTER FOR SUPERVISION OF LOW-RISK FIRST PREGNANCY IN THIRD TRIMESTER: Primary | ICD-10-CM

## 2024-10-25 DIAGNOSIS — R74.8 ELEVATED LIVER ENZYMES: ICD-10-CM

## 2024-10-25 LAB
ALBUMIN MFR UR ELPH: <6 MG/DL
ALBUMIN UR-MCNC: NEGATIVE MG/DL
ALT SERPL W P-5'-P-CCNC: 80 U/L (ref 0–50)
APPEARANCE UR: CLEAR
AST SERPL W P-5'-P-CCNC: 50 U/L (ref 0–45)
BILIRUB UR QL STRIP: NEGATIVE
COLOR UR AUTO: ABNORMAL
CREAT UR-MCNC: 32.1 MG/DL
FASTING STATUS PATIENT QL REPORTED: NO
GLUCOSE SERPL-MCNC: 73 MG/DL (ref 70–99)
GLUCOSE UR STRIP-MCNC: NEGATIVE MG/DL
HGB UR QL STRIP: NEGATIVE
KETONES UR STRIP-MCNC: 20 MG/DL
LEUKOCYTE ESTERASE UR QL STRIP: NEGATIVE
LIPASE SERPL-CCNC: 40 U/L (ref 13–60)
MUCOUS THREADS #/AREA URNS LPF: PRESENT /LPF
NITRATE UR QL: NEGATIVE
PH UR STRIP: 6 [PH] (ref 5–7)
PROT/CREAT 24H UR: NORMAL MG/G{CREAT}
RBC URINE: 0 /HPF
SP GR UR STRIP: 1.02 (ref 1–1.03)
UROBILINOGEN UR STRIP-MCNC: NORMAL MG/DL
WBC URINE: 1 /HPF

## 2024-10-25 PROCEDURE — 81003 URINALYSIS AUTO W/O SCOPE: CPT | Performed by: ADVANCED PRACTICE MIDWIFE

## 2024-10-25 PROCEDURE — 99417 PROLNG OP E/M EACH 15 MIN: CPT | Performed by: ADVANCED PRACTICE MIDWIFE

## 2024-10-25 PROCEDURE — 36415 COLL VENOUS BLD VENIPUNCTURE: CPT | Performed by: ADVANCED PRACTICE MIDWIFE

## 2024-10-25 PROCEDURE — 76705 ECHO EXAM OF ABDOMEN: CPT

## 2024-10-25 PROCEDURE — 83690 ASSAY OF LIPASE: CPT | Performed by: OBSTETRICS & GYNECOLOGY

## 2024-10-25 PROCEDURE — 76816 OB US FOLLOW-UP PER FETUS: CPT

## 2024-10-25 PROCEDURE — 99215 OFFICE O/P EST HI 40 MIN: CPT | Performed by: ADVANCED PRACTICE MIDWIFE

## 2024-10-25 PROCEDURE — G0463 HOSPITAL OUTPT CLINIC VISIT: HCPCS

## 2024-10-25 PROCEDURE — 82947 ASSAY GLUCOSE BLOOD QUANT: CPT | Performed by: ADVANCED PRACTICE MIDWIFE

## 2024-10-25 PROCEDURE — 250N000013 HC RX MED GY IP 250 OP 250 PS 637: Performed by: ADVANCED PRACTICE MIDWIFE

## 2024-10-25 RX ORDER — OXYTOCIN 10 [USP'U]/ML
10 INJECTION, SOLUTION INTRAMUSCULAR; INTRAVENOUS
Status: CANCELLED | OUTPATIENT
Start: 2024-10-25

## 2024-10-25 RX ORDER — SODIUM CHLORIDE, SODIUM LACTATE, POTASSIUM CHLORIDE, CALCIUM CHLORIDE 600; 310; 30; 20 MG/100ML; MG/100ML; MG/100ML; MG/100ML
INJECTION, SOLUTION INTRAVENOUS CONTINUOUS
Status: DISCONTINUED | OUTPATIENT
Start: 2024-10-25 | End: 2024-10-25

## 2024-10-25 RX ORDER — ACETAMINOPHEN 325 MG/1
975 TABLET ORAL ONCE
Status: DISCONTINUED | OUTPATIENT
Start: 2024-10-25 | End: 2024-10-25

## 2024-10-25 RX ORDER — AZITHROMYCIN 500 MG/1
500 INJECTION, POWDER, LYOPHILIZED, FOR SOLUTION INTRAVENOUS
Status: DISCONTINUED | OUTPATIENT
Start: 2024-10-25 | End: 2024-10-25

## 2024-10-25 RX ORDER — CARBOPROST TROMETHAMINE 250 UG/ML
250 INJECTION, SOLUTION INTRAMUSCULAR
Status: DISCONTINUED | OUTPATIENT
Start: 2024-10-25 | End: 2024-10-25

## 2024-10-25 RX ORDER — MISOPROSTOL 200 UG/1
400 TABLET ORAL
Status: DISCONTINUED | OUTPATIENT
Start: 2024-10-25 | End: 2024-10-25

## 2024-10-25 RX ORDER — NIFEDIPINE 30 MG/1
30 TABLET, EXTENDED RELEASE ORAL DAILY
Qty: 60 TABLET | Refills: 0 | Status: SHIPPED | OUTPATIENT
Start: 2024-10-25 | End: 2024-10-30

## 2024-10-25 RX ORDER — OXYTOCIN 10 [USP'U]/ML
10 INJECTION, SOLUTION INTRAMUSCULAR; INTRAVENOUS
Status: DISCONTINUED | OUTPATIENT
Start: 2024-10-25 | End: 2024-10-25

## 2024-10-25 RX ORDER — CITRIC ACID/SODIUM CITRATE 334-500MG
30 SOLUTION, ORAL ORAL
Status: DISCONTINUED | OUTPATIENT
Start: 2024-10-25 | End: 2024-10-25

## 2024-10-25 RX ORDER — TRANEXAMIC ACID 10 MG/ML
1 INJECTION, SOLUTION INTRAVENOUS EVERY 30 MIN PRN
Status: DISCONTINUED | OUTPATIENT
Start: 2024-10-25 | End: 2024-10-25

## 2024-10-25 RX ORDER — OXYTOCIN/0.9 % SODIUM CHLORIDE 30/500 ML
100-340 PLASTIC BAG, INJECTION (ML) INTRAVENOUS CONTINUOUS PRN
Status: CANCELLED | OUTPATIENT
Start: 2024-10-25

## 2024-10-25 RX ORDER — METHYLERGONOVINE MALEATE 0.2 MG/ML
200 INJECTION INTRAVENOUS
Status: DISCONTINUED | OUTPATIENT
Start: 2024-10-25 | End: 2024-10-25

## 2024-10-25 RX ORDER — CEFAZOLIN SODIUM/WATER 2 G/20 ML
2 SYRINGE (ML) INTRAVENOUS
Status: DISCONTINUED | OUTPATIENT
Start: 2024-10-25 | End: 2024-10-25

## 2024-10-25 RX ORDER — OXYTOCIN/0.9 % SODIUM CHLORIDE 30/500 ML
340 PLASTIC BAG, INJECTION (ML) INTRAVENOUS CONTINUOUS PRN
Status: DISCONTINUED | OUTPATIENT
Start: 2024-10-25 | End: 2024-10-25

## 2024-10-25 RX ORDER — MISOPROSTOL 200 UG/1
800 TABLET ORAL
Status: DISCONTINUED | OUTPATIENT
Start: 2024-10-25 | End: 2024-10-25

## 2024-10-25 RX ORDER — LOPERAMIDE HYDROCHLORIDE 2 MG/1
2 CAPSULE ORAL
Status: DISCONTINUED | OUTPATIENT
Start: 2024-10-25 | End: 2024-10-25

## 2024-10-25 RX ORDER — CEFAZOLIN SODIUM/WATER 2 G/20 ML
2 SYRINGE (ML) INTRAVENOUS SEE ADMIN INSTRUCTIONS
Status: DISCONTINUED | OUTPATIENT
Start: 2024-10-25 | End: 2024-10-25

## 2024-10-25 RX ORDER — LIDOCAINE 40 MG/G
CREAM TOPICAL
Status: DISCONTINUED | OUTPATIENT
Start: 2024-10-25 | End: 2024-10-25

## 2024-10-25 RX ORDER — LOPERAMIDE HYDROCHLORIDE 2 MG/1
4 CAPSULE ORAL
Status: DISCONTINUED | OUTPATIENT
Start: 2024-10-25 | End: 2024-10-25

## 2024-10-25 RX ORDER — NIFEDIPINE 30 MG/1
30 TABLET, EXTENDED RELEASE ORAL DAILY
Status: DISCONTINUED | OUTPATIENT
Start: 2024-10-25 | End: 2024-10-26 | Stop reason: HOSPADM

## 2024-10-25 RX ADMIN — NIFEDIPINE 30 MG: 30 TABLET, FILM COATED, EXTENDED RELEASE ORAL at 21:43

## 2024-10-25 ASSESSMENT — ACTIVITIES OF DAILY LIVING (ADL)
ADLS_ACUITY_SCORE: 0

## 2024-10-25 NOTE — PROGRESS NOTES
Pt arrived to triage with c/o elevated BP while at physical therapy this afternoon. 28+6. Pt with mild generalized swelling. Pt states the swelling was much worse earlier this week following a deep tissue massage. Monitoring BP frequently, every 15min. BPs elevated, see flowsheet for details, not severe range. Denies headache, vision changes, epigastric pain. AST, ALT elevated yesterday in clinic, but also in 2014. TANGELA Harrington CNM in department and at bedside to discuss plan of care with pt and spouse. FHTs category 1. Rare uterine contractions noted on toco, pt reports feeling intermittent cramping. Ultrasound ordered. Nifedipine recommended, but pt refusing at this time. Serum glucose 73. Awaiting imaging. CNM in department. Cont to monitor and assess.

## 2024-10-25 NOTE — PROGRESS NOTES
[facility] CNM TRIAGE NOTE    Vanessa Turcios is a 29 year old  with and IUP at 28w6d who presents for extended fetal monitoring due to abnormal AST/ALT results (done yesterday) and having increased blood pressures today at her PT appt in addition to one elevated BP at her routine clinic visit yesterday. Blood pressure in clinic yesterday was 144/92. Blood pressures at her PT appt ranged from 140s-150s/70s-90   She denies any other s/symptoms of pre-eclampsia. No headache, vision changes or epigastric pain.   At her prenatal appt yesterday Vanessa mentioned she experienced full body swelling, which slowly got better over the week. She did mention she had a deep tissue massage prior to noticing her full body swelling.  Her lower extremities are still edematous today. 2+ pitting edema  After reviewing her chart it is noted her AST/ALT were elevated 10 yrs ago, also. She was evaluated in the ED for epigastric pain 2014. See note for full detail. Vanessa states she did not follow up with her PCP or Pediatrician on her elevated liver enzymes, which was suggested at that time. Vanessa states she was never told at any further annual exams she had elevated liver enzymes or issues with her blood pressure.     Patient states baby is active.  Denies ROM   Denies vaginal bleeding  [facility].     Problems this pregnancy:   1.   Patient Active Problem List   Diagnosis    Genital herpes    Supervision of high risk pregnancy in third trimester    Elevated liver enzymes    Gestational HTN, third trimester     ROS:  Patient is alert and oriented    PHYSICAL EXAM:  BP (!) 141/86   Pulse 64   Temp 98.2  F (36.8  C) (Temporal)   Ht 1.524 m (5')   Wt 87.1 kg (192 lb)   LMP 04/10/2024   BMI 37.50 kg/m      Fetal heart tones: Baseline 140   Variability: Moderate  Accelerations: Present  Decelerations: Absent    Contractions: Initially when Vanessa presented to the  "MAC her contractions were occasional. After she had been here in [facility] for few hours she reports beginning to feel more cramping in her lower abdomen.     Abdomen: Gravid  Bloody show: No  Cervix: closed/thick/high (exam done by CNM at )  Membranes are intact       ASSESSMENT :   29 year old  with pineda IUP 28w6d not in labor    EFM: Category 1    GBS unknown and membranes intact  Elevated AST/ALT, other pre-eclamptic lab results are WNL    Increased blood pressures meeting criteria for gestational HTN, with suspicion for undiagnosed chronic hypertension     Liver/RUQ US result NORMAL    Growth US normal EFW 1244 gms, 45%, SDP 5.7cm    PLAN:  Discussed patient status with Dr. Blackman upon Vanessa presenting to the [facility]. Dr. Blackman agrees with the need to being an anti-hypertensive medication, growth ultrasounds,  screening beginning at 32 weeks and a [facility] referral. In addition Dr. Blackman recommends extended monitoring of blood pressures while in MAC, liver/RUQ US, and also a referral for a GI consult.    Lengthy conversation with Vanessa and Ang on the risks of hypertensive disorders of pregnancy (gestational htn,  pre-eclampsia, chronic hypertension) Risks discussed included, but were not limited to PTL, PTB, FGR, future risk of gestational hypertensive disorders of pregnancy, future risks of CVD, stroke and seizure risk with uncontrolled high blood pressure.   I discussed ACOG's recommendation for timing of delivery with gestational hypertensive disorders of pregnancy may be between 34-37 wks gestation.     Initiate Nifedipine XL 30 mg daily, first dose while in MAC.   Discussed medications used in pregnancy for hypertension. Vanessa declines this medication and states she usually does not take medications and she feels since her blood pressure is not severe (\"not over 150s\") she would like to wait on taking Nifedipine for a few days and reevalaute. Lengthy discussion on risk of BP increasing without " warning and risk of seizure or stroke with untreated high blood pressure. I strongly advised her to reconsider. I reviewed hypertensive parameters in the general non-pregnant population would indicate medication treatment, also.   Vanessa and Ang discussed further during her MAC stay and agreed on initiating Nifedipine. She agreed to begin her first dose prior to discharge home.     Prescription sent to pharmacy on file for Nifedipine XL 30 mg daily. Education sheet provided as well as discussing side effects to watch for. She is aware this medication may need to be increased or decreased depending on her BP readings at home and when in clinic. I also discussed she may need an additional medication if Nifedipine provides insufficient control.     Monitor BP at home twice a day.   Vanessa has a cuff at home. Parameters discussed with her and when to call. She has been advised to keep a log of her BPs. Bring cuff to next clinic appt so it can be calibrated. Education sheet provided in AVS on how to accurately take a home BP, parameters, and s/symptoms of hypertension in pregnancy to call with.     Growth US with LATANYA  Reviewed risks of fetal growth restriction with hypertensive disorders of pregnancy. Growth US will likely need to be done ever 3-4 weeks for remainder of pregnancy. First growth US today while in MAC     Liver/RUQ US:   Ordered for evaluation for elevated LFTs/hx of increased LFTs and eval for liver abnormality. To be done while here in [facility]    MFM consult ASAP  Discussed reasoning for MFM referral due to new high risk pregnancy status and hx of elevated liver enzymes and GHTN, with suspicions of underlying chronic HTN. Guidance and recommendations for  screening  for remainder of pregnancy will be determined with MFM. This will likely include once or twice weekly prenatal visits, weekly labs and weekly  screening. NSTs and or BPPs will likely begin at 32 weeks gestation.     Outpt GI consult    Due to hx of/and current elevation of liver enzymes. Suspected chronic HTN or other liver abnormality.  This was ordered along with her MFM consult.     Return to clinic Monday for repeat pre-e labs and BP check.  Vanessa aware she will receive a call from schedulers Monday to set up appts. Message sent to schedulers asking to call pt. Monday morning.     Discontinue NettleTea and all other herbal supplements. Discussed safety in pregnancy is unknown and has not been studied in depth.    Discontinue deep tissue massage  May be a contributor to generalized body swelling.    Discharge home. Advised to rest and limit activity over the weekend    Transfer to MD care for remainder of pregnancy due to increased frequency of prenatal appts needed and now being on Nifedipine and needing close  screening. This was not discussed with Vanessa while in MAC. Plan for CNM to call  to discuss this recommendation. I will plan for Vanessa to continue care with midwife group until she can be scheduled with an MD.     Routine discharge teaching done r/t to s/s of labor, SROM, decreased fetal movement, comfort measures in third trimester.  Instructed to please refer to the discharge handouts, the RN triage line or on-call CNM for any questions or concerns.  Pt verbalizes understanding and agreement with current plan of care.    ANISH Nolen CNM    180 minutes spent by me on the date of the encounter doing chart review, review of outside records, review of test results, interpretation of tests, patient visit, documentation, discussion with other provider(s), and discussion with family

## 2024-10-26 NOTE — DISCHARGE INSTRUCTIONS
Learning About When to Call Your Doctor During Pregnancy (After 20 Weeks)  Overview  It's common to have concerns about what might be a problem when you're pregnant. Most pregnancies don't have any serious problems. But it's still important to know when to call your doctor if you have certain symptoms or signs of labor.  These are general suggestions. Your doctor may give you some more information about when to call.  When to call your doctor (after 20 weeks)  Call 911  anytime you think you may need emergency care. For example, call if:  You have severe vaginal bleeding. This means you are soaking through a pad each hour for 2 or more hours.  You have sudden, severe pain in your belly.  You have chest pain, are short of breath, or cough up blood.  You passed out (lost consciousness).  You have a seizure.  You see or feel the umbilical cord.  You think you are about to deliver your baby and can't make it safely to the hospital or birthing center.  Call your doctor now or seek immediate medical care if:  You have vaginal bleeding.  You have belly pain.  You have a fever.  You are dizzy or lightheaded, or you feel like you may faint.  You have signs of a blood clot in your leg (called a deep vein thrombosis), such as:  Pain in the calf, back of the knee, thigh, or groin.  Swelling in your leg or groin.  A color change on the leg or groin. The skin may be reddish or purplish, depending on your usual skin color.  You have symptoms of preeclampsia, such as:  Sudden swelling of your face, hands, or feet.  New vision problems (such as dimness, blurring, or seeing spots).  A severe headache.  You have a sudden release of fluid from your vagina. (You think your water broke.)  You've been having regular contractions for an hour. This means that you've had at least 6 contractions within 1 hour, even after you change your position and drink fluids.  You notice that your baby has stopped moving or is moving less than  "normal.  You have signs of heart failure, such as:  New or increased shortness of breath.  New or worse swelling in your legs, ankles, or feet.  Sudden weight gain, such as more than 2 to 3 pounds in a day or 5 pounds in a week.  Feeling so tired or weak that you cannot do your usual activities.  You have symptoms of a urinary tract infection. These may include:  Pain or burning when you urinate.  A frequent need to urinate without being able to pass much urine.  Pain in the flank, which is just below the rib cage and above the waist on either side of the back.  Blood in your urine.  Watch closely for changes in your health, and be sure to contact your doctor if:  You have vaginal discharge that smells bad.  You feel sad, anxious, or hopeless for more than a few days.  You have skin changes, such as a rash, itching, or a yellow color to your skin.  You have other concerns about your pregnancy.  If you have labor signs at 37 weeks or more  If you have signs of labor at 37 weeks or more, your doctor may tell you to call when your labor becomes more active. Symptoms of active labor include:  Contractions that are regular.  Contractions that are less than 5 minutes apart.  Contractions that are hard to talk through.  Follow-up care is a key part of your treatment and safety. Be sure to make and go to all appointments, and call your doctor if you are having problems. It's also a good idea to know your test results and keep a list of the medicines you take.  Where can you learn more?  Go to https://www.[email or web address].net/patiented  Enter N531 in the search box to learn more about \"Learning About When to Call Your Doctor During Pregnancy (After 20 Weeks).\"  Current as of: July 10, 2023  Content Version: 14.2 2024 [facility] [employer].   Care instructions adapted under license by your healthcare professional. If you have questions about a medical condition or this instruction, always ask your healthcare professional. " [employer] disclaims any warranty or liability for your use of this information.

## 2024-10-26 NOTE — PROGRESS NOTES
Discharge instructions given, patient verbalizes understanding. Pt ambulatory, discharged to home in stable condition.

## 2024-10-27 ENCOUNTER — TELEPHONE (OUTPATIENT)
Dept: NURSING | Facility: CLINIC | Age: 29
End: 2024-10-27
Payer: COMMERCIAL

## 2024-10-27 ENCOUNTER — TELEPHONE (OUTPATIENT)
Dept: MIDWIFE SERVICES | Facility: CLINIC | Age: 29
End: 2024-10-27
Payer: COMMERCIAL

## 2024-10-27 NOTE — TELEPHONE ENCOUNTER
Call back to patient after CNM spoke with FNA. RN reports Vanessa calls with an elevated BP today   She also had an elevated BP last night.  Vanessa was seen in the MAC on Friday for increased BPs and abnormal LFTs. She was discharged home with Nifedipine XL 30 mg daily. See notes for full detail.     When speaking with Vanessa she reports she took her BP approx 30 min ago and it was 144/82  Yesterday she took her BP three times. Her first BP was 132/90. Later in the day her BP after going up the stairs was 156/90. When she retook her BP after eating dinner and resting her BP was 137/79    Vanessa denies any vision changes, epigastric  pain or RUQ pain. She is having lashell aguirre but no regular abdominal cramping. Positive FM.     She plans to take her medication tonight again around 1900  Plan made to take BP prior to taking her medication . If elevated she needs to call, medication may need to be increased. If BP this evening is normal, continue with Nifedipine Xl 30 mg.   Take BP again in AM. If BP elevated advised to call.   Plan is still for schedulers to call her in the AM to set an appt for Monday for a BP check and repeat pre-e labs.   In addition to discussing her current BP I also talked with her and Ang about the need to transfer to MD care due to her high risk status and needing blood pressure medications. I assured both of them they can still have a  during labor and Vanessa will still be able to move around and change positions when in labor. She will still be able to push in various positions in 2nd stage, too. This is a significant concern for both of them and a reason why they chose midwifery care. Both understand the transfer to MD care is for safety of both mom and baby.     Estelita OCONNELL, CHARLES

## 2024-10-27 NOTE — TELEPHONE ENCOUNTER
Telephone call  Patient calling she was in the hospital  for high blood pressure she was started on a blood pressure medication and told to call if her blood pressure was over 140/90.    Her pressure 132/90 yesterday morning  then last night when she got home from work it was 156/90 it was about   6 pm and  then she  took her medication and ate dinner then she rechecked her pressure about 8:30 Pm and it was 137/79.  Today it was 144/82 at about 12:30. Paged Midwife on call TANGELA Harrington and she will call the patient back and decide if she needs to increase the dose of medication.    Winnie Koch RN   [facility] Nurse Advisor  12:50 PM 10/27/2024

## 2024-10-28 ENCOUNTER — MEDICAL CORRESPONDENCE (OUTPATIENT)
Dept: HEALTH INFORMATION MANAGEMENT | Facility: CLINIC | Age: 29
End: 2024-10-28

## 2024-10-28 ENCOUNTER — ALLIED HEALTH/NURSE VISIT (OUTPATIENT)
Dept: OBGYN | Facility: CLINIC | Age: 29
End: 2024-10-28
Payer: COMMERCIAL

## 2024-10-28 VITALS — DIASTOLIC BLOOD PRESSURE: 83 MMHG | HEART RATE: 54 BPM | SYSTOLIC BLOOD PRESSURE: 128 MMHG

## 2024-10-28 DIAGNOSIS — O13.3 GESTATIONAL HTN, THIRD TRIMESTER: ICD-10-CM

## 2024-10-28 DIAGNOSIS — O09.93 SUPERVISION OF HIGH RISK PREGNANCY IN THIRD TRIMESTER: Primary | ICD-10-CM

## 2024-10-28 DIAGNOSIS — O13.3 GESTATIONAL HYPERTENSION, THIRD TRIMESTER: ICD-10-CM

## 2024-10-28 LAB
ALBUMIN MFR UR ELPH: 6.5 MG/DL
ALT SERPL W P-5'-P-CCNC: 64 U/L (ref 0–50)
AST SERPL W P-5'-P-CCNC: 40 U/L (ref 0–45)
CREAT UR-MCNC: 43.2 MG/DL
ERYTHROCYTE [DISTWIDTH] IN BLOOD BY AUTOMATED COUNT: 12.4 % (ref 10–15)
HCT VFR BLD AUTO: 37.2 % (ref 35–47)
HGB BLD-MCNC: 12.4 G/DL (ref 11.7–15.7)
MCH RBC QN AUTO: 29.5 PG (ref 26.5–33)
MCHC RBC AUTO-ENTMCNC: 33.3 G/DL (ref 31.5–36.5)
MCV RBC AUTO: 89 FL (ref 78–100)
PLATELET # BLD AUTO: 248 10E3/UL (ref 150–450)
PROT/CREAT 24H UR: 0.15 MG/MG CR (ref 0–0.2)
RBC # BLD AUTO: 4.2 10E6/UL (ref 3.8–5.2)
WBC # BLD AUTO: 9.4 10E3/UL (ref 4–11)

## 2024-10-28 PROCEDURE — 84450 TRANSFERASE (AST) (SGOT): CPT

## 2024-10-28 PROCEDURE — 85027 COMPLETE CBC AUTOMATED: CPT

## 2024-10-28 PROCEDURE — 84156 ASSAY OF PROTEIN URINE: CPT

## 2024-10-28 PROCEDURE — 99207 PR NO CHARGE NURSE ONLY: CPT

## 2024-10-28 PROCEDURE — 36415 COLL VENOUS BLD VENIPUNCTURE: CPT

## 2024-10-28 PROCEDURE — 84460 ALANINE AMINO (ALT) (SGPT): CPT

## 2024-10-28 NOTE — RESULT ENCOUNTER NOTE
Informed via mychart, AST normal @40, ALT still elevated but improved from last check in MAC. Seen in clinic today for BP check and new cuff ordered, see RN note for further details. Keep visit 11/8 with ANISH Bach, CNM

## 2024-10-28 NOTE — PROGRESS NOTES
Patient has new diagnosis of gestational HTN and is on medication. History of elevated liver enzymes, unclear if some question of chronic HTN or new onset preclampsia given currently elevated ALT. MFM referral in place and will seek guidance on monitoring from them but she is not scheduled yet. Recommend monitoring at least weekly until she can meet with MFM and MD next week. Orders placed for both NST and BPP. Patient informed via mychart that scheduling will reach out to her.    AINSH Swanson, CNM

## 2024-10-28 NOTE — PROGRESS NOTES
Pt here for BP check and repeat labs - was in MAC on Friday for elevated BP and was started on Procardia 30mg XL. Currently 29w2d    Reports active fetal movement    Pt brought BP readings from yesterday on home cuff:    Noon: 144/82  8pm: 142/88 (prior to BP medication)    This mornin/90    Pts BP in clinic was 127/85  Checked BP with home cuff a few minutes later - 139/91    Pt reports she took procardia dose last at  last evening.    New BP cuff ordered, BP Rx given and instructed pt and significant other to  new cuff at [facility]    RN reviewed pt BPs with Jyotsna Baltazar CNM, agrees with ordering new cuff    Confirmed with midwife what labs to be ordered: AST, ALT, CBC with platelets and PCR. Lab orders placed stat    Pt will  new cuff and continue BP monitoring and BP med at home  Pt to call clinic if BP >140/90 or if symptomatic. Pt verbalizes understanding and reports no further questions    Pt brought to lab for stat lab draw  Let pt know Jyotsna Baltazar does not need to see her today, pts appt cancelled by scheduling    Routing chart to midwives as GREYSON Thornton RN on 10/28/2024 at 2:45 PM  [facility] Triage

## 2024-10-30 ENCOUNTER — ALLIED HEALTH/NURSE VISIT (OUTPATIENT)
Dept: OBGYN | Facility: CLINIC | Age: 29
End: 2024-10-30
Payer: COMMERCIAL

## 2024-10-30 VITALS — DIASTOLIC BLOOD PRESSURE: 92 MMHG | HEART RATE: 61 BPM | SYSTOLIC BLOOD PRESSURE: 152 MMHG

## 2024-10-30 DIAGNOSIS — O13.3 GESTATIONAL HTN, THIRD TRIMESTER: ICD-10-CM

## 2024-10-30 DIAGNOSIS — R74.8 ELEVATED LIVER ENZYMES: Primary | Chronic | ICD-10-CM

## 2024-10-30 DIAGNOSIS — O13.3 GESTATIONAL HTN, THIRD TRIMESTER: Primary | ICD-10-CM

## 2024-10-30 PROCEDURE — 99207 PR NO CHARGE NURSE ONLY: CPT

## 2024-10-30 RX ORDER — NIFEDIPINE 30 MG/1
60 TABLET, EXTENDED RELEASE ORAL DAILY
Qty: 60 TABLET | Refills: 3 | Status: ON HOLD | OUTPATIENT
Start: 2024-10-30 | End: 2024-11-02

## 2024-10-30 NOTE — PROGRESS NOTES
Informed pt of [employer] and plan moving forward. Questions answered.  Taty Cervantes RN on 10/30/2024 at 2:03 PM    Update from RN visit earlier today:  Patient here for NST per order from midwife group    External monitors placed after explanation and consent from patient about the procedure.  Denies contractions or cramping, LOF or VB.  Reports active FM    NST INTERPRETATION  29 weeks 4 days  Baseline Rate: 130  Variability: moderate  Accelerations: present  Decelerations: one variable noted  Reactivity confirmed after strip reviewed by Shonna Galdamez CNM    BLOOD PRESSURE CHECK    Subjective:    Vanessa is being seen in clinic for a blood pressure check due to  increased BPs . Patient is pregnant (29w4d).    Patient reports taking the following antihypertensive medications: nifedipine     Patient denies no hypertension symptoms.       Objective:    Patient Vitals for the past 24 hrs:   BP Pulse   10/30/24 1045 (!) 157/97 --   10/30/24 1030 (!) 144/90 61      Clarification of BP checks in office  BP clinic readin/90  2nd BP reading from older cuff (pt cuff) 157/97  3rd BP reading from new cuff (pt cuff) 152/92    Plan:    Blood pressure results are in the HIGH (-159 or DBP ) range of the ACOG Hypertensive Disorders of pregnancy thresholds for pregnant and recently pregnant patients.    Contacted Shonna Galdamez CNM. Provider consulted with MD and an M consult has been placed as an increase in Nifedipine. Discussed plan with patient. Reviewed with patient to call triage number if symptoms (chest pain, shortness of breath, visual changes, severe headache, lower extremity edema or right upper quadrant pain) occur. Return to clinic as scheduled. Vanessa and HAWA Fry verbalized understanding of plan.  Taty Cervantes RN on 10/30/2024 at 3:08 PM

## 2024-11-01 ENCOUNTER — HOSPITAL ENCOUNTER (OUTPATIENT)
Facility: CLINIC | Age: 29
Setting detail: OBSERVATION
Discharge: HOME | End: 2024-11-02
Attending: NURSE PRACTITIONER | Admitting: STUDENT IN AN ORGANIZED HEALTH CARE EDUCATION/TRAINING PROGRAM
Payer: COMMERCIAL

## 2024-11-01 ENCOUNTER — PRE VISIT (OUTPATIENT)
Dept: MATERNAL FETAL MEDICINE | Facility: CLINIC | Age: 29
End: 2024-11-01
Payer: COMMERCIAL

## 2024-11-01 ENCOUNTER — HOSPITAL ENCOUNTER (OUTPATIENT)
Dept: ULTRASOUND IMAGING | Facility: CLINIC | Age: 29
Discharge: HOME | End: 2024-11-01
Attending: OBSTETRICS & GYNECOLOGY
Payer: COMMERCIAL

## 2024-11-01 ENCOUNTER — OFFICE VISIT (OUTPATIENT)
Dept: MATERNAL FETAL MEDICINE | Facility: CLINIC | Age: 29
End: 2024-11-01
Attending: OBSTETRICS & GYNECOLOGY
Payer: COMMERCIAL

## 2024-11-01 VITALS — OXYGEN SATURATION: 100 % | DIASTOLIC BLOOD PRESSURE: 88 MMHG | HEART RATE: 65 BPM | SYSTOLIC BLOOD PRESSURE: 143 MMHG

## 2024-11-01 DIAGNOSIS — O13.3 GESTATIONAL HTN, THIRD TRIMESTER: ICD-10-CM

## 2024-11-01 DIAGNOSIS — O09.93 SUPERVISION OF HIGH RISK PREGNANCY IN THIRD TRIMESTER: Primary | ICD-10-CM

## 2024-11-01 DIAGNOSIS — R74.8 ELEVATED LIVER ENZYMES: Primary | Chronic | ICD-10-CM

## 2024-11-01 DIAGNOSIS — O13.3 GESTATIONAL HTN, THIRD TRIMESTER: Primary | ICD-10-CM

## 2024-11-01 DIAGNOSIS — O09.93 SUPERVISION OF HIGH RISK PREGNANCY IN THIRD TRIMESTER: ICD-10-CM

## 2024-11-01 DIAGNOSIS — R74.8 ELEVATED LIVER ENZYMES: Chronic | ICD-10-CM

## 2024-11-01 LAB
ALBUMIN MFR UR ELPH: 15.8 MG/DL
ALBUMIN SERPL BCG-MCNC: 3.1 G/DL (ref 3.5–5.2)
ALP SERPL-CCNC: 158 U/L (ref 40–150)
ALT SERPL W P-5'-P-CCNC: 68 U/L (ref 0–50)
ANION GAP SERPL CALCULATED.3IONS-SCNC: 17 MMOL/L (ref 7–15)
AST SERPL W P-5'-P-CCNC: 44 U/L (ref 0–45)
BILIRUB SERPL-MCNC: <0.2 MG/DL
BUN SERPL-MCNC: 16.2 MG/DL (ref 6–20)
CALCIUM SERPL-MCNC: 8.7 MG/DL (ref 8.8–10.4)
CHLORIDE SERPL-SCNC: 104 MMOL/L (ref 98–107)
CREAT SERPL-MCNC: 0.58 MG/DL (ref 0.51–0.95)
CREAT UR-MCNC: 87.2 MG/DL
EGFRCR SERPLBLD CKD-EPI 2021: >90 ML/MIN/1.73M2
ERYTHROCYTE [DISTWIDTH] IN BLOOD BY AUTOMATED COUNT: 11.8 % (ref 10–15)
GLUCOSE SERPL-MCNC: 100 MG/DL (ref 70–99)
HCO3 SERPL-SCNC: 16 MMOL/L (ref 22–29)
HCT VFR BLD AUTO: 33.8 % (ref 35–47)
HGB BLD-MCNC: 11.9 G/DL (ref 11.7–15.7)
MCH RBC QN AUTO: 30.1 PG (ref 26.5–33)
MCHC RBC AUTO-ENTMCNC: 35.2 G/DL (ref 31.5–36.5)
MCV RBC AUTO: 86 FL (ref 78–100)
PLATELET # BLD AUTO: 223 10E3/UL (ref 150–450)
POTASSIUM SERPL-SCNC: 4.1 MMOL/L (ref 3.4–5.3)
PROT SERPL-MCNC: 5.9 G/DL (ref 6.4–8.3)
PROT/CREAT 24H UR: 0.18 MG/MG CR (ref 0–0.2)
RBC # BLD AUTO: 3.95 10E6/UL (ref 3.8–5.2)
SODIUM SERPL-SCNC: 137 MMOL/L (ref 135–145)
WBC # BLD AUTO: 9.6 10E3/UL (ref 4–11)

## 2024-11-01 PROCEDURE — 99417 PROLNG OP E/M EACH 15 MIN: CPT | Mod: 25 | Performed by: OBSTETRICS & GYNECOLOGY

## 2024-11-01 PROCEDURE — 76819 FETAL BIOPHYS PROFIL W/O NST: CPT

## 2024-11-01 PROCEDURE — 99215 OFFICE O/P EST HI 40 MIN: CPT | Mod: 25 | Performed by: OBSTETRICS & GYNECOLOGY

## 2024-11-01 PROCEDURE — 84156 ASSAY OF PROTEIN URINE: CPT | Performed by: OBSTETRICS & GYNECOLOGY

## 2024-11-01 PROCEDURE — 99212 OFFICE O/P EST SF 10 MIN: CPT | Mod: 25 | Performed by: OBSTETRICS & GYNECOLOGY

## 2024-11-01 PROCEDURE — 76819 FETAL BIOPHYS PROFIL W/O NST: CPT | Mod: 26 | Performed by: OBSTETRICS & GYNECOLOGY

## 2024-11-01 PROCEDURE — G0378 HOSPITAL OBSERVATION PER HR: HCPCS

## 2024-11-01 PROCEDURE — 76811 OB US DETAILED SNGL FETUS: CPT | Mod: 26 | Performed by: OBSTETRICS & GYNECOLOGY

## 2024-11-01 PROCEDURE — 82040 ASSAY OF SERUM ALBUMIN: CPT | Performed by: OBSTETRICS & GYNECOLOGY

## 2024-11-01 PROCEDURE — 85027 COMPLETE CBC AUTOMATED: CPT | Performed by: OBSTETRICS & GYNECOLOGY

## 2024-11-01 PROCEDURE — 36415 COLL VENOUS BLD VENIPUNCTURE: CPT | Performed by: OBSTETRICS & GYNECOLOGY

## 2024-11-01 ASSESSMENT — ACTIVITIES OF DAILY LIVING (ADL)
ADLS_ACUITY_SCORE: 0

## 2024-11-01 NOTE — NURSING NOTE
Vanessa was seen in [facility] today for level 2 ultrasound and M consult.   Dr. Ochoa into see patient.  Patient reports good fetal movement, denies pain, denies contractions, leaking of fluid, or bleeding.  Patient denies headache, visual changes, epigastric pain related to preeclampsia. Please see [facility] consult note for recommendations.  SBAR given to M MD, see their note in Epic.    Dr. Ochoa recommending inpatient admission. [facility], Dr. Ochoa spoke with Dr. Maggie Norman.

## 2024-11-01 NOTE — PROGRESS NOTES
Vanessa arrives in L/D for 24 hour observation to rule out preeclampsia. Monitors applied with consent. Dr Maggie Norman was called for orders. Plan to get PreE labs, monitor once a shift and watch BP closely. On admission Vanessa denies headache, blurred vision or epigastric distress. Reflexes 2+ bliaterally and no clonus present. Mild swelling noted in legs/ankles.

## 2024-11-01 NOTE — PROGRESS NOTES
"Please see \"Imaging\" tab under \"Chart Review\" for details of today's visit, which is summarized below:    Indication  ========    Gestational hypertension    Impression  =========    1) Houser intrauterine pregnancy at 29w 6d gestational age.  2) None of the anomalies commonly detected by ultrasound were evident in the detailed fetal anatomic survey described above, although evaluation of fetal anatomy was suboptimal as noted above.  3) Growth parameters and estimated fetal weight were consistent with an appropriate for gestation age pattern of growth.  4) The amniotic fluid volume appeared normal.  5) The BPP is reassuring.    Recommendation  ==============    We discussed the findings on today's ultrasound with the patient.    Vanessa states her pregnancy had been overall uncomplicated until her blood pressure was noted to be elevated at 28 weeks. At her OB visit prior to this at 26 weeks, she notes her BP was higher than normal for her, but was still in the normal range. At 28 weeks, her BP was 144/92. Preeclampsia labs were obtained, which demonstrated AST 50 and ALT 80. She then presented to a PT appointment on 10/25/24, where her BP was again elevated and she was instructed to present to L&D for further evaluation. She was started on Nifedipine 30 mg XL daily, which was then increased to 60 mg XL on 10/30/24. Vanessa denies any history of chronic hypertension and in review of Vanessa's chart, I do not see evidence of a diagnosis of CHTN. I reviewed with Vanessa that her overall clinical picture is most consistent with gestational hypertension, and we reviewed the underlying physiologic changes attributed to hypertensive disorders of pregnancy and reviewed that the definitive treatment is delivery, but that a strategy of expectant management with close monitoring of both the maternal and fetal condition can be undertaken once the disease has been clearly evaluated and defined. We reviewed that the role of antihypertensive " medications in the management of hypertensive disorders of pregnancy is solely for acute treatment of severe range blood pressures and during inpatient expectant management of severe preeclampsia.  Therefore, would recommend discontinuation of antihypertensive medications at this time, recommend observation in L&D at least overnight to understand what Vanessa's baseline blood pressures are without impact of antihypertensive medications, as these blood pressure will inform the need for inpatient vs outpatient management. Recommend repeat laboratory evaluation with CBC, CMP, UPC upon admission to hospital as well. If Vanessa meets criteria for preeclampsia with severe features, I would recommend inpatient management until delivery. If she meets criteria for preeclampsia without severe features or gestational hypertension and both maternal and fetal status are reassuring, she can be considered for candidacy for outpatient management with twice weekly BP check, twice weekly BPP and weekly preeclampsia labs (more frequently depending on presence of transaminitis) and serial growth US every 3 weeks (BPP can be scheduled at [facility], recommend follow up US here at [facility] in 3 weeks). Currently, her transaminases are mildly elevated and do not meet criteria for a severe feature of preeclampsia. The chronicity of this is unknown as she did not have an indication for baseline LFT assessment during this pregnancy. Vanessa and Ang were certainly not expecting a hospitalization at this time but agree to this plan for close monitoring at this time. [facility] L&D and Dr. Norman notified; Vanessa will  some things from home and will present to L&D today.    Return to primary provider for continued prenatal care.    Thank-you for the opportunity to participate in the care of this patient. If you have questions regarding today's evaluation or if we can be of further service, please contact the [facility].    **Fetal anomalies  may be present but not detected**    I spent a total of 60 minutes on the date of this encounter including preparing to see the patient (reviewing medical records/tests), in direct face-to-face contact with the patient during her visit with the majority spent counseling and discussing the plan of care and documenting the visit in the electronic medical record. Please see note for details.    MD Gabriela Hamilton Yasuko

## 2024-11-02 ENCOUNTER — NURSE TRIAGE (OUTPATIENT)
Dept: NURSING | Facility: CLINIC | Age: 29
End: 2024-11-02

## 2024-11-02 ENCOUNTER — HOSPITAL ENCOUNTER (OUTPATIENT)
Facility: CLINIC | Age: 29
Discharge: LEFT BEFORE DISCHARGE | End: 2024-11-03
Attending: STUDENT IN AN ORGANIZED HEALTH CARE EDUCATION/TRAINING PROGRAM | Admitting: STUDENT IN AN ORGANIZED HEALTH CARE EDUCATION/TRAINING PROGRAM
Payer: COMMERCIAL

## 2024-11-02 VITALS — RESPIRATION RATE: 16 BRPM | TEMPERATURE: 97.5 F | DIASTOLIC BLOOD PRESSURE: 84 MMHG | SYSTOLIC BLOOD PRESSURE: 141 MMHG

## 2024-11-02 DIAGNOSIS — O09.93 SUPERVISION OF HIGH RISK PREGNANCY IN THIRD TRIMESTER: Primary | ICD-10-CM

## 2024-11-02 DIAGNOSIS — O13.3 GESTATIONAL HTN, THIRD TRIMESTER: ICD-10-CM

## 2024-11-02 LAB
ALBUMIN MFR UR ELPH: 39.1 MG/DL
ALBUMIN SERPL BCG-MCNC: 3.1 G/DL (ref 3.5–5.2)
ALP SERPL-CCNC: 154 U/L (ref 40–150)
ALT SERPL W P-5'-P-CCNC: 64 U/L (ref 0–50)
ANION GAP SERPL CALCULATED.3IONS-SCNC: 15 MMOL/L (ref 7–15)
AST SERPL W P-5'-P-CCNC: 43 U/L (ref 0–45)
BILIRUB SERPL-MCNC: 0.2 MG/DL
BUN SERPL-MCNC: 14 MG/DL (ref 6–20)
CALCIUM SERPL-MCNC: 8.5 MG/DL (ref 8.8–10.4)
CHLORIDE SERPL-SCNC: 104 MMOL/L (ref 98–107)
CREAT SERPL-MCNC: 0.68 MG/DL (ref 0.51–0.95)
CREAT UR-MCNC: 191.9 MG/DL
EGFRCR SERPLBLD CKD-EPI 2021: >90 ML/MIN/1.73M2
ERYTHROCYTE [DISTWIDTH] IN BLOOD BY AUTOMATED COUNT: 12.2 % (ref 10–15)
GLUCOSE SERPL-MCNC: 86 MG/DL (ref 70–99)
HCO3 SERPL-SCNC: 17 MMOL/L (ref 22–29)
HCT VFR BLD AUTO: 38 % (ref 35–47)
HGB BLD-MCNC: 13.1 G/DL (ref 11.7–15.7)
MCH RBC QN AUTO: 29.8 PG (ref 26.5–33)
MCHC RBC AUTO-ENTMCNC: 34.5 G/DL (ref 31.5–36.5)
MCV RBC AUTO: 87 FL (ref 78–100)
PLATELET # BLD AUTO: 242 10E3/UL (ref 150–450)
POTASSIUM SERPL-SCNC: 4.5 MMOL/L (ref 3.4–5.3)
PROT SERPL-MCNC: 5.9 G/DL (ref 6.4–8.3)
PROT/CREAT 24H UR: 0.2 MG/MG CR (ref 0–0.2)
RBC # BLD AUTO: 4.39 10E6/UL (ref 3.8–5.2)
RUPTURE OF FETAL MEMBRANES BY ROM PLUS: NEGATIVE
SODIUM SERPL-SCNC: 136 MMOL/L (ref 135–145)
WBC # BLD AUTO: 7.2 10E3/UL (ref 4–11)

## 2024-11-02 PROCEDURE — 84112 EVAL AMNIOTIC FLUID PROTEIN: CPT | Performed by: STUDENT IN AN ORGANIZED HEALTH CARE EDUCATION/TRAINING PROGRAM

## 2024-11-02 PROCEDURE — G0463 HOSPITAL OUTPT CLINIC VISIT: HCPCS

## 2024-11-02 PROCEDURE — G0378 HOSPITAL OBSERVATION PER HR: HCPCS

## 2024-11-02 PROCEDURE — 85014 HEMATOCRIT: CPT | Performed by: STUDENT IN AN ORGANIZED HEALTH CARE EDUCATION/TRAINING PROGRAM

## 2024-11-02 PROCEDURE — 84155 ASSAY OF PROTEIN SERUM: CPT | Performed by: STUDENT IN AN ORGANIZED HEALTH CARE EDUCATION/TRAINING PROGRAM

## 2024-11-02 PROCEDURE — 84156 ASSAY OF PROTEIN URINE: CPT | Performed by: STUDENT IN AN ORGANIZED HEALTH CARE EDUCATION/TRAINING PROGRAM

## 2024-11-02 PROCEDURE — 36415 COLL VENOUS BLD VENIPUNCTURE: CPT | Performed by: STUDENT IN AN ORGANIZED HEALTH CARE EDUCATION/TRAINING PROGRAM

## 2024-11-02 ASSESSMENT — ACTIVITIES OF DAILY LIVING (ADL)
ADLS_ACUITY_SCORE: 0

## 2024-11-02 NOTE — DISCHARGE SUMMARY
[facility] Discharge Summary    Vanessa Turcios MRN# [id number]   Age: 29 year old YOB: 1995     Date of Admission:  2024  Date of Discharge:  24   Admitting Physician:  Maggie Norman MD  Discharge Physician:  Alisia Rogers MD     Admission Diagnosis:  Supervision of high risk pregnancy in third trimester  Gestational HTN, third trimester  Elevated LFT  IUP at 29w6d    Discharge Diagnosis:  Same  IUP at 30w0d      Procedures:  NST    Consultations:  None    Medications prior to admission:  Medications Prior to Admission   Medication Sig Dispense Refill Last Dose/Taking    Prenatal Vit-Fe Fumarate-FA (PRENATAL MULTIVITAMIN  PLUS IRON) 27-1 MG TABS Take by mouth daily   2024    desoximetasone (TOPICORT) 0.25 % OINT ointment Apply topically 2 times daily as needed for inflammation.       valACYclovir (VALTREX) 500 MG tablet Take 1 tablet (500 mg) by mouth 2 times daily 6 tablet 3     [DISCONTINUED] NIFEdipine ER OSMOTIC (PROCARDIA XL) 30 MG 24 hr tablet Take 2 tablets (60 mg) by mouth daily. 60 tablet 3      Brief History of Presentation:    Vanessa Turcios is a 29 year old female who is  pregnant female at 29w6d  being admitted for observation to rule out pre-eclampsia.     She is a patient of the [facility] team, but is in the process of transferring care the the physician group due to recent diagnosis of elevated blood pressures.      She was first noted to have BP elevation at her appointment on 10/24.   Then on 10/25 was sent to triage for extended monitoring due to elevated ALT.   She reported no symptoms of pre-eclampsia other than swelling.   BP's ranged in the 140's-150's/80's-90's. She was started on Nifedipine and recommend to have appointment with [facility] which occurred today (). Due to this being a new diagnosis, and not CHTN, [facility] recommended stopping the antihypertensive and to observe overnight to evaluate for severe pre-eclampsia. If blood  pressure remain below the severe range than would be considered GHTN, and could be monitored outpatient.      Hospital Course:    Admitted for BP monitoring. BPs remained MR to high MR. Patient had no signs/symptoms of preE. Labs remained wnl.   FHR monitoring reassuring.         Discharge Instructions:  Call or present to labor and delivery if you experience:   -Regular painful contractions concerning for labor   -Leakage of fluid concerning for ruptured membranes   -Decreased fetal movement   -Bright red vaginal bleeding    -Headache, vision changes, upper abdominal pain, significant increase in swelling,   generalized unwell feeling    Follow up:  - Weekly MD visit  - Weekly preE labs  - Twice weekly BPP  - Twice weekly BP check  - Twice daily home BP monitoring  - q3 week growth US  - GI referral       Discharge Medications:  Current Discharge Medication List        CONTINUE these medications which have NOT CHANGED    Details   Prenatal Vit-Fe Fumarate-FA (PRENATAL MULTIVITAMIN  PLUS IRON) 27-1 MG TABS Take by mouth daily      desoximetasone (TOPICORT) 0.25 % OINT ointment Apply topically 2 times daily as needed for inflammation.      valACYclovir (VALTREX) 500 MG tablet Take 1 tablet (500 mg) by mouth 2 times daily  Qty: 6 tablet, Refills: 3    Associated Diagnoses: Herpes simplex vulvovaginitis           STOP taking these medications       NIFEdipine ER OSMOTIC (PROCARDIA XL) 30 MG 24 hr tablet Comments:   Reason for Stopping:             Alisia Rogers MD, S  11/02/24

## 2024-11-02 NOTE — H&P
[facility] Antepartum History and Physical    Vanessa Turcios MRN# [id number]   Age: 29 year old YOB: 1995     Date of Admission:  2024    Primary care provider: No Ref-Primary, Physician           Chief Complaint:   Vanessa Turcios is a 29 year old female who is  pregnant female at 29w6d  being admitted for observation to rule out pre-eclampsia.    She is a patient of the [facility] team, but is in the process of transferring care the the physician group due to recent diagnosis of elevated blood pressures.     She was first noted to have BP elevation at her appointment on 10/24.   Then on 10/25 was sent to triage for extended monitoring due to elevated ALT.   She reported no symptoms of pre-eclampsia other than swelling.   BP's ranged in the 140's-150's/80's-90's. She was started on Nifedipine and recommend to have appointment with [facility] which occurred today (). Due to this being a new diagnosis, and not CHTN, [facility] recommended stopping the antihypertensive and to observe overnight to evaluate for severe pre-eclampsia. If blood pressure remain below the severe range than would be considered GHTN, and could be monitored outpatient.            Pregnancy history:     OBSTETRIC HISTORY:    OB History    Para Term  AB Living   1 0 0 0 0 0   SAB IAB Ectopic Multiple Live Births   0 0 0 0 0      # Outcome Date GA Lbr Dagoberto/2nd Weight Sex Type Anes PTL Lv   1 Current                EDC: Estimated Date of Delivery: 2025    Prenatal Labs:   Lab Results   Component Value Date    AS Negative 2024    HEPBANG Nonreactive 2024    CHPCRT Negative 2018    GCPCRT  2015     Negative   Negative for N. gonorrhoeae rRNA by transcription mediated amplification.   A negative result by transcription mediated amplification does not preclude the   presence of N. gonorrhoeae infection because results are dependent on proper   and adequate  "collection, absence of inhibitors, and sufficient rRNA to be   detected.      HGB 11.9 11/01/2024       GBS Status:   No results found for: \"GBS\"    Active Problem List  Patient Active Problem List   Diagnosis    Genital herpes    Supervision of high risk pregnancy in third trimester    Elevated liver enzymes    Gestational HTN, third trimester       Medication Prior to Admission  Medications Prior to Admission   Medication Sig Dispense Refill Last Dose/Taking    Prenatal Vit-Fe Fumarate-FA (PRENATAL MULTIVITAMIN  PLUS IRON) 27-1 MG TABS Take by mouth daily   11/1/2024    desoximetasone (TOPICORT) 0.25 % OINT ointment Apply topically 2 times daily as needed for inflammation.       NIFEdipine ER OSMOTIC (PROCARDIA XL) 30 MG 24 hr tablet Take 2 tablets (60 mg) by mouth daily. 60 tablet 3     valACYclovir (VALTREX) 500 MG tablet Take 1 tablet (500 mg) by mouth 2 times daily 6 tablet 3    .        Maternal Past Medical History:     Past Medical History:   Diagnosis Date    Acne     Encounter for IUD removal 09/17/2013    inserted by Dr Egan    Genital herpes 09/03/2013    Immunizations up to date     Uses oral contraception                        Family History:     Family History   Problem Relation Age of Onset    No Known Problems Mother     No Known Problems Father     Colon Cancer Maternal Grandmother     Diabetes Maternal Grandmother     Heart Disease Maternal Grandfather     No Known Problems Paternal Grandmother     No Known Problems Paternal Grandfather     No Known Problems Brother     No Known Problems Sister     Breast Cancer Paternal Aunt         2020    No Known Problems Other      Family history reviewed and updated in [facility]            Social History:   This patient has no significant social history           Physical Exam:     Vitals:    11/01/24 1600 11/01/24 1700 11/01/24 1724 11/01/24 1846   BP: 139/80 (!) 156/89 (!) 141/88 137/89   Resp:    16   Temp:    97.5  F (36.4  C)   TempSrc:    Temporal "     Gen: resting comfortably, in no acute distress  CV: regular rate, well perfused  Pulm: non-labored breathing, no cough  Abd: gravid, soft,   Skin: warm and dry, no rashes/lesions  Psych: appropriate affect  Neuro: A+Ox3   Cervix: deferred  Ext: 1+ BLE edema, non-tender    NST:  FHR baseline is 130, Moderate variability, accelerations are present, decelerations are not present. Contractions are not present.    Impression: reactive NST                 Results for orders placed or performed during the hospital encounter of 24 (from the past 24 hours)   CBC with platelets   Result Value Ref Range    WBC Count 9.6 4.0 - 11.0 10e3/uL    RBC Count 3.95 3.80 - 5.20 10e6/uL    Hemoglobin 11.9 11.7 - 15.7 g/dL    Hematocrit 33.8 (L) 35.0 - 47.0 %    MCV 86 78 - 100 fL    MCH 30.1 26.5 - 33.0 pg    MCHC 35.2 31.5 - 36.5 g/dL    RDW 11.8 10.0 - 15.0 %    Platelet Count 223 150 - 450 10e3/uL   Comprehensive metabolic panel   Result Value Ref Range    Sodium 137 135 - 145 mmol/L    Potassium 4.1 3.4 - 5.3 mmol/L    Carbon Dioxide (CO2) 16 (L) 22 - 29 mmol/L    Anion Gap 17 (H) 7 - 15 mmol/L    Urea Nitrogen 16.2 6.0 - 20.0 mg/dL    Creatinine 0.58 0.51 - 0.95 mg/dL    GFR Estimate >90 >60 mL/min/1.73m2    Calcium 8.7 (L) 8.8 - 10.4 mg/dL    Chloride 104 98 - 107 mmol/L    Glucose 100 (H) 70 - 99 mg/dL    Alkaline Phosphatase 158 (H) 40 - 150 U/L    AST 44 0 - 45 U/L    ALT 68 (H) 0 - 50 U/L    Protein Total 5.9 (L) 6.4 - 8.3 g/dL    Albumin 3.1 (L) 3.5 - 5.2 g/dL    Bilirubin Total <0.2 <=1.2 mg/dL   Protein  random urine   Result Value Ref Range    Total Protein Urine mg/dL 15.8   mg/dL    Total Protein Urine mg/mg Creat 0.18 0.00 - 0.20 mg/mg Cr    Creatinine Urine mg/dL 87.2 mg/dL           Assessment:   Vanessa Turcios is a  at 29w6d admitted with observation for Gestational Hypertension          Plan:     1. Repeat Pre-eclampsia Labs WNL. ALT, stable. Not twice normal, so does not meet criteria for  Pre-eclampsia/HELLP  2. NST q shift  3. Per [facility]:   -observation in L&D at least overnight to understand what Vanessa's baseline blood pressures are without impact of antihypertensive medications, as these blood pressure will inform the need for inpatient vs outpatient management.   - If Vanessa meets criteria for preeclampsia with severe features, I would recommend inpatient management until delivery.  - If she meets criteria for preeclampsia without severe features or gestational hypertension and both maternal and fetal status are reassuring, she can be considered for candidacy for outpatient management with twice weekly BP check, twice weekly BPP and weekly preeclampsia labs (more frequently depending on presence of transaminitis) and serial growth US every 3 weeks (BPP can be scheduled at [facility], recommend follow up US here at [facility] in 3 weeks)        Maggie Norman MD  [facility] Obstetrics and Gynecology

## 2024-11-02 NOTE — PROGRESS NOTES
OBSERVATION PROGRESS  ~Oral intake sufficient to maintain hydration status. MET  ~Pain managed on oral pharmacologic and non- pharmacologic interventions.    MET  ~Absence of uterine contractions resulting in cervical change. MET  ~Intact membranes. MET  ~Plan for follow up care in place. NOT MET  ~Able to tolerate oral antibiotics or has a plan in place for infusion of antibiotics at home. MET  ~Normal fetal status (category 1 tracing). MET  ~Absence of vaginal bleeding. MET  ~Additional objectives/discharge goals rule out preeclampsia.   Nurse to notify provider when observation goals have been met and patient is ready for discharge.

## 2024-11-02 NOTE — PROGRESS NOTES
Antepartum Progress Note    Subjective:   Vanessa Turcios is a 29 year old  at 30w0d admitted for BP monitoring in the setting of suspected worsening gHTN, transaminits. No issues since admission.   Has occasional contractions, no LOF, VB. Good FM. No HA, vision changes, SOB, chest pain.       Objective:  Vitals:    24 2030 24 0014 24 0440 24 0441   BP: (!) 159/94 (!) 143/78  (!) 141/84   Resp: 16 16     Temp:  97.9  F (36.6  C) 97.5  F (36.4  C)    TempSrc: Temporal Temporal         No intake/output data recorded.    Gen: Resting comfortably in bed, NAD  CV: Regular rate  Resp: Breathing comfortably on RA  Abd: Gravid, non-tender, non-distended  Ext: non-tender, no edema      FHT: BL 150bpm, moderate variability, + accels 10x10, no decels  [place]: irregular contractions    Assessment/Plan:   Vanessa Turcios is a 29 year old  @ 30w0d by LMP c/w 6w3d YS, admitted for BP monitoring with worsening gHTN, transaminitis .    gHTN  - BPs consistently MR to high MR. No SR BPs.   - No meds   - No signs/symptoms preE  - Labs wnl. Will recheck prior to discharge  - Continue twice daily BP checks and report BPs >160/110 or any signs/symptoms preE  - Will need weekly MD visits  and labs in clinic   - Twice weekly BP check, Twice weekly BPP at clinic, q3w growth US with [facility]     Transaminitis  - Appears to be long standing issue in review of records  - Hepatitis labs today  - Normal US  - Recommend outpatient GI eval- referral placed    -FWB: category I, reactive    -Dispo: Discharge home with close outpatient monitoring.     60 minutes were spent on the date of the encounter doing chart review, review of outside records, review and interpretation of pertinent test results, history and exam, documentation, patient counseling, and further activities as noted above.      Alisia Rogers MD, S  24        Pain - Patient was clinically upgraded due to pain.

## 2024-11-02 NOTE — PLAN OF CARE
Goal Outcome Evaluation:     VSS on RA. Patient denies pain, denies s/sx of pre-eclampsia. BP remains less than severe range. FHT Cat 1, with no contractions. Up independent in room. Continue to monitor.

## 2024-11-02 NOTE — DISCHARGE INSTRUCTIONS
Learning About When to Call Your Doctor During Pregnancy (After 20 Weeks)  Overview  It's common to have concerns about what might be a problem when you're pregnant. Most pregnancies don't have any serious problems. But it's still important to know when to call your doctor if you have certain symptoms or signs of labor.  These are general suggestions. Your doctor may give you some more information about when to call.  When to call your doctor (after 20 weeks)  Call 911  anytime you think you may need emergency care. For example, call if:  You have severe vaginal bleeding. This means you are soaking through a pad each hour for 2 or more hours.  You have sudden, severe pain in your belly.  You have chest pain, are short of breath, or cough up blood.  You passed out (lost consciousness).  You have a seizure.  You see or feel the umbilical cord.  You think you are about to deliver your baby and can't make it safely to the hospital or birthing center.  Call your doctor now or seek immediate medical care if:  You have vaginal bleeding.  You have belly pain.  You have a fever.  You are dizzy or lightheaded, or you feel like you may faint.  You have signs of a blood clot in your leg (called a deep vein thrombosis), such as:  Pain in the calf, back of the knee, thigh, or groin.  Swelling in your leg or groin.  A color change on the leg or groin. The skin may be reddish or purplish, depending on your usual skin color.  You have symptoms of preeclampsia, such as:  Sudden swelling of your face, hands, or feet.  New vision problems (such as dimness, blurring, or seeing spots).  A severe headache.  You have a sudden release of fluid from your vagina. (You think your water broke.)  You've been having regular contractions for an hour. This means that you've had at least 6 contractions within 1 hour, even after you change your position and drink fluids.  You notice that your baby has stopped moving or is moving less than  "normal.  You have signs of heart failure, such as:  New or increased shortness of breath.  New or worse swelling in your legs, ankles, or feet.  Sudden weight gain, such as more than 2 to 3 pounds in a day or 5 pounds in a week.  Feeling so tired or weak that you cannot do your usual activities.  You have symptoms of a urinary tract infection. These may include:  Pain or burning when you urinate.  A frequent need to urinate without being able to pass much urine.  Pain in the flank, which is just below the rib cage and above the waist on either side of the back.  Blood in your urine.  Watch closely for changes in your health, and be sure to contact your doctor if:  You have vaginal discharge that smells bad.  You feel sad, anxious, or hopeless for more than a few days.  You have skin changes, such as a rash, itching, or a yellow color to your skin.  You have other concerns about your pregnancy.  If you have labor signs at 37 weeks or more  If you have signs of labor at 37 weeks or more, your doctor may tell you to call when your labor becomes more active. Symptoms of active labor include:  Contractions that are regular.  Contractions that are less than 5 minutes apart.  Contractions that are hard to talk through.  Follow-up care is a key part of your treatment and safety. Be sure to make and go to all appointments, and call your doctor if you are having problems. It's also a good idea to know your test results and keep a list of the medicines you take.  Where can you learn more?  Go to https://www.[employer].net/patiented  Enter N531 in the search box to learn more about \"Learning About When to Call Your Doctor During Pregnancy (After 20 Weeks).\"  Current as of: July 10, 2023  Content Version: 14.2 2024 [employer] [facility] [employer]   Care instructions adapted under license by your healthcare professional. If you have questions about a medical condition or this instruction, always ask your healthcare professional. " [employer] disclaims any warranty or liability for your use of this information.

## 2024-11-02 NOTE — PROGRESS NOTES
Vanessa has no complaints this am. She denies s and s of preeclampsia. On monitoring, she was noted to have some contractions. She denies feeling these contractions and after voiding they resolved. Dr Rogers visited with Vanessa and her SO. They will be following up very closely in clinic. All signs of preeclampsia were reviewed at length with Vanessa and her SO. Both verbalize understanding in knowing when to call the MD if any of these should occur. Discharged to home undelivered.

## 2024-11-03 ENCOUNTER — HOSPITAL ENCOUNTER (OUTPATIENT)
Facility: CLINIC | Age: 29
End: 2024-11-03
Admitting: STUDENT IN AN ORGANIZED HEALTH CARE EDUCATION/TRAINING PROGRAM
Payer: COMMERCIAL

## 2024-11-03 VITALS — TEMPERATURE: 99 F | DIASTOLIC BLOOD PRESSURE: 74 MMHG | SYSTOLIC BLOOD PRESSURE: 141 MMHG | RESPIRATION RATE: 20 BRPM

## 2024-11-03 LAB
ALBUMIN MFR UR ELPH: 34.4 MG/DL
ALBUMIN SERPL BCG-MCNC: 3.1 G/DL (ref 3.5–5.2)
ALP SERPL-CCNC: 161 U/L (ref 40–150)
ALT SERPL W P-5'-P-CCNC: 58 U/L (ref 0–50)
ANION GAP SERPL CALCULATED.3IONS-SCNC: 15 MMOL/L (ref 7–15)
AST SERPL W P-5'-P-CCNC: 41 U/L (ref 0–45)
BILIRUB SERPL-MCNC: <0.2 MG/DL
BUN SERPL-MCNC: 11.9 MG/DL (ref 6–20)
CALCIUM SERPL-MCNC: 8.7 MG/DL (ref 8.8–10.4)
CHLORIDE SERPL-SCNC: 103 MMOL/L (ref 98–107)
CREAT SERPL-MCNC: 0.62 MG/DL (ref 0.51–0.95)
CREAT UR-MCNC: 86.9 MG/DL
EGFRCR SERPLBLD CKD-EPI 2021: >90 ML/MIN/1.73M2
ERYTHROCYTE [DISTWIDTH] IN BLOOD BY AUTOMATED COUNT: 11.9 % (ref 10–15)
GLUCOSE SERPL-MCNC: 84 MG/DL (ref 70–99)
HAV AB SER QL IA: REACTIVE
HBV SURFACE AG SERPL QL IA: NONREACTIVE
HCO3 SERPL-SCNC: 16 MMOL/L (ref 22–29)
HCT VFR BLD AUTO: 35.2 % (ref 35–47)
HCV AB SERPL QL IA: NONREACTIVE
HGB BLD-MCNC: 12.2 G/DL (ref 11.7–15.7)
MCH RBC QN AUTO: 29.5 PG (ref 26.5–33)
MCHC RBC AUTO-ENTMCNC: 34.7 G/DL (ref 31.5–36.5)
MCV RBC AUTO: 85 FL (ref 78–100)
PLATELET # BLD AUTO: 224 10E3/UL (ref 150–450)
POTASSIUM SERPL-SCNC: 4 MMOL/L (ref 3.4–5.3)
PROT SERPL-MCNC: 5.8 G/DL (ref 6.4–8.3)
PROT/CREAT 24H UR: 0.4 MG/MG CR (ref 0–0.2)
RBC # BLD AUTO: 4.14 10E6/UL (ref 3.8–5.2)
SODIUM SERPL-SCNC: 134 MMOL/L (ref 135–145)
WBC # BLD AUTO: 10.8 10E3/UL (ref 4–11)

## 2024-11-03 PROCEDURE — 86803 HEPATITIS C AB TEST: CPT | Performed by: STUDENT IN AN ORGANIZED HEALTH CARE EDUCATION/TRAINING PROGRAM

## 2024-11-03 PROCEDURE — 86708 HEPATITIS A ANTIBODY: CPT | Performed by: STUDENT IN AN ORGANIZED HEALTH CARE EDUCATION/TRAINING PROGRAM

## 2024-11-03 PROCEDURE — 99417 PROLNG OP E/M EACH 15 MIN: CPT | Performed by: STUDENT IN AN ORGANIZED HEALTH CARE EDUCATION/TRAINING PROGRAM

## 2024-11-03 PROCEDURE — 99215 OFFICE O/P EST HI 40 MIN: CPT | Performed by: STUDENT IN AN ORGANIZED HEALTH CARE EDUCATION/TRAINING PROGRAM

## 2024-11-03 PROCEDURE — 85027 COMPLETE CBC AUTOMATED: CPT | Performed by: STUDENT IN AN ORGANIZED HEALTH CARE EDUCATION/TRAINING PROGRAM

## 2024-11-03 PROCEDURE — G0463 HOSPITAL OUTPT CLINIC VISIT: HCPCS

## 2024-11-03 PROCEDURE — 36415 COLL VENOUS BLD VENIPUNCTURE: CPT | Performed by: STUDENT IN AN ORGANIZED HEALTH CARE EDUCATION/TRAINING PROGRAM

## 2024-11-03 PROCEDURE — 84156 ASSAY OF PROTEIN URINE: CPT | Performed by: STUDENT IN AN ORGANIZED HEALTH CARE EDUCATION/TRAINING PROGRAM

## 2024-11-03 PROCEDURE — 82040 ASSAY OF SERUM ALBUMIN: CPT | Performed by: STUDENT IN AN ORGANIZED HEALTH CARE EDUCATION/TRAINING PROGRAM

## 2024-11-03 PROCEDURE — 87340 HEPATITIS B SURFACE AG IA: CPT | Performed by: STUDENT IN AN ORGANIZED HEALTH CARE EDUCATION/TRAINING PROGRAM

## 2024-11-03 ASSESSMENT — ACTIVITIES OF DAILY LIVING (ADL): ADLS_ACUITY_SCORE: 0

## 2024-11-03 NOTE — PROVIDER NOTIFICATION
11/02/24 [id number]   Provider Notification   Provider Name/Title Rajni   Method of Notification Electronic Page   Request Evaluate - Remote   Notification Reason Patient Arrived;Membrane Status;Maternal Vital Sign Change     OB paged and updated on patient status including: ROM+ negative, Vital signs elevated 150s/90s. Patient reports feeling very anxious. Plan is to repeat labs and continue cycling blood pressures.

## 2024-11-03 NOTE — PROVIDER NOTIFICATION
Due to daylight savings time, labor calculations, ruptured membrane calculations and/or cervical exams times may be off by up to one hour from the actual time. A summary  of the times/calcuations follows:  0109 - patient discharged AMA at 2nd 1hr time frame.

## 2024-11-03 NOTE — PROVIDER NOTIFICATION
Data: Patient assessed in the [facility] for leaking vaginal fluid and elevated blood pressures. Cervical exam deferred. Membranes intact. Contractions are present. Contactions are  , 0 minutes apart, and last 70 seconds. Uterine assessment is no contractions during contractions and soft by palpation at rest. See flowsheets for fetal assessment documentation.     Action: Patient choosing to discharge against medical advice. OB would like admission for Pre-E. Risks reviewed with patient and AMA paperwork signed. Discharge instructions reviewed. Patient instructed to report change in fetal movement, vaginal leaking of fluid or bleeding, abdominal pain, or any concerns related to the pregnancy to provider/clinic.      Response: Patient leaving against medical advise. Patient verbalized understanding of education. Discharged to home at 0109.

## 2024-11-03 NOTE — H&P
"Antepartum History and Physical    Vanessa Turcios MRN# [id number]   Age: 29 year old YOB: 1995     Date of Admission:  11/3/2024    Primary care provider: No Ref-Primary, Physician         Chief Complaint:   Vanessa Turcios is a 29 year old female who is 30w1d pregnant who presents for evaluation of leakage of fluid. She denies ongoing leaking.   During evaluation in ob triage, she was noted to have intermittent severe range blood pressures. She states that blood pressures were obtained during periods of stress and anxiety and does not feel they are accurate. She did not check her BP at home today. Denies HA, vision changes, SOB, chest pain, RUQ pain. +FM. No VMB          Pregnancy history:     OBSTETRIC HISTORY:    OB History    Para Term  AB Living   1 0 0 0 0 0   SAB IAB Ectopic Multiple Live Births   0 0 0 0 0      # Outcome Date GA Lbr Dagoberto/2nd Weight Sex Type Anes PTL Lv   1 Current                EDC: Estimated Date of Delivery: 2025    Prenatal Labs:   Lab Results   Component Value Date    AS Negative 2024    HEPBANG Nonreactive 2024    CHPCRT Negative 2018    GCPCRT  2015     Negative   Negative for N. gonorrhoeae rRNA by transcription mediated amplification.   A negative result by transcription mediated amplification does not preclude the   presence of N. gonorrhoeae infection because results are dependent on proper   and adequate collection, absence of inhibitors, and sufficient rRNA to be   detected.      HGB 12.2 2024       GBS Status:   No results found for: \"GBS\"    Active Problem List  Patient Active Problem List   Diagnosis    Genital herpes    Supervision of high risk pregnancy in third trimester    Elevated liver enzymes    Gestational HTN, third trimester       Medication Prior to Admission  No medications prior to admission.   .        Maternal Past Medical History:     Past Medical History:   Diagnosis Date    Acne     " Encounter for IUD removal 2013    inserted by Dr Eagn    Genital herpes 2013    Immunizations up to date     Uses oral contraception                        Family History:     Family History   Problem Relation Age of Onset    No Known Problems Mother     No Known Problems Father     Colon Cancer Maternal Grandmother     Diabetes Maternal Grandmother     Heart Disease Maternal Grandfather     No Known Problems Paternal Grandmother     No Known Problems Paternal Grandfather     No Known Problems Brother     No Known Problems Sister     Breast Cancer Paternal Aunt         2020    No Known Problems Other      Family history reviewed and updated [facility]            Social History:     Social History     Tobacco Use    Smoking status: Never    Smokeless tobacco: Never   Substance Use Topics    Alcohol use: Not Currently            Review of Systems:   The Review of Systems is negative other than noted in the HPI          Physical Exam:   Vitals were reviewed  Patient Vitals for the past 8 hrs:   BP Temp Temp src Resp   24 0027 (!) 141/74 -- -- --   24 0000 (!) 163/95 -- -- --   24 2340 (!) 156/94 -- -- --   24 2306 (!) 161/90 99  F (37.2  C) Temporal 20   24 2258 (!) 155/95 -- -- --   24 2238 (!) 158/87 -- -- --     Constitutional:   awake, alert, cooperative, no apparent distress, and appears stated age     Lungs:   No increased work of breathing, good air exchange, clear to auscultation bilaterally, no crackles or wheezing     Cardiovascular:  Regular rate, rhythm      Fetal Heart Rate Tracing: reactive and reassuring baseline 13bpm, moderate variability, +accels, no decels  Tocometer: external monitor, no contractions.           Assessment/Plan:   Vanessa Turcios is a 29 year old  at 30w1d with preE w/ SF (SR BPs >4 hours apart) who has left the hospital against medical advice.     Extensively lengthy discussion today about incidental finding of preE w/ SF after  presenting for evaluation of LOF (which has been ruled out).     Patient states that blood pressures are not accurate due to being in pain from contractions and feeling anxious.     We discussed the trend of blood pressures over the pregnancy and now the concerning finding of SR BPs on 10/25 and today. Discussed elevation in protein over the course of 12 hours and that this is a concerning finding in and of itself. Even if she does not feel blood pressure readings are accurate, it was discussed that she is experiencing the physiologic changes of preE. Reviewed that BPs were not SR throughout her stay yesterday, however nearly every blood pressure obtained was mild range or high MR. States that with those MR pressures, she was appropriate for outpatient management, and now she does not feel that the blood pressures today are accurate and do not warrant admission until delivery. Writer inquired, under what circumstances would she consider BP to be accurate?  States she would be open to observation and obtaining serial BPs overnight. If SR are identified in a setting where she is more relaxed, she is accepting of BPs. Wondering if she can go home if no further SR BPs are identified during 24 hours of observation. Informed patient that this would be determined in the context of the full picture, knowing that the diagnosis of preE w/ SF was made. Initially patient was considering observation to obtain more accurate blood pressures, however after given time to discuss with spouse, she decided to leave the hospital as she feels the diagnosis is not accurate.     Writer spent additional time with patient and spouse after this decision was made to gain a better understanding for denying diagnosis and declining admission. Again, they state they feel these blood pressures are not accurately obtained and that she does not meet criteria for preE w/ SF. Writer noted that blood pressures >4 hours apart meet the criteria. They feel  BPs on 10/25 and BPs today are not accurate. We discussed why these blood pressures can be used to make the diagnosis of preE w/ SF, even in the setting of anxiety and that the whole picture must be taken into account.   In the context of elevated UPC, upward trend in BP throughout the pregnancy, and now SR BPs on two occasions, she meets criteria for PreE w/ SF. Making this diagnosis allows for continued assessment of BPs, fetal and maternal status, and early treatment of stroke-range BPs. We discussed providing BMZ for fetal lung maturity, but this does not need to immediately occur since the goal is still delivery at 34w.   We discussed extensively the risks of leaving the hospital against medical advice. Discussed maternal risks of eclampsia, stroke and severe disability or death. Discussed fetal risks of placental abruption, severe developmental disability, and death.   Patient is aware that she may return at any time and her care would continue. A message has been previously sent to schedule outpatient follow-up. Patient states she will continue twice daily BP checks at home and return to the hospital if she has SR BPs.   At the conclusion of the discussion, patient signed paperwork to state she is leaving the hospital against medical advice.     Due to daylight savings time, labor calculations, ruptured membrane calculations and/or cervical exams times may be off by up to one hour from the actual time.    2.5 hours were spent on the date of the encounter doing chart review, review of outside records, review and interpretation of pertinent test results, history and exam, documentation, patient counseling, and further activities as noted above.      Alisia Rogers MD, MHS  11/3/2024

## 2024-11-03 NOTE — DISCHARGE INSTRUCTIONS
Preeclampsia: Care Instructions  Preeclampsia is high blood pressure and signs of organ damage, usually after 20 weeks of pregnancy. If it's not managed, it can harm you or your baby and lead to dangerous seizures (eclampsia).    Most people with preeclampsia have healthy babies. Preeclampsia usually goes away in the weeks after birth.   In rare cases, symptoms of preeclampsia don't show up until days or weeks after childbirth.     Monitor yourself for symptoms of preeclampsia.  Call your doctor if you have symptoms such as a severe headache, vision changes, or sudden swelling in your face and hands.     Keep track of your blood pressure at home if your doctor asks you to.  Ask your doctor to make sure that the monitor is working and that you're using it right. Follow instructions about when to take your blood pressure and what to avoid before taking your blood pressure.     Take medicines exactly as prescribed.  You may need to take medicine to control your blood pressure.     Don't smoke.  If you smoke, quit or cut back as much as you can. If you need help quitting, talk to your doctor.     Gain a healthy amount of weight.  Talk with your doctor about how much weight gain is healthy for you. Gaining too much weight while you're pregnant may be harmful.   When should you call for help?  Share this information with your partner or a friend. They can help you watch for warning signs.  Call 911  anytime you think you may need emergency care. For example, call if:    You passed out (lost consciousness).     You have a seizure.     You have trouble breathing.     You have chest pain.   Call your doctor now or seek immediate medical care if:    You have symptoms of preeclampsia, such as:  Sudden swelling of your face, hands, or feet.  New vision problems (such as dimness, blurring, or seeing spots).  A severe headache.     Your blood pressure is very high, such as 160/110 or higher.     Your blood pressure is higher  "than your doctor told you it should be, or it rises quickly.     You have any vaginal bleeding.     You have new nausea or vomiting.     You think that you are in labor.     You have pain in your belly or pelvis.     You gain weight rapidly.   Follow-up care is a key part of your treatment and safety. Be sure to make and go to all appointments, and call your doctor if you are having problems. It's also a good idea to know your test results and keep a list of the medicines you take.  Where can you learn more?  Go to https://www.[employer].net/patiented  Enter Z954 in the search box to learn more about \"Preeclampsia: Care Instructions.\"  Current as of: July 10, 2023  Content Version: 14.2 2024 [employer].   Care instructions adapted under license by your healthcare professional. If you have questions about a medical condition or this instruction, always ask your healthcare professional. [employer] disclaims any warranty or liability for your use of this information.  Learning About When to Call Your Doctor During Pregnancy (After 20 Weeks)  Overview  It's common to have concerns about what might be a problem when you're pregnant. Most pregnancies don't have any serious problems. But it's still important to know when to call your doctor if you have certain symptoms or signs of labor.  These are general suggestions. Your doctor may give you some more information about when to call.  When to call your doctor (after 20 weeks)  Call 911  anytime you think you may need emergency care. For example, call if:  You have severe vaginal bleeding. This means you are soaking through a pad each hour for 2 or more hours.  You have sudden, severe pain in your belly.  You have chest pain, are short of breath, or cough up blood.  You passed out (lost consciousness).  You have a seizure.  You see or feel the umbilical cord.  You think you are about to deliver your baby and can't make it safely to the hospital " or birthing center.  Call your doctor now or seek immediate medical care if:  You have vaginal bleeding.  You have belly pain.  You have a fever.  You are dizzy or lightheaded, or you feel like you may faint.  You have signs of a blood clot in your leg (called a deep vein thrombosis), such as:  Pain in the calf, back of the knee, thigh, or groin.  Swelling in your leg or groin.  A color change on the leg or groin. The skin may be reddish or purplish, depending on your usual skin color.  You have symptoms of preeclampsia, such as:  Sudden swelling of your face, hands, or feet.  New vision problems (such as dimness, blurring, or seeing spots).  A severe headache.  You have a sudden release of fluid from your vagina. (You think your water broke.)  You've been having regular contractions for an hour. This means that you've had at least 6 contractions within 1 hour, even after you change your position and drink fluids.  You notice that your baby has stopped moving or is moving less than normal.  You have signs of heart failure, such as:  New or increased shortness of breath.  New or worse swelling in your legs, ankles, or feet.  Sudden weight gain, such as more than 2 to 3 pounds in a day or 5 pounds in a week.  Feeling so tired or weak that you cannot do your usual activities.  You have symptoms of a urinary tract infection. These may include:  Pain or burning when you urinate.  A frequent need to urinate without being able to pass much urine.  Pain in the flank, which is just below the rib cage and above the waist on either side of the back.  Blood in your urine.  Watch closely for changes in your health, and be sure to contact your doctor if:  You have vaginal discharge that smells bad.  You feel sad, anxious, or hopeless for more than a few days.  You have skin changes, such as a rash, itching, or a yellow color to your skin.  You have other concerns about your pregnancy.  If you have labor signs at 37 weeks or  "more  If you have signs of labor at 37 weeks or more, your doctor may tell you to call when your labor becomes more active. Symptoms of active labor include:  Contractions that are regular.  Contractions that are less than 5 minutes apart.  Contractions that are hard to talk through.  Follow-up care is a key part of your treatment and safety. Be sure to make and go to all appointments, and call your doctor if you are having problems. It's also a good idea to know your test results and keep a list of the medicines you take.  Where can you learn more?  Go to https://www.[employer].net/patiented  Enter N531 in the search box to learn more about \"Learning About When to Call Your Doctor During Pregnancy (After 20 Weeks).\"  Current as of: July 10, 2023  Content Version: 14.2 2024 [facility] [employer].   Care instructions adapted under license by your healthcare professional. If you have questions about a medical condition or this instruction, always ask your healthcare professional. [employer] disclaims any warranty or liability for your use of this information.    "

## 2024-11-03 NOTE — PROGRESS NOTES
Patient reports feeling very anxious and feels blood pressure machine is not working. Significant other very upset saying that they have not had this high of pressures before and were sent home today.   Writer discussed how blood pressure works, and the cuff can re-inflate if pressures are high.   Significant other refused blood pressure re-check at this time. Writer explain how important it was to keep monitoring her pressures and they still declined. They stated everything was moving to fast.

## 2024-11-03 NOTE — PROGRESS NOTES
Vanessa Turcios admitted to [facility], [facility] fluid.  Pt states she had just gotten out of the shower and noticed some fluid leaking down her leg. She wiped it off but stated then more ran down her leg. No pain, no bleeding. Baby is moving normally.   Discussed plan of care including EFM, routine VS, ROM +.  Pt agreeable.  EFM applied and admission assessment completed.

## 2024-11-03 NOTE — PROVIDER NOTIFICATION
11/02/24 2347   Provider Notification   Provider Name/Title Sue   Method of Notification Phone   Request Evaluate in Person     OB coming in to talk with patient about recommendation of admission. Patient was notified of ROM+ results, need to repeat Pre-E labs, and that OB is coming to hospital. Patient ok with plan.

## 2024-11-03 NOTE — TELEPHONE ENCOUNTER
"OB Triage Call      Is patient's OB/Midwife with the [facility] or [facility]? [facility]- Proceed with triage       Reason for call:  Rupture of membranes.    Assessment:  Per pt she believes her water broke at 7:30- 8 PM tonight, after getting out of shower, Per pt, it felt like a trickle going down her  legs x 3.    Plan: Pt to be seen in LD.    Patient plans to deliver at [facility]    Patient's primary OB Provider is  RANDY Rogers      Per protocol recommendations Patient to be evaluated in L&D. Patient's primary OB is [facility].  Labor and delivery at [facility] ([phone]) notified of patient's pending arrival.  Report given to Deepali at 9:49 PM.      Is patient's delivering hospital on divert? No      30w0d    Estimated Date of Delivery: 2025        OB History    Para Term  AB Living   1 0 0 0 0 0   SAB IAB Ectopic Multiple Live Births   0 0 0 0 0      # Outcome Date GA Lbr Dagoberto/2nd Weight Sex Type Anes PTL Lv   1 Current                No results found for: \"GBS\"                              Mayo Myers, ADOLFO   Reason for Disposition   Leakage of fluid from vagina    Additional Information   Negative: [1] SEVERE abdominal pain (e.g., excruciating) AND [2] constant AND [3] present > 1 hour   Negative: Severe bleeding (e.g., continuous red blood from vagina, or large blood clots)   Negative: Umbilical cord hanging out of the vagina (shiny, white, curled appearance, \"like telephone cord\")   Negative: Uncontrollable urge to push (i.e., feels like baby is coming out now)   Negative: Can see baby   Negative: Sounds like a life-threatening emergency to the triager   Negative: < 20 weeks pregnant   Negative: Vaginal bleeding    Protocols used: Pregnancy - Rupture of [id number]    "

## 2024-11-03 NOTE — PROVIDER NOTIFICATION
11/03/24 0030   Provider Notification   Provider Name/Title Sue   Method of Notification At Bedside     OB in room speaking with patient. Discussing new results and plan of care.

## 2024-11-04 DIAGNOSIS — O14.90 PRE-ECLAMPSIA: ICD-10-CM

## 2024-11-04 DIAGNOSIS — O09.93 SUPERVISION OF HIGH RISK PREGNANCY IN THIRD TRIMESTER: Primary | ICD-10-CM

## 2024-11-05 ENCOUNTER — OFFICE VISIT (OUTPATIENT)
Dept: MATERNAL FETAL MEDICINE | Facility: CLINIC | Age: 29
End: 2024-11-05
Attending: STUDENT IN AN ORGANIZED HEALTH CARE EDUCATION/TRAINING PROGRAM
Payer: COMMERCIAL

## 2024-11-05 ENCOUNTER — HOSPITAL ENCOUNTER (OUTPATIENT)
Dept: ULTRASOUND IMAGING | Facility: CLINIC | Age: 29
Discharge: HOME | End: 2024-11-05
Attending: STUDENT IN AN ORGANIZED HEALTH CARE EDUCATION/TRAINING PROGRAM
Payer: COMMERCIAL

## 2024-11-05 VITALS — HEART RATE: 56 BPM | DIASTOLIC BLOOD PRESSURE: 98 MMHG | SYSTOLIC BLOOD PRESSURE: 149 MMHG

## 2024-11-05 DIAGNOSIS — O09.93 SUPERVISION OF HIGH RISK PREGNANCY IN THIRD TRIMESTER: ICD-10-CM

## 2024-11-05 DIAGNOSIS — O14.90 PRE-ECLAMPSIA: ICD-10-CM

## 2024-11-05 DIAGNOSIS — R74.8 ELEVATED LIVER ENZYMES: Primary | Chronic | ICD-10-CM

## 2024-11-05 DIAGNOSIS — O13.3 GESTATIONAL HTN, THIRD TRIMESTER: ICD-10-CM

## 2024-11-05 DIAGNOSIS — O09.93 SUPERVISION OF HIGH RISK PREGNANCY IN THIRD TRIMESTER: Primary | ICD-10-CM

## 2024-11-05 PROCEDURE — 76819 FETAL BIOPHYS PROFIL W/O NST: CPT

## 2024-11-05 PROCEDURE — 99213 OFFICE O/P EST LOW 20 MIN: CPT | Mod: 25 | Performed by: STUDENT IN AN ORGANIZED HEALTH CARE EDUCATION/TRAINING PROGRAM

## 2024-11-05 PROCEDURE — 76819 FETAL BIOPHYS PROFIL W/O NST: CPT | Mod: 26 | Performed by: STUDENT IN AN ORGANIZED HEALTH CARE EDUCATION/TRAINING PROGRAM

## 2024-11-05 NOTE — NURSING NOTE
Patient presents to [facility] for BPP at 30w3d due to Pre E without severe features. Positive fetal movement. Denies LOF, vaginal bleeding or cramping/contractions. Denies HA, vision changes or epigastric pain. Has been checking BP's at home with two different cuffs and most BP's range from high 130's/80's. BP today 149/98, P56. Repeat /99. SBAR given to [facility] MD, see their note in Epic. Patient appreciates plan to closely follow OP with twice weekly BPP's and BP check at each US.    Lizeth Boyle RN

## 2024-11-05 NOTE — PROGRESS NOTES
"Please see \"Imaging\" tab under \"Chart Review\" for details of today's visit.    Liliam Richmond    "

## 2024-11-06 NOTE — CONFIDENTIAL NOTE
DIAGNOSIS: Elevated liver enzymes    Appt Date:  11.19.2024   NOTES STATUS DETAILS   OFFICE NOTE from referring provider Internal 11.01.2024  Alisia Rogers MD    DISCHARGE SUMMARY from hospital Internal 11.01.2024  Alisia Rogers MD     10.25.2024 [facility]   MEDICATION LIST Internal    IMAGING     ULTRASOUND LIVER Internal 10.25.2024  US Abd Limited    LABS     COMPLETE METABOLIC PANEL Internal 11.03.2024   HEPATITIS C ANTIBODY Internal 11.03.2024   HEPATITIS B SURFACE ANTIGEN Internal 11.03.2024

## 2024-11-08 ENCOUNTER — HOSPITAL ENCOUNTER (OUTPATIENT)
Dept: ULTRASOUND IMAGING | Facility: CLINIC | Age: 29
Discharge: HOME | End: 2024-11-08
Attending: STUDENT IN AN ORGANIZED HEALTH CARE EDUCATION/TRAINING PROGRAM
Payer: COMMERCIAL

## 2024-11-08 ENCOUNTER — TELEPHONE (OUTPATIENT)
Dept: OBGYN | Facility: CLINIC | Age: 29
End: 2024-11-08

## 2024-11-08 ENCOUNTER — OFFICE VISIT (OUTPATIENT)
Dept: MATERNAL FETAL MEDICINE | Facility: CLINIC | Age: 29
End: 2024-11-08
Attending: STUDENT IN AN ORGANIZED HEALTH CARE EDUCATION/TRAINING PROGRAM
Payer: COMMERCIAL

## 2024-11-08 VITALS — DIASTOLIC BLOOD PRESSURE: 88 MMHG | HEART RATE: 72 BPM | SYSTOLIC BLOOD PRESSURE: 144 MMHG

## 2024-11-08 DIAGNOSIS — O14.93 PRE-ECLAMPSIA IN THIRD TRIMESTER: Primary | ICD-10-CM

## 2024-11-08 DIAGNOSIS — O09.93 SUPERVISION OF HIGH RISK PREGNANCY IN THIRD TRIMESTER: ICD-10-CM

## 2024-11-08 DIAGNOSIS — O14.90 PRE-ECLAMPSIA: ICD-10-CM

## 2024-11-08 PROCEDURE — 76819 FETAL BIOPHYS PROFIL W/O NST: CPT

## 2024-11-08 PROCEDURE — 99212 OFFICE O/P EST SF 10 MIN: CPT | Mod: 25 | Performed by: STUDENT IN AN ORGANIZED HEALTH CARE EDUCATION/TRAINING PROGRAM

## 2024-11-08 PROCEDURE — 76819 FETAL BIOPHYS PROFIL W/O NST: CPT | Mod: 26 | Performed by: STUDENT IN AN ORGANIZED HEALTH CARE EDUCATION/TRAINING PROGRAM

## 2024-11-08 PROCEDURE — 99213 OFFICE O/P EST LOW 20 MIN: CPT | Mod: 25 | Performed by: STUDENT IN AN ORGANIZED HEALTH CARE EDUCATION/TRAINING PROGRAM

## 2024-11-08 NOTE — TELEPHONE ENCOUNTER
[facility] Call [facility]    Phone Message    May a detailed message be left on voicemail: yes     Reason for Call: Other: Pt is 31 weeks high risk currently being seen at [facility]. Pt is possibly looking to transfer care to [facility] to be seen by Heri. Pt is wanting a call back from care team member with questions she has before scheduling. Please call pt      Action Taken: Message routed to:  Other: [facility]    Travel Screening: Not Applicable

## 2024-11-08 NOTE — NURSING NOTE
Patient reports good fetal movement,  denies contractions, leaking of fluid, or bleeding.   Patient denies reports on and off headaches that resolve with rest, denies visual changes, epigastric pain related to preeclampsia. SBAR given to ALEJANDRA VIERA, see their note in Epic.

## 2024-11-08 NOTE — PROGRESS NOTES
The patient was seen for an [facility] in [facility] today.  For a detailed report of the ultrasound examination, please see the ultrasound report which can be found under the imaging tab.    If you have questions regarding today's evaluation or if we can be of further service, please contact the [facility].    Lyndsay Queen MD  , OB/GYN  Maternal-Fetal Medicine

## 2024-11-11 ENCOUNTER — TELEPHONE (OUTPATIENT)
Dept: OBGYN | Facility: CLINIC | Age: 29
End: 2024-11-11
Payer: COMMERCIAL

## 2024-11-11 ENCOUNTER — HOSPITAL ENCOUNTER (INPATIENT)
Facility: CLINIC | Age: 29
End: 2024-11-11
Attending: STUDENT IN AN ORGANIZED HEALTH CARE EDUCATION/TRAINING PROGRAM | Admitting: STUDENT IN AN ORGANIZED HEALTH CARE EDUCATION/TRAINING PROGRAM
Payer: COMMERCIAL

## 2024-11-11 DIAGNOSIS — O14.13 SEVERE PRE-ECLAMPSIA IN THIRD TRIMESTER: ICD-10-CM

## 2024-11-11 DIAGNOSIS — O14.10 SEVERE PRE-ECLAMPSIA, ANTEPARTUM: ICD-10-CM

## 2024-11-11 DIAGNOSIS — Z98.891 S/P CESAREAN SECTION: Primary | ICD-10-CM

## 2024-11-11 DIAGNOSIS — Z34.03 ENCOUNTER FOR SUPERVISION OF LOW-RISK FIRST PREGNANCY IN THIRD TRIMESTER: ICD-10-CM

## 2024-11-11 DIAGNOSIS — A60.00 GENITAL HERPES SIMPLEX, UNSPECIFIED SITE: ICD-10-CM

## 2024-11-11 PROBLEM — Z36.89 ENCOUNTER FOR TRIAGE IN PREGNANT PATIENT: Status: ACTIVE | Noted: 2024-11-11

## 2024-11-11 LAB
ABO/RH(D): NORMAL
ALT SERPL W P-5'-P-CCNC: 26 U/L (ref 0–50)
ANTIBODY SCREEN: NEGATIVE
AST SERPL W P-5'-P-CCNC: 33 U/L (ref 0–45)
CREAT SERPL-MCNC: 0.67 MG/DL (ref 0.51–0.95)
EGFRCR SERPLBLD CKD-EPI 2021: >90 ML/MIN/1.73M2
ERYTHROCYTE [DISTWIDTH] IN BLOOD BY AUTOMATED COUNT: 11.8 % (ref 10–15)
HCT VFR BLD AUTO: 37.7 % (ref 35–47)
HGB BLD-MCNC: 13.5 G/DL (ref 11.7–15.7)
HOLD SPECIMEN: NORMAL
HOLD SPECIMEN: NORMAL
MCH RBC QN AUTO: 29.7 PG (ref 26.5–33)
MCHC RBC AUTO-ENTMCNC: 35.8 G/DL (ref 31.5–36.5)
MCV RBC AUTO: 83 FL (ref 78–100)
PLATELET # BLD AUTO: 244 10E3/UL (ref 150–450)
RBC # BLD AUTO: 4.54 10E6/UL (ref 3.8–5.2)
SPECIMEN EXPIRATION DATE: NORMAL
WBC # BLD AUTO: 11.3 10E3/UL (ref 4–11)

## 2024-11-11 PROCEDURE — 250N000013 HC RX MED GY IP 250 OP 250 PS 637

## 2024-11-11 PROCEDURE — 120N000002 HC R&B MED SURG/OB UMMC

## 2024-11-11 PROCEDURE — G0463 HOSPITAL OUTPT CLINIC VISIT: HCPCS

## 2024-11-11 PROCEDURE — 82565 ASSAY OF CREATININE: CPT | Performed by: STUDENT IN AN ORGANIZED HEALTH CARE EDUCATION/TRAINING PROGRAM

## 2024-11-11 PROCEDURE — 250N000011 HC RX IP 250 OP 636

## 2024-11-11 PROCEDURE — 85027 COMPLETE CBC AUTOMATED: CPT | Performed by: STUDENT IN AN ORGANIZED HEALTH CARE EDUCATION/TRAINING PROGRAM

## 2024-11-11 PROCEDURE — 84450 TRANSFERASE (AST) (SGOT): CPT | Performed by: STUDENT IN AN ORGANIZED HEALTH CARE EDUCATION/TRAINING PROGRAM

## 2024-11-11 PROCEDURE — 87653 STREP B DNA AMP PROBE: CPT

## 2024-11-11 PROCEDURE — 250N000013 HC RX MED GY IP 250 OP 250 PS 637: Performed by: STUDENT IN AN ORGANIZED HEALTH CARE EDUCATION/TRAINING PROGRAM

## 2024-11-11 PROCEDURE — 86850 RBC ANTIBODY SCREEN: CPT | Performed by: STUDENT IN AN ORGANIZED HEALTH CARE EDUCATION/TRAINING PROGRAM

## 2024-11-11 PROCEDURE — 258N000003 HC RX IP 258 OP 636: Performed by: STUDENT IN AN ORGANIZED HEALTH CARE EDUCATION/TRAINING PROGRAM

## 2024-11-11 PROCEDURE — 96372 THER/PROPH/DIAG INJ SC/IM: CPT

## 2024-11-11 PROCEDURE — 84460 ALANINE AMINO (ALT) (SGPT): CPT | Performed by: STUDENT IN AN ORGANIZED HEALTH CARE EDUCATION/TRAINING PROGRAM

## 2024-11-11 PROCEDURE — 86900 BLOOD TYPING SEROLOGIC ABO: CPT | Performed by: STUDENT IN AN ORGANIZED HEALTH CARE EDUCATION/TRAINING PROGRAM

## 2024-11-11 PROCEDURE — 86780 TREPONEMA PALLIDUM: CPT

## 2024-11-11 PROCEDURE — 250N000011 HC RX IP 250 OP 636: Performed by: STUDENT IN AN ORGANIZED HEALTH CARE EDUCATION/TRAINING PROGRAM

## 2024-11-11 RX ORDER — HYDROXYZINE HYDROCHLORIDE 50 MG/1
50 TABLET, FILM COATED ORAL EVERY 6 HOURS PRN
Status: DISCONTINUED | OUTPATIENT
Start: 2024-11-11 | End: 2024-11-19 | Stop reason: HOSPADM

## 2024-11-11 RX ORDER — PROCHLORPERAZINE MALEATE 10 MG
10 TABLET ORAL EVERY 6 HOURS PRN
Status: DISCONTINUED | OUTPATIENT
Start: 2024-11-11 | End: 2024-11-15

## 2024-11-11 RX ORDER — MAGNESIUM SULFATE IN WATER 40 MG/ML
2 INJECTION, SOLUTION INTRAVENOUS CONTINUOUS
Status: DISCONTINUED | OUTPATIENT
Start: 2024-11-11 | End: 2024-11-12

## 2024-11-11 RX ORDER — VALACYCLOVIR HYDROCHLORIDE 500 MG/1
500 TABLET, FILM COATED ORAL 2 TIMES DAILY
Status: DISCONTINUED | OUTPATIENT
Start: 2024-11-11 | End: 2024-11-11

## 2024-11-11 RX ORDER — MAGNESIUM SULFATE HEPTAHYDRATE 40 MG/ML
4 INJECTION, SOLUTION INTRAVENOUS
Status: COMPLETED | OUTPATIENT
Start: 2024-11-11 | End: 2024-11-11

## 2024-11-11 RX ORDER — LIDOCAINE 40 MG/G
CREAM TOPICAL
Status: DISCONTINUED | OUTPATIENT
Start: 2024-11-11 | End: 2024-11-12

## 2024-11-11 RX ORDER — LIDOCAINE 40 MG/G
CREAM TOPICAL
Status: DISCONTINUED | OUTPATIENT
Start: 2024-11-11 | End: 2024-11-15

## 2024-11-11 RX ORDER — MAGNESIUM SULFATE HEPTAHYDRATE 40 MG/ML
2 INJECTION, SOLUTION INTRAVENOUS
Status: COMPLETED | OUTPATIENT
Start: 2024-11-11 | End: 2024-11-11

## 2024-11-11 RX ORDER — SODIUM CHLORIDE, SODIUM LACTATE, POTASSIUM CHLORIDE, CALCIUM CHLORIDE 600; 310; 30; 20 MG/100ML; MG/100ML; MG/100ML; MG/100ML
10-125 INJECTION, SOLUTION INTRAVENOUS CONTINUOUS
Status: DISCONTINUED | OUTPATIENT
Start: 2024-11-11 | End: 2024-11-12

## 2024-11-11 RX ORDER — LABETALOL HYDROCHLORIDE 5 MG/ML
20-80 INJECTION, SOLUTION INTRAVENOUS EVERY 10 MIN PRN
Status: DISCONTINUED | OUTPATIENT
Start: 2024-11-11 | End: 2024-11-15

## 2024-11-11 RX ORDER — DIPHENHYDRAMINE HCL 25 MG
25 CAPSULE ORAL EVERY 6 HOURS PRN
Status: DISCONTINUED | OUTPATIENT
Start: 2024-11-11 | End: 2024-11-15

## 2024-11-11 RX ORDER — MAGNESIUM SULFATE HEPTAHYDRATE 40 MG/ML
2 INJECTION, SOLUTION INTRAVENOUS
Status: DISCONTINUED | OUTPATIENT
Start: 2024-11-11 | End: 2024-11-19 | Stop reason: HOSPADM

## 2024-11-11 RX ORDER — MAGNESIUM SULFATE HEPTAHYDRATE 40 MG/ML
4 INJECTION, SOLUTION INTRAVENOUS ONCE
Status: COMPLETED | OUTPATIENT
Start: 2024-11-11 | End: 2024-11-11

## 2024-11-11 RX ORDER — VALACYCLOVIR HYDROCHLORIDE 1 G/1
1000 TABLET, FILM COATED ORAL 3 TIMES DAILY
Status: DISCONTINUED | OUTPATIENT
Start: 2024-11-11 | End: 2024-11-18

## 2024-11-11 RX ORDER — ONDANSETRON 4 MG/1
4 TABLET, ORALLY DISINTEGRATING ORAL EVERY 6 HOURS PRN
Status: DISCONTINUED | OUTPATIENT
Start: 2024-11-11 | End: 2024-11-15

## 2024-11-11 RX ORDER — LABETALOL 200 MG/1
200 TABLET, FILM COATED ORAL EVERY 8 HOURS SCHEDULED
Status: DISCONTINUED | OUTPATIENT
Start: 2024-11-11 | End: 2024-11-13

## 2024-11-11 RX ORDER — HYDROXYZINE HYDROCHLORIDE 50 MG/1
100 TABLET, FILM COATED ORAL
Status: DISCONTINUED | OUTPATIENT
Start: 2024-11-11 | End: 2024-11-15

## 2024-11-11 RX ORDER — HYDRALAZINE HYDROCHLORIDE 20 MG/ML
10 INJECTION INTRAMUSCULAR; INTRAVENOUS
Status: DISCONTINUED | OUTPATIENT
Start: 2024-11-11 | End: 2024-11-19 | Stop reason: HOSPADM

## 2024-11-11 RX ORDER — ONDANSETRON 2 MG/ML
4 INJECTION INTRAMUSCULAR; INTRAVENOUS EVERY 6 HOURS PRN
Status: DISCONTINUED | OUTPATIENT
Start: 2024-11-11 | End: 2024-11-15

## 2024-11-11 RX ORDER — MAGNESIUM SULFATE HEPTAHYDRATE 40 MG/ML
4 INJECTION, SOLUTION INTRAVENOUS
Status: DISCONTINUED | OUTPATIENT
Start: 2024-11-11 | End: 2024-11-19 | Stop reason: HOSPADM

## 2024-11-11 RX ORDER — ACETAMINOPHEN 325 MG/1
975 TABLET ORAL EVERY 6 HOURS PRN
Status: DISCONTINUED | OUTPATIENT
Start: 2024-11-11 | End: 2024-11-15

## 2024-11-11 RX ORDER — DIPHENHYDRAMINE HYDROCHLORIDE 50 MG/ML
25 INJECTION INTRAMUSCULAR; INTRAVENOUS EVERY 6 HOURS PRN
Status: DISCONTINUED | OUTPATIENT
Start: 2024-11-11 | End: 2024-11-15

## 2024-11-11 RX ORDER — LABETALOL HYDROCHLORIDE 5 MG/ML
INJECTION, SOLUTION INTRAVENOUS
Status: COMPLETED
Start: 2024-11-11 | End: 2024-11-11

## 2024-11-11 RX ORDER — METOCLOPRAMIDE HYDROCHLORIDE 5 MG/ML
10 INJECTION INTRAMUSCULAR; INTRAVENOUS EVERY 6 HOURS PRN
Status: DISCONTINUED | OUTPATIENT
Start: 2024-11-11 | End: 2024-11-15

## 2024-11-11 RX ORDER — BISACODYL 10 MG
10 SUPPOSITORY, RECTAL RECTAL DAILY PRN
Status: DISCONTINUED | OUTPATIENT
Start: 2024-11-11 | End: 2024-11-15

## 2024-11-11 RX ORDER — METOCLOPRAMIDE 10 MG/1
10 TABLET ORAL EVERY 6 HOURS PRN
Status: DISCONTINUED | OUTPATIENT
Start: 2024-11-11 | End: 2024-11-15

## 2024-11-11 RX ORDER — BETAMETHASONE SODIUM PHOSPHATE AND BETAMETHASONE ACETATE 3; 3 MG/ML; MG/ML
12 INJECTION, SUSPENSION INTRA-ARTICULAR; INTRALESIONAL; INTRAMUSCULAR; SOFT TISSUE EVERY 24 HOURS
Status: COMPLETED | OUTPATIENT
Start: 2024-11-11 | End: 2024-11-12

## 2024-11-11 RX ORDER — ONDANSETRON 4 MG/1
8 TABLET, ORALLY DISINTEGRATING ORAL EVERY 8 HOURS PRN
Status: DISCONTINUED | OUTPATIENT
Start: 2024-11-11 | End: 2024-11-11

## 2024-11-11 RX ORDER — LIDOCAINE 40 MG/G
CREAM TOPICAL
Status: DISCONTINUED | OUTPATIENT
Start: 2024-11-11 | End: 2024-11-19 | Stop reason: HOSPADM

## 2024-11-11 RX ORDER — CALCIUM GLUCONATE 94 MG/ML
1 INJECTION, SOLUTION INTRAVENOUS
Status: DISCONTINUED | OUTPATIENT
Start: 2024-11-11 | End: 2024-11-19 | Stop reason: HOSPADM

## 2024-11-11 RX ORDER — MAGNESIUM SULFATE HEPTAHYDRATE 40 MG/ML
2 INJECTION, SOLUTION INTRAVENOUS ONCE
Status: COMPLETED | OUTPATIENT
Start: 2024-11-11 | End: 2024-11-11

## 2024-11-11 RX ORDER — SODIUM PHOSPHATE,MONO-DIBASIC 19G-7G/118
1 ENEMA (ML) RECTAL DAILY PRN
Status: DISCONTINUED | OUTPATIENT
Start: 2024-11-11 | End: 2024-11-15

## 2024-11-11 RX ADMIN — ACETAMINOPHEN 975 MG: 325 TABLET, FILM COATED ORAL at 20:16

## 2024-11-11 RX ADMIN — MAGNESIUM SULFATE IN WATER 2 G: 40 INJECTION, SOLUTION INTRAVENOUS at 17:33

## 2024-11-11 RX ADMIN — HYDROXYZINE HYDROCHLORIDE 50 MG: 50 TABLET, FILM COATED ORAL at 20:01

## 2024-11-11 RX ADMIN — BETAMETHASONE SODIUM PHOSPHATE AND BETAMETHASONE ACETATE 12 MG: 3; 3 INJECTION, SUSPENSION INTRA-ARTICULAR; INTRALESIONAL; INTRAMUSCULAR at 18:39

## 2024-11-11 RX ADMIN — SODIUM CHLORIDE, POTASSIUM CHLORIDE, SODIUM LACTATE AND CALCIUM CHLORIDE 50 ML/HR: 600; 310; 30; 20 INJECTION, SOLUTION INTRAVENOUS at 16:50

## 2024-11-11 RX ADMIN — LABETALOL HYDROCHLORIDE 200 MG: 200 TABLET, FILM COATED ORAL at 23:14

## 2024-11-11 RX ADMIN — MAGNESIUM SULFATE HEPTAHYDRATE 4 G: 40 INJECTION, SOLUTION INTRAVENOUS at 16:54

## 2024-11-11 RX ADMIN — HYDRALAZINE HYDROCHLORIDE 10 MG: 20 INJECTION INTRAMUSCULAR; INTRAVENOUS at 18:25

## 2024-11-11 RX ADMIN — LABETALOL HYDROCHLORIDE 20 MG: 5 INJECTION, SOLUTION INTRAVENOUS at 16:33

## 2024-11-11 RX ADMIN — LABETALOL HYDROCHLORIDE 80 MG: 5 INJECTION, SOLUTION INTRAVENOUS at 17:25

## 2024-11-11 RX ADMIN — LABETALOL HYDROCHLORIDE 40 MG: 5 INJECTION, SOLUTION INTRAVENOUS at 17:05

## 2024-11-11 RX ADMIN — MAGNESIUM SULFATE HEPTAHYDRATE 2 G/HR: 40 INJECTION, SOLUTION INTRAVENOUS at 17:45

## 2024-11-11 ASSESSMENT — ACTIVITIES OF DAILY LIVING (ADL)
ADLS_ACUITY_SCORE: 0

## 2024-11-11 NOTE — H&P
Antepartum History and Physical     2024  Vanessa Turcios  [id number]      HPI     Vanessa Turcios is a 29 year old  at 31w2d by 6w3d US who presents after measuring elevated blood pressures at home. Vanessa reports that she has been dealing with elevated blood pressures this pregnancy, which was first seen at 28w6d. She was initially started on Nifedipine XL 30 mg daily on 10/25, and it was subsequently raised to 60 mg on 10/30. Vanessa was seen at the [facility] office on , where Dr. Ochoa recommended she discontinue the antihypertensives in order to assess her true blood pressures and need for inpatient vs outpatient management. After being monitored on labor and delivery from -, she was ruled out for preeclampsia and sent home with strict return precautions and close follow up.     Vanessa was then seen at [facility] on 11/3, at the time with leakage of fluid and concern for pre-term labor. She was found to have pre-eclampsia with severe features at this time, by blood pressures taken >4h apart. She was made aware of this diagnosis, however opted to leave the hospital AMA as she felt these blood pressures were not accurate due to anxiety about the possibility of  labor and pain from contractions. She explained that she would come back to labor and delivery if she measured high blood pressures again at home, not in the setting of contractions and anxiety.     Over the course of the past week, Vanessa has measured Bps in the 140s/80s at home. Today, she measured a pressure in the 160's/100s, which prompted her to come in to be seen. She also reports a headache, which has not gone away with rest like it usually does. In the last day, she also has noticed increased lower extremity edema. She also endorses prodromal symptoms of an HSV outbreak, but has not yet seen any lesions. She has not been taking valtrex prophylaxis.    She denies vision changes, chest pain, shortness of breath,  fever, chills, nausea, vomiting or other systemic complaints. She denies vaginal bleeding or loss of fluid and is feeling normal fetal movement.     ROS:  No vision changes, nausea, vomiting, fevers, chills, chest pain, SOB,  abdominal pain, constipation, diarrhea, dysuria, changes in vaginal discharge.     Her pregnancy has been complicated by:  - Genital herpes  - Elevated liver enzymes  - Gestational HTN, third trimester  - PreE with severe features (by blood pressure)       OB History    Para Term  AB Living   1 0 0 0 0 0   SAB IAB Ectopic Multiple Live Births   0 0 0 0 0      # Outcome Date GA Lbr Dagoberto/2nd Weight Sex Type Anes PTL Lv   1 Current                Past Medical History     Past Medical History:   Diagnosis Date    Acne     Development delay     Elevated liver enzymes 10/25/2024    Encounter for supervision of low-risk first pregnancy in third trimester 10/25/2024    Genital herpes 2013    Gestational HTN, third trimester 10/25/2024       Past Surgical History     Past Surgical History:   Procedure Laterality Date    NO HISTORY OF SURGERY      WISDOM TOOTH EXTRACTION      in high school     Medications     Medications Prior to Admission   Medication Sig Dispense Refill Last Dose/Taking    Prenatal Vit-Fe Fumarate-FA (PRENATAL MULTIVITAMIN  PLUS IRON) 27-1 MG TABS Take by mouth daily   2024    desoximetasone (TOPICORT) 0.25 % OINT ointment Apply topically 2 times daily as needed for inflammation.       valACYclovir (VALTREX) 500 MG tablet Take 1 tablet (500 mg) by mouth 2 times daily (Patient not taking: Reported on 2024) 6 tablet 3 Not Taking     Allergies   No Known Allergies    Family History     Family History   Problem Relation Age of Onset    No Known Problems Mother     No Known Problems Father     Colon Cancer Maternal Grandmother     Diabetes Maternal Grandmother     Heart Disease Maternal Grandfather     No Known Problems Paternal Grandmother     No Known  Problems Paternal Grandfather     No Known Problems Brother     No Known Problems Sister     Breast Cancer Paternal Aunt         2020    No Known Problems Other        Social History     Social History     Socioeconomic History    Marital status: Single     Spouse name: Darrick to Ang    Number of children: 0    Years of education: None    Highest education level: None   Occupational History    Occupation: Student     Comment: [employer]/accupuncture     Employer: STUDENT    Occupation: Accupuncturist     Employer: SELF     Comment: [employer]    Occupation:     Occupation: Student     Comment: [employer] --massage   Tobacco Use    Smoking status: Never    Smokeless tobacco: Never   Vaping Use    Vaping status: Never Used   Substance and Sexual Activity    Alcohol use: Not Currently    Drug use: Not Currently     Types: Marijuana    Sexual activity: Yes     Partners: Male     Social Drivers of Health     Financial Resource Strain: Unknown (11/1/2024)    Financial Resource Strain     Within the past 12 months, have you or your family members you live with been unable to get utilities (heat, electricity) when it was really needed?: Patient unable to answer   Food Insecurity: Unknown (11/1/2024)    Food Insecurity     Within the past 12 months, did you worry that your food would run out before you got money to buy more?: Patient unable to answer     Within the past 12 months, did the food you bought just not last and you didn t have money to get more?: Patient unable to answer   Transportation Needs: Unknown (11/1/2024)    Transportation Needs     Within the past 12 months, has lack of transportation kept you from medical appointments, getting your medicines, non-medical meetings or appointments, work, or from getting things that you need?: Patient unable to answer   Interpersonal Safety: Unknown (11/2/2024)    Interpersonal Safety     Do you feel physically and emotionally safe  where you currently live?: Patient unable to answer     Within the past 12 months, have you been hit, slapped, kicked or otherwise physically hurt by someone?: Patient unable to answer     Within the past 12 months, have you been humiliated or emotionally abused in other ways by your partner or ex-partner?: Patient unable to answer   Housing Stability: Unknown (11/1/2024)    Housing Stability     Do you have housing? : Patient unable to answer     Are you worried about losing your housing?: Patient unable to answer       ROS   10-point ROS negative except as indicated in HPI.    Physical Exam     Vitals:    11/11/24 1607 11/11/24 1628 11/11/24 1700   BP: (!) 168/92 (!) 178/99 (!) 167/90     General: alert, oriented female, resting in bed in NAD  CV: regular rate and rhythm, normal s1 and s2, no murmurs  Lungs: clear bilaterally, no crackles or wheezes  Abdomen: soft, gravid, non-tender  Extremities: bilateral lower extremities non-tender with pitting edema present    FHT: baseline 130, mod variability, accelerations present, no decelerations  [place]: quiet     Labs      Latest Reference Range & Units 11/11/24 16:37   Creatinine 0.51 - 0.95 mg/dL 0.67   GFR Estimate >60 mL/min/1.73m2 >90   ALT 0 - 50 U/L 26   AST 0 - 45 U/L 33   WBC 4.0 - 11.0 10e3/uL 11.3 (H)   Hemoglobin 11.7 - 15.7 g/dL 13.5   Hematocrit 35.0 - 47.0 % 37.7   Platelet Count 150 - 450 10e3/uL 244   RBC Count 3.80 - 5.20 10e6/uL 4.54   MCV 78 - 100 fL 83   MCH 26.5 - 33.0 pg 29.7   MCHC 31.5 - 36.5 g/dL 35.8   RDW 10.0 - 15.0 % 11.8     Lab Results   Component Value Date    PAP NIL 02/04/2019        Imaging     [facility] US Comprehensive Single (11/1):    IMPRESSION  ---------------------------------------------------------------------------------------------------------  1) Houser intrauterine pregnancy at 29w 6d gestational age.  2) None of the anomalies commonly detected by ultrasound were evident in the detailed fetal anatomic survey described above,  although evaluation of fetal anatomy was  suboptimal as noted above.  3) Growth parameters and estimated fetal weight were consistent with an appropriate for gestation age pattern of growth.  4) The amniotic fluid volume appeared normal.  5) The BPP is reassuring.     Assessment/Plan     Vanessa Turcios is a 29 year old  at 31w2d by 6w3d US who presents after measuring elevated blood pressures at home. She met criteria for PreE with SF by blood pressures on 11/3 and again on  in triage, so recommendation was made to admit for management of SR BP with IV antihypertensives and seizure prophylaxis with IV magnesium sulfate. Patient was amenable to this plan.    Briefly reviewed indications for urgent and emergent  delivery, including maternal and fetal status, and confirmed that if recommended in an urgent setting, Vanessa would accept a  delivery, under GETA if necessary. Confirmed that Vanessa consents to a blood transfusion in the case of an emergency.     #PreE with severe features (BP)   Discussed at length with Vanessa and her  Ang the pathophysiology of preeclampsia with severe features, including the maternal risks of seizure, stroke, end organ damage, and the fetal risks associated. Reviewed recommendation for delivery by 34w at the latest, however that it is very likely delivery will be recommended and indicated prior to 34w in Vanessa's case. Reviewed that reasons to recommend delivery would be sustained severe range BP not responsive or inadequately responsive to antihypertensives, and other persistent severe features including headache refractory to medication, vision changes, lab changes, as well as fetal indications such as fetal intolerance of volatile blood pressures, preeclampsia, placental insufficiency.   - PreE labs wnl on admission   - S/p IV labetalol 20/40/80 (1705), IV hydralazine 10 (1825)  - Starting PO labetalol 200 TID  - Mg 6g (556)> 2g/h  - Clinical magnesium  checks q4h    #Genital HSV  Confirmed with Vanessa that she has recently had prodromal symptoms of genital HSV, and given this we do not recommend vaginal delivery as  HSV can be devastating and vaginal deliveries are in this case contraindicated. Vanessa expressed understanding.   - Valacyclovir therapeutic dosing, 1g TID  - If considering vaginal delivery, will perform bright light exam and SSE      # Inpatient Management  - up ad reinadlo  - NPO  - SCD's   - Q72 hour CBC type/screen     # FWB  Reviewed that due to this possible imminent delivery, recommend betamethasone administration for fetal lung maturity, with which Vanessa and Ang were amenable. Discussed briefly the ALPS study findings, as well as limited data/concerns associated with late  administration of steroids and association with neurocognitive changes in infants delivered at term, and reassured Vanessa and Ang that this does not apply to their situation as she is  and will be delivering before term.  - Continuous monitoring while on MgSO4  - Ultrasound surveillance plan: repeat Mariah and BPP ordered for tomorrow 24  - BMZ discussed with patient, BMZ #1 administered at 1839  - Magnesium initiated for PreE with SF, will function for neuroprotection as well  - NICU consulted, patient and  not prepared to meet with NICU at this time     # Routine prenatal care  - Rh positive; Antibody negative  - Rubella immune, Hepatitis B NR, Hepatitis C NR, HIV NR, RPR negative (1st trimester), GBS not yet collected   - GC/CT not yet collected  - GCT wnl   - Other prenatal labs wnl   - Imaging     -- Datinw3d US    -- Anatomy: 24 (29w6d), EFW 12%, AC 22%   - Immunizations: s/p Tdap, will discuss Flu, COVID, RSV   - Pap last  23 - NILM    - Contraception: Will discuss if delivery indicated  - Feeding: Will discuss if delivery indicated     # Delivery Plan:    - MOD:  delivery given prodromal HSV symptoms  - TOD: Not yet discussed      Kristy Pierce, MS4    Patient seen and care plan discussed under supervision of Dr. Regina Spann, PGY-3 and Dr. Delilah Sol.     Resident/Fellow Attestation   I, Regina Spann MD, was present with the medical student who participated in the service and in the documentation of the note.  I have verified the history and personally performed the physical exam and medical decision making.  I agree with the assessment and plan of care as documented in the note, and have made changes to the above note accordingly.    Regina Spann MD  Obstetrics & Gynecology, PGY-3  11/12/2024 2:09 AM    I have seen and examined the patient with the resident. I have reviewed and agree with the note.   Delilah Sol MD

## 2024-11-11 NOTE — TELEPHONE ENCOUNTER
Vanessa called to report BP of 164/100. Dx of pre-E w/ SF, early delivery recommended. Transferring care to us tomorrow, but has not as of yet. Not on BP meds.  No HA (has been having mild HAs the past week), no visual changes, feet have been swollen but they are pitting today.    No BP meds at this time per MFM, who wanted to see baseline BPs for guidance on inpt vs outpatient treatment. Recommended inpt on 11/3, pt left AMA.    Pt OK to come to our L&D instead of [facility] per Dr. Sol, since she's transferring care to us tomorrow. Called L&D to notify them that she was coming. If pt does end up admitted, we will likely not need the nurse OB intake phone call scheduled for tomorrow.

## 2024-11-11 NOTE — TELEPHONE ENCOUNTER
Pt has pre-E w/ SF, BPs are in the 140 range. Looking to transfer here due to pre-E w/ SF.    Scheduled pt for RN YAZAN appt and first provider OB. Recommended she continue care with her previous OB until establishing with us.

## 2024-11-12 ENCOUNTER — APPOINTMENT (OUTPATIENT)
Dept: ULTRASOUND IMAGING | Facility: CLINIC | Age: 29
End: 2024-11-12
Payer: COMMERCIAL

## 2024-11-12 LAB
ALBUMIN SERPL BCG-MCNC: 3.2 G/DL (ref 3.5–5.2)
ALP SERPL-CCNC: 172 U/L (ref 40–150)
ALT SERPL W P-5'-P-CCNC: 25 U/L (ref 0–50)
ANION GAP SERPL CALCULATED.3IONS-SCNC: 12 MMOL/L (ref 7–15)
AST SERPL W P-5'-P-CCNC: 25 U/L (ref 0–45)
BILIRUB SERPL-MCNC: 0.2 MG/DL
BUN SERPL-MCNC: 19.9 MG/DL (ref 6–20)
CALCIUM SERPL-MCNC: 7.7 MG/DL (ref 8.8–10.4)
CHLORIDE SERPL-SCNC: 100 MMOL/L (ref 98–107)
CREAT SERPL-MCNC: 0.81 MG/DL (ref 0.51–0.95)
EGFRCR SERPLBLD CKD-EPI 2021: >90 ML/MIN/1.73M2
ERYTHROCYTE [DISTWIDTH] IN BLOOD BY AUTOMATED COUNT: 11.9 % (ref 10–15)
GLUCOSE SERPL-MCNC: 124 MG/DL (ref 70–99)
GP B STREP DNA SPEC QL NAA+PROBE: NEGATIVE
HCO3 SERPL-SCNC: 19 MMOL/L (ref 22–29)
HCT VFR BLD AUTO: 36.5 % (ref 35–47)
HGB BLD-MCNC: 12.9 G/DL (ref 11.7–15.7)
MCH RBC QN AUTO: 29.9 PG (ref 26.5–33)
MCHC RBC AUTO-ENTMCNC: 35.3 G/DL (ref 31.5–36.5)
MCV RBC AUTO: 85 FL (ref 78–100)
PLATELET # BLD AUTO: 205 10E3/UL (ref 150–450)
POTASSIUM SERPL-SCNC: 4.9 MMOL/L (ref 3.4–5.3)
PROT SERPL-MCNC: 6.1 G/DL (ref 6.4–8.3)
RBC # BLD AUTO: 4.32 10E6/UL (ref 3.8–5.2)
SODIUM SERPL-SCNC: 131 MMOL/L (ref 135–145)
T PALLIDUM AB SER QL: NONREACTIVE
WBC # BLD AUTO: 14.5 10E3/UL (ref 4–11)

## 2024-11-12 PROCEDURE — 120N000002 HC R&B MED SURG/OB UMMC

## 2024-11-12 PROCEDURE — 99223 1ST HOSP IP/OBS HIGH 75: CPT | Performed by: STUDENT IN AN ORGANIZED HEALTH CARE EDUCATION/TRAINING PROGRAM

## 2024-11-12 PROCEDURE — 258N000003 HC RX IP 258 OP 636

## 2024-11-12 PROCEDURE — 76819 FETAL BIOPHYS PROFIL W/O NST: CPT

## 2024-11-12 PROCEDURE — 250N000011 HC RX IP 250 OP 636

## 2024-11-12 PROCEDURE — 85018 HEMOGLOBIN: CPT

## 2024-11-12 PROCEDURE — 80051 ELECTROLYTE PANEL: CPT

## 2024-11-12 PROCEDURE — 76818 FETAL BIOPHYS PROFILE W/NST: CPT | Mod: 26 | Performed by: OBSTETRICS & GYNECOLOGY

## 2024-11-12 PROCEDURE — 99223 1ST HOSP IP/OBS HIGH 75: CPT | Mod: AI | Performed by: STUDENT IN AN ORGANIZED HEALTH CARE EDUCATION/TRAINING PROGRAM

## 2024-11-12 PROCEDURE — 84155 ASSAY OF PROTEIN SERUM: CPT

## 2024-11-12 PROCEDURE — 99232 SBSQ HOSP IP/OBS MODERATE 35: CPT | Mod: 25 | Performed by: OBSTETRICS & GYNECOLOGY

## 2024-11-12 PROCEDURE — 250N000013 HC RX MED GY IP 250 OP 250 PS 637

## 2024-11-12 PROCEDURE — 36415 COLL VENOUS BLD VENIPUNCTURE: CPT

## 2024-11-12 PROCEDURE — 85041 AUTOMATED RBC COUNT: CPT

## 2024-11-12 RX ADMIN — VALACYCLOVIR 1000 MG: 500 TABLET, FILM COATED ORAL at 13:02

## 2024-11-12 RX ADMIN — LABETALOL HYDROCHLORIDE 200 MG: 200 TABLET, FILM COATED ORAL at 15:09

## 2024-11-12 RX ADMIN — HYDROXYZINE HYDROCHLORIDE 100 MG: 50 TABLET, FILM COATED ORAL at 23:47

## 2024-11-12 RX ADMIN — VALACYCLOVIR 1000 MG: 500 TABLET, FILM COATED ORAL at 18:28

## 2024-11-12 RX ADMIN — ACETAMINOPHEN 975 MG: 325 TABLET, FILM COATED ORAL at 15:25

## 2024-11-12 RX ADMIN — BETAMETHASONE SODIUM PHOSPHATE AND BETAMETHASONE ACETATE 12 MG: 3; 3 INJECTION, SUSPENSION INTRA-ARTICULAR; INTRALESIONAL; INTRAMUSCULAR at 18:38

## 2024-11-12 RX ADMIN — LABETALOL HYDROCHLORIDE 200 MG: 200 TABLET, FILM COATED ORAL at 23:23

## 2024-11-12 RX ADMIN — VALACYCLOVIR 1000 MG: 500 TABLET, FILM COATED ORAL at 08:35

## 2024-11-12 RX ADMIN — ACETAMINOPHEN 975 MG: 325 TABLET, FILM COATED ORAL at 08:29

## 2024-11-12 RX ADMIN — HYDROXYZINE HYDROCHLORIDE 100 MG: 50 TABLET, FILM COATED ORAL at 03:46

## 2024-11-12 RX ADMIN — LABETALOL HYDROCHLORIDE 200 MG: 200 TABLET, FILM COATED ORAL at 07:44

## 2024-11-12 RX ADMIN — SODIUM CHLORIDE, POTASSIUM CHLORIDE, SODIUM LACTATE AND CALCIUM CHLORIDE 50 ML/HR: 600; 310; 30; 20 INJECTION, SOLUTION INTRAVENOUS at 12:36

## 2024-11-12 RX ADMIN — MAGNESIUM SULFATE HEPTAHYDRATE 2 G/HR: 40 INJECTION, SOLUTION INTRAVENOUS at 12:35

## 2024-11-12 NOTE — CONSULTS
Acupuncture Clinical Internship Intake and Treatment Documentation   [facility]    Date:  11/12/2024  Patient Name:  Vanessa Turcios   YOB: 1995     Repeat Patient:  No  Has patient had acupoint/acupressure treatment before:  Yes    Signed consent placed in the medical record:  Yes  Patient/Family verbalizes understanding of risks and benefits:  Yes  Required information provided to patient:  Yes    Diagnosis:  Maternity*kayode:  1/11/25/Pre E with SF  Encounter for triage in pregnant patient  Encounter for supervision of low-risk first pregnancy in third trimester    Patient condition and treatment: Encounter for supervision of low-risk first pregnancy in third trimester  Reason for Intervention Today/Chief Complaint:  Patient would like to work on Stress, Anxiety, Fatigue and Sleep    Isolation:  No  Type:  None    PRE-SCORE:  mild    Other Western medical information:  Encounter for supervision of low-risk first pregnancy in third trimester    Medications   Current Facility-Administered Medications:     acetaminophen (TYLENOL) tablet 975 mg, 975 mg, Oral, Q6H PRN, Regina Spann MD, 975 mg at 11/12/24 0829    betamethasone acet & sod phos (CELESTONE) injection 12 mg, 12 mg, Intramuscular, Q24H, Regina Spann MD, 12 mg at 11/11/24 1839    bisacodyl (DULCOLAX) suppository 10 mg, 10 mg, Rectal, Daily PRN, Regina Spann MD    calcium gluconate 10 % injection 1 g, 1 g, Intravenous, Once PRN, Regina Spann MD    calcium gluconate 10 % injection 1 g, 1 g, Intravenous, Once PRN, Regina Spann MD    diphenhydrAMINE (BENADRYL) capsule 25 mg, 25 mg, Oral, Q6H PRN **OR** diphenhydrAMINE (BENADRYL) injection 25 mg, 25 mg, Intravenous, Q6H PRN, Regina Spann MD    hydrALAZINE (APRESOLINE) injection 10 mg, 10 mg, Intravenous, Q20 Min PRN, Regina Spann MD, 10 mg at 11/11/24 1825    hydrOXYzine HCl (ATARAX) tablet 100 mg, 100 mg, Oral, At Bedtime PRN, Zana  MD Regina, 100 mg at 11/12/24 0346    hydrOXYzine HCl (ATARAX) tablet 50 mg, 50 mg, Oral, Q6H PRN, Delilah Sol MD, 50 mg at 11/11/24 2001    labetalol (NORMODYNE) tablet 200 mg, 200 mg, Oral, Q8H BAMBI, Regina Spann MD, 200 mg at 11/12/24 0744    labetalol (NORMODYNE/TRANDATE) injection 20-80 mg, 20-80 mg, Intravenous, Q10 Min PRN, Regina Spann MD, 80 mg at 11/11/24 1725    lactated ringers infusion,  mL/hr, Intravenous, Continuous, Regina Spann MD, Last Rate: 50 mL/hr at 11/12/24 0730, 50 mL/hr at 11/12/24 0730    lidocaine (LMX4) cream, , Topical, Q1H PRN, Regina Spann MD    lidocaine (LMX4) cream, , Topical, Q1H PRN, Regina Spann MD    lidocaine 1 % 0.1-1 mL, 0.1-1 mL, Other, Q1H PRN, Regina Spann MD    lidocaine 1 % 0.1-1 mL, 0.1-1 mL, Other, Q1H PRN, Regina Spann MD    magnesium sulfate 2 g in 50 mL sterile water intermittent infusion, 2 g, Intravenous, Once PRN, Regina Spann MD    magnesium sulfate 4 g in 100 mL sterile water intermittent infusion, 4 g, Intravenous, Once PRN, Regina Spann MD    magnesium sulfate infusion, 2 g/hr, Intravenous, Continuous, Regina Spann MD, Last Rate: 50 mL/hr at 11/12/24 0730, 2 g/hr at 11/12/24 0730    metoclopramide (REGLAN) tablet 10 mg, 10 mg, Oral, Q6H PRN **OR** metoclopramide (REGLAN) injection 10 mg, 10 mg, Intravenous, Q6H PRN, Regina Spann MD    midazolam (VERSED) injection 2 mg, 2 mg, Intravenous, Q5 Min PRN, Regina Spann MD    midazolam (VERSED) injection 2 mg, 2 mg, Intravenous, Q5 Min PRN, Regina Spann MD    ondansetron (ZOFRAN ODT) ODT tab 4 mg, 4 mg, Oral, Q6H PRN **OR** ondansetron (ZOFRAN) injection 4 mg, 4 mg, Intravenous, Q6H PRN, Regina Spann MD    ondansetron (ZOFRAN) injection 4 mg, 4 mg, Intravenous, Q6H PRN, Regina Spann MD    prochlorperazine (COMPAZINE) tablet 10 mg, 10 mg, Oral, Q6H PRN **OR** prochlorperazine (COMPAZINE) injection 10 mg, 10 mg, Intravenous, Q6H PRN,  Regina Spann MD    sodium chloride (PF) 0.9% PF flush 3 mL, 3 mL, Intracatheter, Q8H, Regina Spann MD, 3 mL at 11/11/24 1637    sodium chloride (PF) 0.9% PF flush 3 mL, 3 mL, Intracatheter, q1 min prn, Regina Spann MD    sodium chloride (PF) 0.9% PF flush 3 mL, 3 mL, Intracatheter, Q8H, Regina Spann MD    sodium chloride (PF) 0.9% PF flush 3 mL, 3 mL, Intracatheter, q1 min prn, Regina Spann MD    sodium phosphate (FLEET ENEMA) 1 enema, 1 enema, Rectal, Daily PRN, Regina Spann MD    valACYclovir (VALTREX) tablet 1,000 mg, 1,000 mg, Oral, TID, Regina Spann MD, 1,000 mg at 11/12/24 0835    No current outpatient medications on file.       Pre-Treatment Assessment  Chief Complaint/ Reason for Intervention Today:  Patient would like to work on Stress, Anxiety, Fatigue and Sleep    Chief Complaint Pre-Score:  mild   Describe:  The patient presents with stress, anxiety, fatigue and issues with sleep during her 1st pregnancy and since her 3rd trimester 5 wks ago.  Pain Location:  very mild headache that feels like the whole head pressure and right hip pain.  Pre Session Pain:  Mild  Pre Session Anxiety:  Moderate  Pre Session Nausea:  None    10 Traditional Chinese Medicine Assessment Questions  - Cold/ Heat:  due to medications, patient is experiencing chills and overheating. Normally tends to run cool to neutral   - Sweat:  no concerns  - Headaches/Body aches:  mild headache currently rates 1/10 whole head pressure. Right hip occasionally hurts especially being in bed  - Chest/Abdomen:  no concerns  - Digestion:  has issues with anxiety, stomach aches then loose stools  - Bowel Movement/Urination:  BM tends towards loose, urination: no concerns  - Hearing/Vision:  no concerns  - Sleep (prior to hospital):  last night she didn't sleep at all due to new environment at the hospital, she tends towards having issues staying asleep vs falling asleep and usually has vivid dreams that are stress  related or nightmares  - Energy:  due to lack of sleep and current medications she is experiencing fatigue  - Emotions:  has a history of anxiety but manages through holistic care, though now that she is in the hospital and not in control she is having anxiety and stress, overwhelming and racing thoughts.  - Ob Gyn:  31 wks gestation,  1st pregnancy  - Miscellaneous:  patient has swollen lower limbs and toes, and with pulse checking our fingers left imprints. She also has eczema on occasion    Traditional Chinese Medicine Assessment  - TONGUE:  dusky red with thick white coat, peeled sides and quiverring  - PULSE:  left side, full and slippery, right side is weak  - OBSERVATIONS: the patient has visible water retention but was able to relax during treatment     Traditional Chinese Medicine Diagnosis  - BRANCH:  Liver Qi Stagnation with Liver overacting on Spleen and Stomach  - ROOT:  Deficiency of Qi: Spleen, Heart and Kidney     Traditional Chinese Medicine Treatment  - ACUPUNCTURE:  Right side: Pc6, Ht7, Lu9, Lu7, GB41, An Rodrigue and auricular Pope Men, Bi Lateral Yin Martinez, Du24, Sp3, Ki6, Ki7     Magnet informed consent signed and given:  No    Post Treatment Assessment  Chief complaint post score:  same  Post Session Observation:  patient was able to relax during treatment  Patient/Family Education:  yes  Verbal information provided:  Yes  Written information provided:  Yes  All questions answered at time of treatment:  Yes    Treatment/Procedure(s) performed by:  interns: Angélica Mueller    Date: 2024     I attest that this acupuncture treatment was done under my supervision and this note is complete and true.     Supervising acupuncturist:    Ricky Zheng LAc [facility]    [facility] Faculty    [facility] Acupuncture license #: [id number]  P: [phone]

## 2024-11-12 NOTE — PLAN OF CARE
Patient rested comfortably overnight. VSS, afebrile. Denies visual disturbances and epigastric pain. Patient reports intermittent headache, see MAR for medication admin. Denies any LOF, bleeding, and ctx. Continuous magnesium sulfate infusion, see MAR. See flowsheets for FHR tracing and uterine activity documentation. Continue with current plan of care. Patient aware to call nursing with any questions or concerns. Call light within reach.

## 2024-11-12 NOTE — PROGRESS NOTES
Patient's Med List:    Prenatal Multi ([employer]  Organics: Whole Food Gummies)   Organic Fruit + Vitamin Chew   Organs Blend with grass-fed liver, heart and kidney (ancient Nutrition)   Magnesium Glycinate 120mg  (Pure encapsulation)  Adaptogen & Mushroom Coffee (Loida)  Magnificent Mushroom (Loida)  Super potent mushroom boost   Dandy Blend with Dandelion  Instant Herbal beverage

## 2024-11-12 NOTE — PROGRESS NOTES
Maternal-Fetal Medicine   Antepartum Progress Note    Subjective   At time of rounds, Vanessa was resting and just waking up from sleep. Overall, she is feeling a improved this morning, noting that her headache is currently completely gone. She is feeling drowsy from the magnesium and hydroxyzine, which she took around 5 AM to help her sleep. She does feel slightly dehydrated, but notes that she typically drinks a lot of water at home and has been NPO this morning.     Vanessa also mentioned that she did not feel well with the loading dose of magnesium last night, but is now feeling better since she has been on the continuous infusion.    She denies any vision changes, chest pain, shortness of breath, abdominal pain, n/v, or and some improvement to her pedal edema.     Objective     Vitals:    11/12/24 0740 11/12/24 0800 11/12/24 0907 11/12/24 1007   BP: 129/64 134/70 119/55 123/65   BP Location: Right arm Right arm     Patient Position: Semi-Gustafson's Semi-Gustafson's     Cuff Size: Adult Regular Adult Regular     Pulse: 70 71     Resp: 16 16 16 16   Temp:  98.4  F (36.9  C)     TempSrc:  Oral     SpO2: 96% 99% 96% 99%       I/O last 3 completed shifts:  In: 823.33 [I.V.:823.33]  Out: 1250 [Urine:1250]  400 mL out over last 5 hours> 0.92 mL/kg/h    Gen: Resting comfortably in bed, NAD  CV: RRR, no murmurs  Resp: CTAB, no wheezes, no crackles  Abd: Gravid, non-tender, non-distended  Ext: non-tender, 1+ LE edema in bilateral feet  Neuro: biceps and brachioradialis reflexes 2+ bilaterally    FHT: 120 bpm, moderate variability with periods of minimal variability, no accelerations, rare variable or spontaneous decelerations  [place]: quiet  Impression: non-reactive    BPP 6/8 (-2 for movement)    Labs:   Latest Reference Range & Units 11/12/24 08:18   Sodium 135 - 145 mmol/L 131 (L)   Potassium 3.4 - 5.3 mmol/L 4.9   Chloride 98 - 107 mmol/L 100   Carbon Dioxide (CO2) 22 - 29 mmol/L 19 (L)   Urea Nitrogen 6.0 - 20.0 mg/dL 19.9    Creatinine 0.51 - 0.95 mg/dL 0.81   GFR Estimate >60 mL/min/1.73m2 >90   Calcium 8.8 - 10.4 mg/dL 7.7 (L)   Anion Gap 7 - 15 mmol/L 12   Albumin 3.5 - 5.2 g/dL 3.2 (L)   Protein Total 6.4 - 8.3 g/dL 6.1 (L)   Alkaline Phosphatase 40 - 150 U/L 172 (H)   ALT 0 - 50 U/L 25   AST 0 - 45 U/L 25   Bilirubin Total <=1.2 mg/dL 0.2   Glucose 70 - 99 mg/dL 124 (H)   WBC 4.0 - 11.0 10e3/uL 14.5 (H)   Hemoglobin 11.7 - 15.7 g/dL 12.9   Hematocrit 35.0 - 47.0 % 36.5   Platelet Count 150 - 450 10e3/uL 205   RBC Count 3.80 - 5.20 10e6/uL 4.32   MCV 78 - 100 fL 85   MCH 26.5 - 33.0 pg 29.9   MCHC 31.5 - 36.5 g/dL 35.3   RDW 10.0 - 15.0 % 11.9   (L): Data is abnormally low  (H): Data is abnormally high    Assessment/Plan   Vanessa Turcios is a 29 year old  @ 31w3d  by 6w3d US, on HD#2 admitted for pre-eclampsia with severe features by blood pressure. She initially required multiple doses of IV antihypertensives but has subsequently been normotensive. Currently on IV magnesium for seizure prophylaxis and will complete at least a 24h course. Receiving course of betamethasone at this time. Reviewed condition and plan this morning with Vanessa and her . Will continue with interdisciplinary team management while inpatient with  psychology, social work, acupuncture and spiritual services.     #PreE with severe features (BP)   Diagnosed previously by sustained severe range blood pressures and initially managed outpatient per patient preference; now inpatient with goal of delivery of 34 weeks unless earlier indicated. Currently stable and has not required IV antihypertensives for >12 hours and currently asymptomatic with resolution of her headache.  - PreE labs wnl on admission and again this morning  - S/p IV labetalol 20/40/80 (1705, ), IV hydralazine 10 (1825, )  - D#2 PO labetalol 200 TID  - Mg 6g (1654, )> 2g/h. No signs or symptoms of magnesium toxicity at this time. Will likely continue IV  magnesium for 24 hours, although patient considering 48 hours through betamethasone window.  - Clinical magnesium checks q4h - next at 1400  - Urine output adequate, continue to monitor.     #Genital HSV  Confirmed with Vanessa that she has recently had prodromal symptoms of genital HSV, and given this we do not recommend vaginal delivery given potential consequences of  HSV. Discussed recommendation for  delivery in this setting to minimize risk.  - Valacyclovir therapeutic dosing, 1g TID. Patient reports nightmares when dosing is too close to bedtime so will adjust timing accordingly.  - Bright light exam and SSE prn, although not currently indicated since planning  delivery     # Inpatient Management  - up ad reinaldo  - NPO > regular diet   - SCD's   - Q72 hour CBC type/screen  - Consulted  psych and social work in the setting of significant changes in birth planning and subsequent adjustment; acupuncture consult ordered.     # FWB  - Continuous monitoring while on magnesium sulfate. Plan to space to BID monitoring once off magnesium.  - Ultrasound surveillance plan: repeat Mariah on Friday, 11/15 (ord'd)  - BMZ #1 administered at 1839 (), plan for #2 this evening at 1830  - Magnesium initiated for PreE with SF, will function for neuroprotection as well  - NICU consulted, can meet with team when patient and family feel more prepared for this.     # Routine prenatal care  - Rh positive; Antibody negative  - Rubella immune, Hepatitis B NR, Hepatitis C NR, HIV NR, RPR negative (1st trimester), GBS collected and pending  - GC/CT not yet collected  - GCT wnl   - Other prenatal labs wnl   - Imaging     -- Datinw3d US    -- Anatomy: 24 (29w6d), EFW 12%, AC 22%   - Immunizations: s/p Tdap, will discuss Flu, COVID, RSV   - Pap last  23 - NILM    - Contraception: Will discuss if delivery indicated  - Feeding: Will discuss if delivery indicated      # Delivery Plan:    - MOD:   delivery given prodromal HSV symptoms. Verbally consented for  section and blood transfusion but will obtain formal consent later in the day when patient is feeling more ready.  - TOD: Pending clinical course, goal of 34 weeks gestation    Clinically Significant Risk Factors Present on Admission         # Hyponatremia: Lowest Na = 131 mmol/L in last 2 days, will monitor as appropriate   # Hypocalcemia: Lowest Ca = 7.7 mg/dL in last 2 days, will monitor and replace as appropriate     # Hypoalbuminemia: Lowest albumin = 3.2 g/dL at 2024  8:18 AM, will monitor as appropriate     # Hypertension: Noted on problem list        Hypocalcemia likely in the setting of IV magnesium therapy.              Medically Ready for Discharge: Anticipated in 5+ Days    Kristy Pierce, MS4    Patient seen and discussed with Dr. Clarke Montes, PGY-3 and Dr. Estelita Colvin.    I was present with the medical student who participated in the service and in the documentation of this note.  I have verified the history and personally performed the physical exam and medical decision making, and have verified the content of the note, which accurately reflect my assessment of the patient and the plan of care.    Clarke Montes MD  OB/GYN PGY-3  2024 11:45 AM    Physician Attestation   I saw this patient with the resident and agree with the resident/fellow's findings and plan of care as documented in the note.      Key findings: 30 yo  at 31w 3d admitted with preeclampsia with severe features. Received IV antihypertensive on admission, now on labetalol 200 mg twice daily and normotensive. Receiving magnesium sulfate for neuroprotection and seizure prophylaxis - discussed stopping this evening and resuming when delivery is more imminent. Received first dose of BMZ, with plan for second dose this evening. Plan to reassess fetal growth on 11/15/2024 with BPP given decline in fetal growth from 45th to 12th percentile on  most recent ultrasound. Preeclampsia labs normal, although creatinine increased slightly today (0.81 mg/dL). Continue daily preeclampsia labs at this time.     Discussed that if delivery is indicated at this time that would recommend delivery by  section given prodromal HSV symptoms.    BPP 6/8 this am. Has since had a reactive NST today with BPP score now 8/10, and reassuring. Plan to continue with continuous fetal monitoring while on magnesium. If reassuring monitoring will decrease to BID fetal monitoring once off magnesium sulfate.    45 MINUTES SPENT BY ME on the date of service doing chart review, history, exam, documentation & further activities per the note.    I have personally reviewed the following data over the past 24 hrs:    14.5 (H)  \   12.9   / 205     131 (L) 100 19.9 /  124 (H)   4.9 19 (L) 0.81 \     ALT: 25 AST: 25 AP: 172 (H) TBILI: 0.2   ALB: 3.2 (L) TOT PROTEIN: 6.1 (L) LIPASE: N/A         Estelita Colvin MD  Date of Service (when I saw the patient): 24

## 2024-11-12 NOTE — PROGRESS NOTES
Magnesium Check  S:  Patient reports feeling okay, didn't sleep much overnight. Headache remains improved. Denies vision changes, chest pain, shortness of breath, RUQ pain.    O:  Patient Vitals for the past 24 hrs:   BP Temp Temp src Pulse Resp SpO2   11/12/24 0703 113/59 98.3  F (36.8  C) Oral -- 16 96 %   11/12/24 0604 117/60 -- -- -- 16 97 %   11/12/24 0500 117/62 -- -- -- 16 96 %   11/12/24 0445 -- -- -- -- -- 96 %   11/12/24 0415 -- -- -- -- -- 98 %   11/12/24 0404 108/55 98.1  F (36.7  C) Oral -- 16 97 %   11/12/24 0345 -- -- -- -- -- 97 %   11/12/24 0304 108/57 -- -- -- 16 96 %   11/12/24 0230 -- -- -- -- -- 96 %   11/12/24 0215 -- -- -- -- -- 95 %   11/12/24 0200 100/57 -- -- -- 16 96 %   11/12/24 0145 -- -- -- -- -- 95 %   11/12/24 0130 -- -- -- -- -- 96 %   11/12/24 0100 102/59 -- -- -- 16 94 %   11/12/24 0030 -- -- -- -- -- 96 %   11/12/24 0015 -- -- -- -- -- 97 %   11/12/24 0001 114/55 -- -- -- 16 98 %   11/11/24 2218 121/70 -- -- -- 18 98 %   11/11/24 2200 -- -- -- -- -- 98 %   11/11/24 2147 131/74 -- -- -- 16 97 %   11/11/24 2130 -- -- -- -- -- 99 %   11/11/24 2120 -- -- -- -- -- 98 %   11/11/24 2115 -- -- -- -- -- 99 %   11/11/24 2110 -- -- -- -- -- 98 %   11/11/24 2105 -- -- -- -- -- 98 %   11/11/24 2100 134/74 -- -- -- 16 98 %   11/11/24 2055 -- -- -- -- -- 98 %   11/11/24 2050 -- -- -- -- -- 98 %   11/11/24 2045 136/72 -- -- -- 16 98 %   11/11/24 2040 -- -- -- -- -- 98 %   11/11/24 2035 -- -- -- -- -- 99 %   11/11/24 2030 -- -- -- -- -- 99 %   11/11/24 2025 -- -- -- -- -- 99 %   11/11/24 2020 -- -- -- -- -- 99 %   11/11/24 2015 131/70 -- -- -- -- 98 %   11/11/24 2010 -- -- -- -- -- 99 %   11/11/24 1959 124/70 98.2  F (36.8  C) Oral -- -- 99 %   11/11/24 1950 130/76 -- -- -- -- 98 %   11/11/24 1940 131/72 -- -- -- -- 98 %   11/11/24 1930 126/72 -- -- -- -- 99 %   11/11/24 1920 137/82 -- -- -- -- --   11/11/24 1910 133/72 -- -- -- -- 99 %   11/11/24 1855 (!) 142/72 -- -- -- -- 99 %   11/11/24 1846  132/69 -- -- -- -- --   11/11/24 1819 (!) 163/84 -- -- -- -- --   11/11/24 1817 (!) 170/133 -- -- -- -- --   11/11/24 1810 138/82 -- -- -- -- 97 %   11/11/24 1800 -- -- -- -- -- 98 %   11/11/24 1755 137/81 -- -- -- -- 98 %   11/11/24 1750 -- -- -- -- -- 97 %   11/11/24 1745 133/77 -- -- -- -- 98 %   11/11/24 1741 125/71 -- -- -- -- 98 %   11/11/24 1740 -- -- -- -- -- 98 %   11/11/24 1736 134/72 -- -- -- -- --   11/11/24 1733 -- -- -- -- -- 97 %   11/11/24 1731 (!) 145/74 -- -- -- -- --   11/11/24 1728 -- -- -- -- -- 97 %   11/11/24 1726 132/79 -- -- -- -- --   11/11/24 1721 (!) 158/77 -- -- -- -- --   11/11/24 1716 (!) 177/86 -- -- -- -- --   11/11/24 1700 (!) 167/90 -- -- -- -- --   11/11/24 1645 (!) 158/89 -- -- -- -- --   11/11/24 1628 (!) 178/99 -- -- -- -- --   11/11/24 1615 -- -- -- -- -- 99 %   11/11/24 1610 -- -- -- -- -- 98 %   11/11/24 1607 (!) 168/92 98  F (36.7  C) Oral 66 18 --     Wt Readings from Last 4 Encounters:   10/25/24 87.1 kg (192 lb)   10/24/24 87.1 kg (192 lb)   10/09/24 84.8 kg (187 lb)   09/26/24 83 kg (183 lb)     Gen:  Resting comfortably, NAD  CV:  RRR  Pulm:  NWOB, CTAB  Abd:  Soft, non-tender, gravid  Ext:  Non-tender, 2+ to 3+ LE edema b/l  Neuro: 1+ brachioradialis reflexes, 1 beat clonus bilaterally    UOP: 850 mL over 6 hours --> 1.6ml/kg/hr     PreE Labs:  Recent Labs   Lab Test 11/11/24  1637 11/03/24  0046 11/02/24  1113   HGB 13.5 12.2 13.1     Recent Labs   Lab Test 11/11/24  1637 11/03/24  0046 11/02/24  1113    224 242     Recent Labs   Lab Test 11/11/24  1637 11/03/24  0046 11/02/24  1113   ALT 26 58* 64*     Recent Labs   Lab Test 11/11/24  1637 11/03/24  0046 11/02/24  1113   AST 33 41 43     Recent Labs   Lab Test 11/11/24  1637 11/03/24  0046 11/02/24  1113   CR 0.67 0.62 0.68     FHT:  Baseline: 120bpm  Variability: Minimal to Moderate  Accels: Absent  Decels: One 4-5 minute prolonged decel at 0445, occasional spontaneous, isolated decels  [place]:  quiet    A/P:  Vanessa Turcios is a 29 year old  at 31w3d who is admitted for BP management in the setting of PreE w/ SF and administration of BMZ.     PreE w/ SF (BP):  - BPs initially sustained severe range, now improved to normotensive; Continue serial BP monitoring  - Continue labetalol 200 TID  - Immediate acting antihypertensives PRN for SSR BP  - Symptoms include headache, peripheral edema  - HELLP labs wnl on admission; Repeating this morning  - S/p Mag 6g load (1654) > 2g/hr; repeat Mg PRN. No s/sx of mag toxicity  - Strict I&O; urine output adequate at this time  - Weight: will weigh patient this morning for updated weight  - Continue q4h clinical mag checks, next at 1030    Regina Spann MD  Obstetrics & Gynecology, PGY-3  2024 7:08 AM    Estelita Colvin MD  Specialist in Maternal-Fetal Medicine

## 2024-11-12 NOTE — PROGRESS NOTES
Data: Patient presented to [facility]: 2024  3:53 PM.  Reason for maternal/fetal assessment is hypertension disorder of pregnancy and elevated blood pressures. Patient reports having severe range BP at home, 106s/110s . Patient denies uterine contractions, leaking of vaginal fluid/rupture of membranes, vaginal bleeding, abdominal pain, pelvic pressure, nausea, vomiting, visual disturbances, epigastric or RUQ pain, significant edema. Pt report headache Patient reports fetal movement is normal. Patient is a 31w2d .  Prenatal record reviewed. Pregnancy has been complicated by preeclampsia.    Vital signs  Severe range BP on admission . Support person is present.     Action: Verbal consent for EFM. Triage assessment completed.     Response: Patient verbalized agreement with plan. Dr. Sol contacted for further order at [phone].

## 2024-11-12 NOTE — CONSULTS
SPIRITUAL HEALTH SERVICES Consult Note  I met with Vanessa and a family member this afternoon to introduce spiritual care. She shared that despite needing to come here she is coping well at this time. I let her know that she can have staff call chaplains at any time if needed.     If needs come up please place another spiritual care consult.       Gagan Perales M.Div.  Associate

## 2024-11-12 NOTE — CONSULTS
"    Initial Psychiatric Consult   Consult date: 2024         Reason for Consult, requesting source:    Reason for Consult: Anxiety, new onset complications of pregnancy  Requesting source: Mirta Garcia    Labs and imaging reviewed.        HPI:   Vanessa Turcios is a 29 year old  female at 31.3 weeks with no past psych history and medical history of HSV-2, preeclampsia with severe features as well as fluid leakage with pre labor concerns who was admitted to [facility] 2024 with preeclampsia with severe features. Psychiatry consulted for anxiety due to complications of pregnancy.     Today she is feeling overwhelmed in light of her new diagnosis of preeclampsia with SF and admission for observation and medical management when her original birth plan included natural home birth. She reports feeling like she does have an undiagnosed anxiety disorder since childhood but it has been managed through interventions such as yoga, meditation, and acupuncture for many years. She states that she only notices her anxiety in extreme scenarios such as this, where she feels as though she is \"spiraling and panicky\" and that it can be difficult to diffuse.     She does not have a history of or any current suicidal ideation. She also does not have a history of depression, or a family history of postpartum depression. She had seen a therapist in childhood, but does not currently see one and has never been on medications for anxiety or other psychiatric conditions. She is primarily interested in an as needed medicine and possible psychotherapy.         Past Psychiatric History:   Past Diagnoses: None  Medication Trials: Hydroxyzine upon recent admission 24  Past/Current Providers:   Psychiatric Hospitalizations: None  Suicide Attempts: None  Self Injury: None  History of Trauma: No  Past Psychosis: No  Past Rachelle: No  Outpatient Programs: None  Civil Commitment: No  ECT: No    Social History     Tobacco Use    " Smoking status: Never    Smokeless tobacco: Never   Substance Use Topics    Alcohol use: Not Currently           Past Medical History:   PAST MEDICAL HISTORY:   Past Medical History:   Diagnosis Date    Acne     Development delay     Elevated liver enzymes 10/25/2024    Encounter for supervision of low-risk first pregnancy in third trimester 10/25/2024    Genital herpes 09/03/2013    Gestational HTN, third trimester 10/25/2024       PAST SURGICAL HISTORY:   Past Surgical History:   Procedure Laterality Date    NO HISTORY OF SURGERY      WISDOM TOOTH EXTRACTION      in high school             Family History:   FAMILY HISTORY:   Family History   Problem Relation Age of Onset    No Known Problems Mother     No Known Problems Father     Colon Cancer Maternal Grandmother     Diabetes Maternal Grandmother     Heart Disease Maternal Grandfather     No Known Problems Paternal Grandmother     No Known Problems Paternal Grandfather     No Known Problems Brother     No Known Problems Sister     Breast Cancer Paternal Aunt         2020    No Known Problems Other            Social History:     Current Living arrangement: with partner  Relationships: partner  Children: None currently   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           Medications:     Current Facility-Administered Medications   Medication Dose Route Frequency Provider Last Rate Last Admin    betamethasone acet & sod phos (CELESTONE) injection 12 mg  12 mg Intramuscular Q24H Regina Spann MD   12 mg at 11/11/24 1839    labetalol (NORMODYNE) tablet 200 mg  200 mg Oral Q8H [facility] Regina Spann MD   200 mg at 11/12/24 0744    sodium chloride (PF) 0.9% PF flush 3 mL  3 mL Intracatheter Q8H Regina Spann MD   3 mL at 11/11/24 1637    sodium chloride (PF) 0.9% PF flush 3 mL  3 mL Intracatheter Q8H Zana  MD Regina        valACYclovir (VALTREX) tablet 1,000 mg  1,000 mg Oral TID Regina Spann MD   1,000 mg at 11/12/24 0835              Allergies:   No Known Allergies       Labs:     Recent Results (from the past 48 hours)   AST    Collection Time: 11/11/24  4:37 PM   Result Value Ref Range    AST 33 0 - 45 U/L   ALT    Collection Time: 11/11/24  4:37 PM   Result Value Ref Range    ALT 26 0 - 50 U/L   CBC with platelets    Collection Time: 11/11/24  4:37 PM   Result Value Ref Range    WBC Count 11.3 (H) 4.0 - 11.0 10e3/uL    RBC Count 4.54 3.80 - 5.20 10e6/uL    Hemoglobin 13.5 11.7 - 15.7 g/dL    Hematocrit 37.7 35.0 - 47.0 %    MCV 83 78 - 100 fL    MCH 29.7 26.5 - 33.0 pg    MCHC 35.8 31.5 - 36.5 g/dL    RDW 11.8 10.0 - 15.0 %    Platelet Count 244 150 - 450 10e3/uL   Creatinine    Collection Time: 11/11/24  4:37 PM   Result Value Ref Range    Creatinine 0.67 0.51 - 0.95 mg/dL    GFR Estimate >90 >60 mL/min/1.73m2   Extra Serum Separator Tube (SST)    Collection Time: 11/11/24  4:37 PM   Result Value Ref Range    Hold Specimen JIC    Extra Purple Top Tube    Collection Time: 11/11/24  4:37 PM   Result Value Ref Range    Hold Specimen JIC    Adult Type and Screen    Collection Time: 11/11/24  4:37 PM   Result Value Ref Range    ABO/RH(D) AB POS     Antibody Screen Negative Negative    SPECIMEN EXPIRATION DATE [id number]    Comprehensive metabolic panel    Collection Time: 11/12/24  8:18 AM   Result Value Ref Range    Sodium 131 (L) 135 - 145 mmol/L    Potassium 4.9 3.4 - 5.3 mmol/L    Carbon Dioxide (CO2) 19 (L) 22 - 29 mmol/L    Anion Gap 12 7 - 15 mmol/L    Urea Nitrogen 19.9 6.0 - 20.0 mg/dL    Creatinine 0.81 0.51 - 0.95 mg/dL    GFR Estimate >90 >60 mL/min/1.73m2    Calcium 7.7 (L) 8.8 - 10.4 mg/dL    Chloride 100 98 - 107 mmol/L    Glucose 124 (H) 70 - 99 mg/dL    Alkaline Phosphatase 172 (H) 40 - 150 U/L    AST 25 0 - 45 U/L    ALT 25 0 - 50 U/L    Protein Total 6.1 (L) 6.4 - 8.3 g/dL    Albumin 3.2  "(L) 3.5 - 5.2 g/dL    Bilirubin Total 0.2 <=1.2 mg/dL   CBC with platelets    Collection Time: 24  8:18 AM   Result Value Ref Range    WBC Count 14.5 (H) 4.0 - 11.0 10e3/uL    RBC Count 4.32 3.80 - 5.20 10e6/uL    Hemoglobin 12.9 11.7 - 15.7 g/dL    Hematocrit 36.5 35.0 - 47.0 %    MCV 85 78 - 100 fL    MCH 29.9 26.5 - 33.0 pg    MCHC 35.3 31.5 - 36.5 g/dL    RDW 11.9 10.0 - 15.0 %    Platelet Count 205 150 - 450 10e3/uL          Physical and Psychiatric Examination:     /72   Pulse 71   Temp 98.4  F (36.9  C) (Oral)   Resp 16   LMP 04/10/2024   SpO2 98%   Weight is 0 lbs 0 oz  There is no height or weight on file to calculate BMI.    Physical Exam:  I have reviewed the physical exam as documented by by the medical team and agree with findings and assessment and have no additional findings to add at this time.         MSE:   Appearance: awake, alert and adequately groomed, laying in hospital bed, currently in gown  Attitude:  cooperative  Eye Contact:  good  Mood:  \"overwhelmed\"  Affect:  mood congruent and intensity is normal  Speech:  clear, coherent  Psychomotor Behavior:  no evidence of tardive dyskinesia, dystonia, or tics  Muscle strength and tone: grossly intact  Thought Process:  logical and linear  Associations:  no loose associations  Thought Content:  no evidence of suicidal ideation or homicidal ideation, no evidence of psychotic thought, no auditory hallucinations present, and no visual hallucinations present  Perceptions: No hallucinations   Insight:  good  Judgement:  intact  Oriented to:   Person, place, time, situation  Attention Span and Concentration:  intact  Recent and Remote Memory:  intact     EKG: No recent          DSM-5 Diagnosis:     Adjustment Disorder with anxious features   R/o BRITTANY          Assessment:   Vanessa Turcios is a 29 year old  female at 31.3 weeks with no past psychiatric history and medical history of HSV-2, preeclampsia with severe features as well as " fluid leakage with pre labor concerns who was admitted to [facility] 2024 with preeclampsia with severe features and increased anxiety. Psychiatry consulted for anxiety due to complications of pregnancy.     She continues to utilize her current coping mechanisms and stress reduction tools such as meditation, breathing exercises, and acupuncture. Currently her discharge date is unknown, and will hopefully be until early December when she reaches 34 weeks and they can induce. She does not have interest in a long term anxiety medication because she feels it is well controlled outside of the hospital . However, she is interested in continuing a PRN medication for anxiety and sleep (currently Hydroxyzine) while admitted. She is also interested in talk therapy and would be agreeable to meeting with the peripartum psych specialist while admitted as well.            Summary of Recommendations:   Legal: Voluntary   Safety: No 1:1 required  Labs/Studies: None added  Medications: Continue with PRN Hydroxyzine as needed.   Follow-Up: Will try to arrange visit with  psychologist     Kisha uG, MS3    I was present with the medical student who participated in the service and in the documentation of the services provided. I have verified the history and personally performed the physical exam and medical decision making, as documented by the student/resident and edited by me     Sp Gonzales MD    Department of Psychiatry  Please Vocera if questions    Total time spent in chart review, patient interview and coordination of care; 82 minutes - all time was spent on the date of the encounter that I saw patient

## 2024-11-12 NOTE — PROGRESS NOTES
Magnesium Check  S:  Patient reports feeling okay, is feeling flushed and slightly lightheaded on Mg. Headache has improved to a 1-2/10 with tylenol. Denies vision changes, chest pain, shortness of breath, RUQ pain.    O:  Patient Vitals for the past 24 hrs:   BP Temp Temp src Pulse Resp SpO2   11/12/24 0001 114/55 -- -- -- -- 98 %   11/11/24 2218 121/70 -- -- -- -- 98 %   11/11/24 2200 -- -- -- -- -- 98 %   11/11/24 2147 131/74 -- -- -- -- 97 %   11/11/24 2130 -- -- -- -- -- 99 %   11/11/24 2120 -- -- -- -- -- 98 %   11/11/24 2115 -- -- -- -- -- 99 %   11/11/24 2110 -- -- -- -- -- 98 %   11/11/24 2105 -- -- -- -- -- 98 %   11/11/24 2100 134/74 -- -- -- -- 98 %   11/11/24 2055 -- -- -- -- -- 98 %   11/11/24 2050 -- -- -- -- -- 98 %   11/11/24 2045 136/72 -- -- -- -- 98 %   11/11/24 2040 -- -- -- -- -- 98 %   11/11/24 2035 -- -- -- -- -- 99 %   11/11/24 2030 -- -- -- -- -- 99 %   11/11/24 2025 -- -- -- -- -- 99 %   11/11/24 2020 -- -- -- -- -- 99 %   11/11/24 2015 131/70 -- -- -- -- 98 %   11/11/24 2010 -- -- -- -- -- 99 %   11/11/24 1959 124/70 -- -- -- -- 99 %   11/11/24 1950 130/76 -- -- -- -- 98 %   11/11/24 1940 131/72 -- -- -- -- 98 %   11/11/24 1930 126/72 -- -- -- -- 99 %   11/11/24 1920 137/82 -- -- -- -- --   11/11/24 1910 133/72 -- -- -- -- 99 %   11/11/24 1855 (!) 142/72 -- -- -- -- 99 %   11/11/24 1846 132/69 -- -- -- -- --   11/11/24 1819 (!) 163/84 -- -- -- -- --   11/11/24 1817 (!) 170/133 -- -- -- -- --   11/11/24 1810 138/82 -- -- -- -- 97 %   11/11/24 1800 -- -- -- -- -- 98 %   11/11/24 1755 137/81 -- -- -- -- 98 %   11/11/24 1750 -- -- -- -- -- 97 %   11/11/24 1745 133/77 -- -- -- -- 98 %   11/11/24 1741 125/71 -- -- -- -- 98 %   11/11/24 1740 -- -- -- -- -- 98 %   11/11/24 1736 134/72 -- -- -- -- --   11/11/24 1733 -- -- -- -- -- 97 %   11/11/24 1731 (!) 145/74 -- -- -- -- --   11/11/24 1728 -- -- -- -- -- 97 %   11/11/24 1726 132/79 -- -- -- -- --   11/11/24 1721 (!) 158/77 -- -- -- -- --    24 1716 (!) 177/86 -- -- -- -- --   24 1700 (!) 167/90 -- -- -- -- --   24 1645 (!) 158/89 -- -- -- -- --   24 1628 (!) 178/99 -- -- -- -- --   24 1615 -- -- -- -- -- 99 %   24 1610 -- -- -- -- -- 98 %   24 1607 (!) 168/92 98  F (36.7  C) Oral 66 18 --     Wt Readings from Last 4 Encounters:   10/25/24 87.1 kg (192 lb)   10/24/24 87.1 kg (192 lb)   10/09/24 84.8 kg (187 lb)   24 83 kg (183 lb)     Gen:  Resting comfortably, NAD, notable periorbital edema  CV:  RRR  Pulm:  NWOB, CTAB  Abd:  Soft, non-tender, gravid  Ext:  Non-tender, 2+ to 3+ LE edema b/l  Neuro: 1+ brachioradialis reflexes, 1 beat clonus bilaterally    UOP: UOP unmeasured    PreE Labs:  Recent Labs   Lab Test 24  1637 24  0046 24  1113   HGB 13.5 12.2 13.1     Recent Labs   Lab Test 24  1637 24  0046 24  1113    224 242     Recent Labs   Lab Test 24  1637 24  0046 24  1113   ALT 26 58* 64*     Recent Labs   Lab Test 24  1637 24  0046 24  1113   AST 33 41 43     Recent Labs   Lab Test 24  1637 24  0046 24  1113   CR 0.67 0.62 0.68     FHT:  FHR: 120 bpm, mod variability, no accels, no decels  [place]: quiet    A/P:  Vanessa Turcios is a 29 year old  at 31w3d who is admitted for observation with PreE w/SF and administration of BMZ.    PreE w/ SF (BP):  - BPs initially sustained severe range, now improved to normotensive; Continue serial BP monitoring  - Immediate acting antihypertensives PRN for SSR BP  - Symptoms include headache, peripheral edema  - HELLP labs wnl on admission; Repeat q12h  - S/p Mag 6g load (1654) > 2g/hr; repeat Mg PRN. No s/sx of mag toxicity  - Strict I&O; no urine output measured at this time  - Weight: will weigh patient for updated weight  - Continue q4h clinical mag checks, next at 0500.     Regina Spann MD  Obstetrics & Gynecology, PGY-3  2024 12:43 AM

## 2024-11-12 NOTE — PROVIDER NOTIFICATION
11/11/24 1613   Provider Notification   Provider Name/Title Dr. Sol notified of pt arrival in triage for pre-e workup. First BP on arrival was severe   Method of Notification Electronic Page       1618: Dr. Sol asked to be notified of pt has sustained BP in 15 minutes. Pre-E order placed.   1635: Dr. Sol notified of sustained BP and treatment of BP with 20mg of labetalol at 1633.   1636: Magnesium ordered By Dr. Sol.   1654: 4g mag bolus started, pt need total of 6g. Waiting on pharmacy to send 2g/50ml  to the unit.   1705: Pt treated with 40mg of labetalol  for severe range BP within an hour of treatment.   1717:  Dr. Sol notified  pt was given 40mg of labetalol, now needing 80ml of labetalol due to sustained severe range after treatment.   1725: Pt treated with 80mg of labetalol.   1818: Pt had severe range BP within an hour of treatment. Treated with 10mg of Hydralazine at 1825.  1733: Additional 2g bolus given totaling 6g bolus over 30 minutes.   1745: Maintenance magnesium started at 2g/ hr.   1805: Dr. Spann (resident) and Dr. Sol at bedside strip reviewed, POC discussed.   1839: 1st dose of BMZ given.   [id number]: pt moved to room 458. Pt in the bathroom.   1920: EFM and TOCO plugged in by but not tracing on cental monitoring. EFM monitor tuned off and back on and all cables checked.   1926: EFM now tracing on central monitoring.   2000: Report given to Vanita Spicer RN. Care relinquished.     Emelyn Espinoza RN   11/11/24  8:00 PM

## 2024-11-12 NOTE — CONSULTS
This writer acknowledges consult for PSCU admission.  This writer attempted to meet with Vanessa but she reported she has met with multiple disciplines today and is feeling overwhelmed.  This writer is OOO tomorrow but will complete psychosocial assessment on Thursday.  Please re-consult for urgent social work needs.    Lolis VANGW, MSW, [facility]  Maternal Child Health     Call on Vocera during daytime hours  [phone]--office desk phone    After Hours Vocera Group: Ped SW After Hours On Call [phone]  Weekend Daytime Vocera Group: Peds SW Onsite Weekend [facility]

## 2024-11-12 NOTE — DISCHARGE SUMMARY
[facility] Discharge Summary    Vanessa Turcios MRN# [id number]   Age: 29 year old YOB: 1995     Date of Admission:  2024  Date of Discharge:  ***  Admitting Physician:  Delilah Sol MD  Discharge Physician:  ***     Admission Diagnosis:  - Pre-Eclampsia with SF (BP)  - Genital HSV  - Elevated liver enzymes  - Gestational HTN, third trimester    Discharge Diagnosis:  ***    Procedures:  ***    Consultations:    -  psychiatry  - Acupuncture/integrative health  - Social work  - Spiritual services   - NICU***      Medications prior to admission:  Medications Prior to Admission   Medication Sig Dispense Refill Last Dose/Taking    Prenatal Vit-Fe Fumarate-FA (PRENATAL MULTIVITAMIN  PLUS IRON) 27-1 MG TABS Take by mouth daily   2024    desoximetasone (TOPICORT) 0.25 % OINT ointment Apply topically 2 times daily as needed for inflammation.       valACYclovir (VALTREX) 500 MG tablet Take 1 tablet (500 mg) by mouth 2 times daily (Patient not taking: Reported on 2024) 6 tablet 3 Not Taking       Brief History of Presentation:    Vanessa Turcios is a 29 year old  who presented to triage at 31w2d by 6w3d US after measuring elevated blood pressures at home. Vanessa reports that she has been dealing with elevated blood pressures this pregnancy, which was first seen at 28w6d. She was initially started on Nifedipine XL 30 mg daily on 10/25, and it was subsequently raised to 60 mg on 10/30. Vanessa was seen at the [facility] office on , where she was recommended she discontinue the antihypertensives in order to assess her true blood pressures and need for inpatient vs outpatient management.     Vanessa was then seen at [facility] on 11/3, at the time with leakage of fluid and concern for pre-term labor. She was found to have pre-eclampsia with severe features at this time, by blood pressures taken >4h apart. Ultimately, she left the hospital AMA.     She  presented to triage here on  after measuring blood pressures in the 160's/100's at home. Additionally, she had a headache and edema which were concerning to her. She also endorsed prodromal symptoms of a genital HSV outbreak, but had not yet seen any lesions. She had not been taking valtrex prophylaxis at the time of admission.    Antepartum Course:  Vanessa was admitted at 31w2d with the diagnosis of pre-eclampsia with severe features by blood pressure criteria. Discussed at length with Vanessa and her  Ang the pathophysiology of preeclampsia with severe features, including the maternal risks of seizure, stroke, end organ damage, and the fetal risks associated. Reviewed recommendation for delivery by 34w at the latest, however that it is very likely delivery will be recommended and indicated prior to 34w in Vanessa's case.     Upon admission, she was given multiple doses of IV labetalol and a dose of IV hydralazine for her SR blood pressures. She was also started on IV magnesium 6g>2g/h for both maternal seizure prophylaxis and fetal neuroprotection, as well as a betamethasone course for fetal lung development. After her blood pressures normalized following the IV antihypertensives, she was started on PO labetalol 200 mg TID with initial good control of her blood pressures.    Given that Vanessa was experiencing prodromal HSV symptoms at the time of admission, it was also recommended that she take Valacyclovir therapeutic dosing, 1g TID during her hospitalization. We discussed that even in the absence of active lesions, given her prodromal symptoms it would be safest for baby to deliver via  section to mitigate the potential consequences of  HSV.     Intrapartum Course:   Vanessa Turcios is a 29 year old  at 31w6d admitted for preeclampsia with severe features by blood pressure criteria, requiring escalating doses of IV antihypertensives upon admission. She received 24 hours of magnesium  sulfate started for seizure prophylaxis and fetal neuroprotection, a full course of betamethasone for fetal lung maturity, and was started on long-acting antihypertensives which were uptitrated to labetalol 400 TID and nifedipine 30 daily. She was seen by the NICU, Spiritual Health and Acupuncture during her antepartum stay. On the morning of antepartum day 5, she developed significantly elevated, sustained severe range blood pressures refractory to IV labetalol 20/40/80mg and IV hydralazine 10mg x2, without improvement in BP.  delivery was recommended. The risks, benefits, and alternatives of  section were discussed with the patient, and she agreed to proceed. Signed informed consent was signed for  delivery and for blood transfusion if necessary.     The procedure was uncomplicated, findings below.   mL.  See operative report for details.   No subcutaneous scarring, no rectofascial adhesions, no intraabdominal adhesions, no adhesions between bladder and lower uterine segment  Notably tight rectus, fascia, peritoneum requiring sharp dissection  Clear amniotic fluid  Liveborn female infant in cephalic presentation. Born at 04221 on 11/15/24. A single loose nuchal cord, delivered through. Apgars 8 at 1 minute & 9 at 5 minutes. Weight 1640g.  Normal uterus, fallopian tubes, and ovaries.   Cord gasses below       Latest Reference Range & Units 11/15/24 05:13   Ph Cord Arterial 7.16 - 7.39  7.31   PCO2 Cord Arterial 35 - 71 mm Hg 48   PO2 Cord Arterial 10 - 33 mm Hg 17   Bicarbonate Cord Arterial 16 - 24 mmol/L 24   Base Excess/Deficit >-10.0 - -2.0 mmol/L -2.6   Ph Cord Blood Venous 7.21 - 7.45  7.33   PCO2 Cord Venous 27 - 57 mm Hg 49   PO2 Cord Venous 21 - 37 mm Hg 20 (L)   Bicarbonate Cord Venous 16 - 24 mmol/L 26 (H)   Base Excess/Deficit Cord Venous >-10.0 - -2.0 mmol/L -1.0 (H)       Postpartum Course: ***  The patient's hospital course was notable for ongoing management of preE  w/SF.***  She recovered as anticipated and experienced no post-operative complications. *** On discharge, her pain was well controlled.*** Vaginal bleeding is similar to peak menstrual flow.  Voiding without difficulty.  Ambulating well and tolerating a normal diet.  No fever or significant wound drainage.  Breastfeeding*** well.  Infant is stable.  No bowel movement yet.***  She was discharged on post-partum day #3***.    Post-partum hemoglobin: ***        Discharge Medications:     Review of your medicines        UNREVIEWED medicines. Ask your doctor about these medicines        Dose / Directions   desoximetasone 0.25 % Oint ointment  Commonly known as: TOPICORT      Apply topically 2 times daily as needed for inflammation.  Refills: 0     prenatal multivitamin  plus iron 27-1 MG Tabs      Take by mouth daily  Refills: 0     valACYclovir 500 MG tablet  Commonly known as: VALTREX      Dose: 500 mg  Take 1 tablet (500 mg) by mouth 2 times daily  Quantity: 6 tablet  Refills: 3                Discharge Instructions:  ***Ante***  Call or present to labor and delivery if you experience:   -Regular painful contractions concerning for labor   -Leakage of fluid concerning for ruptured membranes   -Decreased fetal movement   -Bright red vaginal bleeding    -Headache, vision changes, upper abdominal pain, significant increase in swelling,   generalized unwell feeling    Follow up in MFM clinic on ***    ***VD***  Vanessa Turcios was discharged to home in stable condition with the following instructions/medications:  1) Call for temperature > 100.4, bright red vaginal bleeding >1 pad an hour x 2 hours, foul smelling vaginal discharge, pain not controlled by usual oral pain meds, persistent nausea and vomiting not controlled on medications  2) She desired *** for contraception.  3) For feeding she decided to ***.  4) She was instructed to follow-up with her primary OB in 6 weeks for a routine postpartum visit and  ***.    ***CS***  Discharge diet: Regular   Discharge activity: No lifting greater than 20 lbs, pushing, pulling, or other strenuous activity for 6 weeks. Pelvic rest for 6 weeks including no sexual intercourse, tampons, or douching. No driving until you can slam on the brakes without pain or while on narcotic pain medications.    Discharge follow-up: Follow up with primary OB for routine postpartum visit in 6 weeks  ***   Wound care: Keep incision clean and dry         ***       decided to pump.  4) She was instructed to follow-up with her primary OB in 6 weeks for a routine postpartum visit and 3-5 days for BP check. Discharged home with [facility].     Discharge diet: Regular   Discharge activity: No lifting greater than 20 lbs, pushing, pulling, or other strenuous activity for 6 weeks. Pelvic rest for 6 weeks including no sexual intercourse, tampons, or douching. No driving until you can slam on the brakes without pain or while on narcotic pain medications.    Discharge follow-up: Follow up with primary OB for routine postpartum visit in 6 weeks   Wound care: Keep incision clean and dry       Tereza Lombardi MD  Obstetrics and Gynecology, PGY-2  11/19/2024 5:09 PM    OBGYN Attending Attestation    I, Sally Key, personally examined and evaluated Vanessa Turcios on 11/19/2024.  I agree with the presentation, hospital details, and plan of care this discharge summary with edits by me.     Sally Key MD  [facility], Ob/Gyn  11/21/2024 9:45 AM

## 2024-11-13 ENCOUNTER — APPOINTMENT (OUTPATIENT)
Dept: ULTRASOUND IMAGING | Facility: CLINIC | Age: 29
End: 2024-11-13
Payer: COMMERCIAL

## 2024-11-13 LAB
ALBUMIN SERPL BCG-MCNC: 3 G/DL (ref 3.5–5.2)
ALP SERPL-CCNC: 159 U/L (ref 40–150)
ALT SERPL W P-5'-P-CCNC: 24 U/L (ref 0–50)
ANION GAP SERPL CALCULATED.3IONS-SCNC: 14 MMOL/L (ref 7–15)
AST SERPL W P-5'-P-CCNC: 26 U/L (ref 0–45)
BILIRUB SERPL-MCNC: <0.2 MG/DL
BUN SERPL-MCNC: 28.5 MG/DL (ref 6–20)
CALCIUM SERPL-MCNC: 7.2 MG/DL (ref 8.8–10.4)
CHLORIDE SERPL-SCNC: 100 MMOL/L (ref 98–107)
CREAT SERPL-MCNC: 0.85 MG/DL (ref 0.51–0.95)
EGFRCR SERPLBLD CKD-EPI 2021: >90 ML/MIN/1.73M2
ERYTHROCYTE [DISTWIDTH] IN BLOOD BY AUTOMATED COUNT: 11.8 % (ref 10–15)
GLUCOSE SERPL-MCNC: 121 MG/DL (ref 70–99)
HCO3 SERPL-SCNC: 17 MMOL/L (ref 22–29)
HCT VFR BLD AUTO: 32.3 % (ref 35–47)
HGB BLD-MCNC: 11.8 G/DL (ref 11.7–15.7)
MCH RBC QN AUTO: 31 PG (ref 26.5–33)
MCHC RBC AUTO-ENTMCNC: 36.5 G/DL (ref 31.5–36.5)
MCV RBC AUTO: 85 FL (ref 78–100)
PLATELET # BLD AUTO: 207 10E3/UL (ref 150–450)
POTASSIUM SERPL-SCNC: 4.3 MMOL/L (ref 3.4–5.3)
PROT SERPL-MCNC: 5.5 G/DL (ref 6.4–8.3)
RBC # BLD AUTO: 3.81 10E6/UL (ref 3.8–5.2)
SODIUM SERPL-SCNC: 131 MMOL/L (ref 135–145)
WBC # BLD AUTO: 19.9 10E3/UL (ref 4–11)

## 2024-11-13 PROCEDURE — 76818 FETAL BIOPHYS PROFILE W/NST: CPT | Mod: 26 | Performed by: OBSTETRICS & GYNECOLOGY

## 2024-11-13 PROCEDURE — 120N000002 HC R&B MED SURG/OB UMMC

## 2024-11-13 PROCEDURE — 84075 ASSAY ALKALINE PHOSPHATASE: CPT

## 2024-11-13 PROCEDURE — 99221 1ST HOSP IP/OBS SF/LOW 40: CPT

## 2024-11-13 PROCEDURE — 85041 AUTOMATED RBC COUNT: CPT

## 2024-11-13 PROCEDURE — 250N000013 HC RX MED GY IP 250 OP 250 PS 637

## 2024-11-13 PROCEDURE — 76819 FETAL BIOPHYS PROFIL W/O NST: CPT

## 2024-11-13 PROCEDURE — 99232 SBSQ HOSP IP/OBS MODERATE 35: CPT | Mod: 25 | Performed by: OBSTETRICS & GYNECOLOGY

## 2024-11-13 PROCEDURE — 85018 HEMOGLOBIN: CPT

## 2024-11-13 PROCEDURE — 36415 COLL VENOUS BLD VENIPUNCTURE: CPT

## 2024-11-13 PROCEDURE — 76815 OB US LIMITED FETUS(S): CPT | Mod: 26 | Performed by: OBSTETRICS & GYNECOLOGY

## 2024-11-13 PROCEDURE — 80053 COMPREHEN METABOLIC PANEL: CPT

## 2024-11-13 PROCEDURE — 84155 ASSAY OF PROTEIN SERUM: CPT

## 2024-11-13 RX ORDER — LABETALOL 300 MG/1
300 TABLET, FILM COATED ORAL EVERY 8 HOURS SCHEDULED
Status: DISCONTINUED | OUTPATIENT
Start: 2024-11-13 | End: 2024-11-14

## 2024-11-13 RX ORDER — CALCIUM CARBONATE 500 MG/1
1000 TABLET, CHEWABLE ORAL 4 TIMES DAILY PRN
Status: DISCONTINUED | OUTPATIENT
Start: 2024-11-13 | End: 2024-11-19 | Stop reason: HOSPADM

## 2024-11-13 RX ORDER — MAGNESIUM GLYCINATE 100 MG
200 CAPSULE ORAL AT BEDTIME
Status: DISCONTINUED | OUTPATIENT
Start: 2024-11-13 | End: 2024-11-19 | Stop reason: HOSPADM

## 2024-11-13 RX ADMIN — LABETALOL HYDROCHLORIDE 200 MG: 200 TABLET, FILM COATED ORAL at 06:50

## 2024-11-13 RX ADMIN — LABETALOL HYDROCHLORIDE 200 MG: 200 TABLET, FILM COATED ORAL at 15:11

## 2024-11-13 RX ADMIN — ACETAMINOPHEN 975 MG: 325 TABLET, FILM COATED ORAL at 18:19

## 2024-11-13 RX ADMIN — HYDROXYZINE HYDROCHLORIDE 100 MG: 50 TABLET, FILM COATED ORAL at 23:57

## 2024-11-13 RX ADMIN — VALACYCLOVIR 1000 MG: 500 TABLET, FILM COATED ORAL at 13:00

## 2024-11-13 RX ADMIN — VALACYCLOVIR 1000 MG: 500 TABLET, FILM COATED ORAL at 08:31

## 2024-11-13 RX ADMIN — Medication 200 MG: at 22:34

## 2024-11-13 RX ADMIN — VALACYCLOVIR 1000 MG: 500 TABLET, FILM COATED ORAL at 18:19

## 2024-11-13 RX ADMIN — LABETALOL HYDROCHLORIDE 300 MG: 300 TABLET, FILM COATED ORAL at 23:20

## 2024-11-13 NOTE — PLAN OF CARE
Pt is afebrile and other VSS. Off continuous fetal monitoring at 1920 per Dr. Regina Spann. Denies headache, visual changes, or RUQ pain. Also denies vaginal bleeding, LOF, cramping/contractions. Endorses positive fetal movement. Says she is feeling a lot better now that the magnesium is off. She was able to eat dinner and shower this evening. Spouse and other family member at bedside. Report given to Vanita Spicer RN to continue cares.

## 2024-11-13 NOTE — PROGRESS NOTES
Maternal-Fetal Medicine   Antepartum Progress Note    Subjective   Vanessa is feeling much better today. Currently out of bed and eating banana bread. It feels much better to be off the magnesium. Her headache is currently gone, feeling much better after getting sleep. She has no vision changes, chest pain, shortness of breath, or RUQ pain. She did notice some shortness of breath with using the stairs earlier today. Planning to walk around more today and do yoga and other exercises. She is also noticing more irritation of her vulva, particularly when washing her vulva with her normal soap and then with urinating, although she has not noticed it as much this morning. She is planning to meet with a Homeopathic provider who has some alternative remedies for preeclampsia.    Objective     Vitals:    11/13/24 0326 11/13/24 0647 11/13/24 0650 11/13/24 1110   BP: 126/68 125/62  135/74   BP Location: Left arm Left arm  Left arm   Patient Position: Semi-Gustafson's Semi-Gustafson's  Semi-Gustafson's   Cuff Size: Adult Regular Adult Regular  Adult Regular   Pulse:   67 67   Resp: 16 16  16   Temp: 98  F (36.7  C) 98.3  F (36.8  C)  98.3  F (36.8  C)   TempSrc: Oral Oral  Oral   SpO2:    99%   Weight:           I/O last 3 completed shifts:  In: 3874 [P.O.:1360; I.V.:2514]  Out: 1000 [Urine:1000]    Gen: Resting comfortably on sofa, NAD  CV: Regular rate  Resp: no increased work of breathing  Abd: Gravid  Ext: non-tender, 1+ LE edema in bilateral feet    FHT: 120 bpm, moderate variability, no accelerations, no decelerations  [place]: quiet  Impression: non-reactive    BPP 6/10 (-2 for breathing)    Labs:   Latest Reference Range & Units 11/13/24 07:13   Sodium 135 - 145 mmol/L 131 (L)   Potassium 3.4 - 5.3 mmol/L 4.3   Chloride 98 - 107 mmol/L 100   Carbon Dioxide (CO2) 22 - 29 mmol/L 17 (L)   Urea Nitrogen 6.0 - 20.0 mg/dL 28.5 (H)   Creatinine 0.51 - 0.95 mg/dL 0.85   GFR Estimate >60 mL/min/1.73m2 >90   Calcium 8.8 - 10.4 mg/dL 7.2 (L)    Anion Gap 7 - 15 mmol/L 14   Albumin 3.5 - 5.2 g/dL 3.0 (L)   Protein Total 6.4 - 8.3 g/dL 5.5 (L)   Alkaline Phosphatase 40 - 150 U/L 159 (H)   ALT 0 - 50 U/L 24   AST 0 - 45 U/L 26   Bilirubin Total <=1.2 mg/dL <0.2   Glucose 70 - 99 mg/dL 121 (H)   WBC 4.0 - 11.0 10e3/uL 19.9 (H)   Hemoglobin 11.7 - 15.7 g/dL 11.8   Hematocrit 35.0 - 47.0 % 32.3 (L)   Platelet Count 150 - 450 10e3/uL 207   RBC Count 3.80 - 5.20 10e6/uL 3.81   MCV 78 - 100 fL 85   MCH 26.5 - 33.0 pg 31.0   MCHC 31.5 - 36.5 g/dL 36.5   RDW 10.0 - 15.0 % 11.8   (L): Data is abnormally low  (H): Data is abnormally high    Assessment/Plan   Vanessa Turcios is a 29 year old  @ 31w4d  by 6w3d US, on HD#3 admitted for pre-eclampsia with severe features by blood pressure. She initially required multiple doses of IV antihypertensives but has been normotensive and stable for >24 hours at this point. Has received course of betamethasone and 24 hours of IV magnesium. Currently expectantly managing with goal of delivery of 34 weeks. Will continue with interdisciplinary team management while inpatient with  psychology, social work, acupuncture and spiritual services.     #PreE with severe features (BP)   Diagnosed previously by sustained severe range blood pressures and initially managed outpatient per patient preference; now inpatient with goal of delivery of 34 weeks unless earlier indicated. Currently stable and has not required IV antihypertensives for >36 hours and remains asymptomatic with resolution of her headache.  - PreE labs wnl on admission and again this morning  - S/p IV labetalol 20/40/80 (1705, ), IV hydralazine 10 (1825, )  - D#3 PO labetalol 200 TID  - sp 24 hour IV magnesium on admission.    #History of Elevated LFTs  Long standing history of intermittently elevated LFTs, since .   - Last elevated 11/3/24, ALT 58 and AST 41  - Most recently with normal LFTs.     #Genital HSV  Confirmed with Vanessa that she has  recently had prodromal symptoms of genital HSV, and given this we do not recommend vaginal delivery given potential consequences of  HSV. Discussed recommendation for  delivery in this setting to minimize risk. Having ongoing chafing/irritation, likely partially in setting of increased edema.  - Valacyclovir therapeutic dosing, 1g TID. Patient reports nightmares when dosing is too close to bedtime so will adjust timing accordingly.  - Bright light exam and SSE prn, although not currently indicated since planning  delivery. Will examine tomorrow if ongoing discomfort.    #Shortness of Breath  Slightly increased shortness of breath, although has had from time to time prior to admission. Likely related to atelectasis given relative immobility in early admission, but will continue to monitor and consider further evaluation given risk for pulmonary edema, etc.  - discussed using incentive spirometer throughout the day  - CXR if worsens/no improvement, consider BNP     # Inpatient Management  - up ad reinaldo  - NPO > regular diet   - SCD's   - Q72 hour CBC type/screen  - Multidisciplinary management:  psychology, social work following for support in setting of adjustments; acupuncture following for homeopathic management.     # FWB  - BID monitoring, reactive and reassuring for gestational age.  - Ultrasound surveillance plan: repeat Mariah with BPP on Friday, 11/15 (ord'd)  - s/p BMZ -  - s/p 24h IV magnesium; will resume if concern for imminent delivery.  - NICU consulted, patient ready to meet pending NICU availability.     # Routine prenatal care  - Rh positive; Antibody negative  - Rubella immune, Hepatitis B NR, Hepatitis C NR, HIV NR, RPR negative (1st trimester), GBS collected and pending  - GC/CT not yet collected  - GCT wnl   - Other prenatal labs wnl   - Imaging     -- Datinw3d US    -- Anatomy: 24 (29w6d), EFW 12%, AC 22%   - Immunizations: s/p Tdap, will  discuss Flu, COVID, RSV   - Pap last  23 - NILM    - Contraception: Will discuss if delivery indicated  - Feeding: Will discuss if delivery indicated     # DVT prophylaxis  SCDs. Discussed recommendation for Lovenox 40 mg subcutaneous daily. Vanessa notes that she is increasing her activity and would like to avoid Lovenox at this time. She is open to re-discussing if she has limited mobility, as well as in the postpartum period.     # Delivery Plan:    - MOD:  delivery given prodromal HSV symptoms. Verbally consented for  section and blood transfusion but will obtain formal consent later in the day when patient is feeling more ready.  - TOD: Pending clinical course, goal of 34 weeks gestation    Clinically Significant Risk Factors         # Hyponatremia: Lowest Na = 131 mmol/L in last 2 days, will monitor as appropriate       # Hypoalbuminemia: Lowest albumin = 3 g/dL at 2024  7:13 AM, will monitor as appropriate     # Hypertension: Noted on problem list         Hypocalcemia likely in the setting of IV magnesium therapy.              Medically Ready for Discharge: Anticipated in 5+ Days    Clarke Montes MD  OB/GYN PGY-3  2024     Physician Attestation   I saw this patient with the resident and agree with the resident/fellow's findings and plan of care as documented in the note.      Key findings: 28 yo  at 31w 4d admitted with preeclampsia with severe features. Received IV antihypertensive on admission, now on labetalol 200 mg twice daily. Blood pressures have up-trended today and plan to increase labetalol to 300 mg TID this evening. S/p magnesium sulfate for neuroprotection and seizure prophylaxis, with plan to resume when delivery is more imminent. S/p BMZ on  and . Plan to reassess fetal growth on 11/15/2024 with BPP given decline in fetal growth from 45th to 12th percentile on most recent ultrasound. Preeclampsia labs normal, although creatinine increased slightly  again today (0.85 mg/dL). Continue daily preeclampsia labs at this time, and if stable tomorrow will space out to every 72 hours.    35 MINUTES SPENT BY ME on the date of service doing chart review, history, exam, documentation & further activities per the note.    I have personally reviewed the following data over the past 24 hrs:    19.9 (H)  \   11.8   / 207     131 (L) 100 28.5 (H) /  121 (H)   4.3 17 (L) 0.85 \     ALT: 24 AST: 26 AP: 159 (H) TBILI: <0.2   ALB: 3.0 (L) TOT PROTEIN: 5.5 (L) LIPASE: N/A         Estelita Colvin MD  Date of Service (when I saw the patient): 11/13/24

## 2024-11-13 NOTE — PLAN OF CARE
"Vanessa had a good day. She was able to rested comfortably throughput the day. VSS throughout the shift, afebrile. Denies visual vaginal bleeding, LOF, Contractions, disturbances and epigastric pain. Patient reports intermittent headache, that has resolved with tylenol. Magnesium sulfate Stopped at 1700. Second dose of BMZ given at 1838. See flowsheets for FHR tracing and uterine activity documentation. Continue with current plan of care. Call light within reach, pt encouraged to call Nurse with any questions or concerns.     1920: report given to ADOLFO Obrien. Care relinquished.   Emelyn Espinoza RN  11/12/24  7:34 PM    Problem: Adult Inpatient Plan of Care  Goal: Plan of Care Review  Description: The Plan of Care Review/Shift note should be completed every shift.  The Outcome Evaluation is a brief statement about your assessment that the patient is improving, declining, or no change.  This information will be displayed automatically on your shift  note.  Outcome: Progressing  Goal: Patient-Specific Goal (Individualized)  Description: You can add care plan individualizations to a care plan. Examples of Individualization might be:  \"Parent requests to be called daily at 9am for status\", \"I have a hard time hearing out of my right ear\", or \"Do not touch me to wake me up as it startles  me\".  Outcome: Progressing  Goal: Absence of Hospital-Acquired Illness or Injury  Outcome: Progressing  Intervention: Prevent Skin Injury  Recent Flowsheet Documentation  Taken 11/12/2024 0800 by Emelyn Espinoza RN  Body Position: position changed independently  Intervention: Prevent and Manage VTE (Venous Thromboembolism) Risk  Recent Flowsheet Documentation  Taken 11/12/2024 1610 by Emelyn Espinoza RN  VTE Prevention/Management: SCDs on (sequential compression devices)  Taken 11/12/2024 1155 by Emelyn Espinoza RN  VTE Prevention/Management: SCDs on (sequential compression devices)  Taken 11/12/2024 0800 by Emelyn Espinoza, " RN  VTE Prevention/Management: SCDs on (sequential compression devices)  Goal: Optimal Comfort and Wellbeing  Outcome: Progressing  Intervention: Provide Person-Centered Care  Recent Flowsheet Documentation  Taken 11/12/2024 0800 by Emelyn Espinoza RN  Trust Relationship/Rapport:   care explained   choices provided   emotional support provided   empathic listening provided   questions answered   questions encouraged   reassurance provided   thoughts/feelings acknowledged  Goal: Readiness for Transition of Care  Outcome: Progressing     Problem: Hypertensive Disorders in Pregnancy  Goal: Patient-Fetal Stabilization  Outcome: Progressing   Goal Outcome Evaluation:

## 2024-11-13 NOTE — PROGRESS NOTES
Vanessa was stable throughout the day. She did her daily exercise . Had mild range BP x2 (SBP 150s), other VSS throughout the shift, afebrile. Denies visual vaginal bleeding, LOF, Contractions, visual disturbances, and epigastric pain.  Pt is c/o pressure in her head, Tylenol given. She was  also c/o SOB with activity, lungs sound clear, spO2 is 97.  Pt states she just feels off, asked Dr. Muir and Dr. Spann to come and assess pt.   See flowsheets for FHR tracing and uterine activity documentation, Reactive NST this morning. Continue with current plan of care. Call light within reach, pt encouraged to call Nurse with any questions or concerns.       1915: Report given to ADOLFO Waldron. Care relinquished.  Emelyn Espinoza RN  11/13/24  7:23 PM

## 2024-11-13 NOTE — CONSULTS
"SPIRITUAL HEALTH SERVICES - Consult Note  Antepartum  Referral Source/Reason for Visit: Emotional support due to unexpected hospitalization     Summary and Recommendations -  Vanessa and spouse nAg engaged warmly and reflectively as I introduced self/role and provided check in.   Vanessa had been planning a home birth, and is understandably struggling with this sudden change in plans.   Both Vanessa and Ang identify feeling calmer today as they settle in after a few days of overwhelm.   Vanessa works as an acupuncturist and , both of which are practices that support her now.   We explored their deep spiritualities together.  Vanessa practices Yarsanism and Advent as related to her yoga practice, and draws upon  breathwork, the Congregation goddess Cuauhtemoc, and meditation to trust \"underneath the chaos, there's an impenetrable okayness\".   Ang was raised Shinto, though now identifies as \"spiritual\". He discussed his practices of mindfulness and presence/awareness.   Vanessa and Ang are coping well by leaning on their robust support system, reframing this time as \"nourishing\" in many ways, and taking things \"one day at a time\". They report finding it helpful to celebrate every day Vanessa's pregnancy is sustained.   They plan on naming their daughter \"Teresita Marinelli\", in homage to their mothers, their connection to the vigil, and Ang'  heritage.     Plan: Vanessa and Ang warmly welcome continued [facility] support. I will continue to follow.     Annette Block M.Div.  Associate   Pager [phone]  Reachable via Stand In available 24/7 for emergent requests/referrals, either by paging the on-call  or by entering an ASAP/STAT consult in Red e App, which will also page the on-call .    Assessment    Saw pt Vanessa Who Turcios per routine consult.    Patient/Family Understanding of Illness and Goals of Care - Ruth are adjusting to this admission, as a anderson difference from their prior goal of an unmedicated " "home birth. They appreciate the ability to exercise informed consent and choice as able, but are accepting of this hospitalization as necessary.     Distress and Loss - Vanessa is understandably still processing this change in her hopes for birth. She describes the past week as \"chaotic at times\" and reports feelings of overwhelm the past few days. She is feeling calmer today, and is adjusting well. Vanessa does miss her cat at home, and Ang is still discerning how to best support Vanessa through this.     Strengths, Coping, and Resources - Vanessa and Ang are excellent supports for one another and report being \"very blessed\" by their friends and family. 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 They are finding it helpful to celebrate every day of pregnancy, and have begun to reframe this time as \"nourishing\", with time to read, meditate, focus on one another, and prepare mentally. I affirmed their excellent coping.     Meaning, Beliefs, and Spirituality - Vanessa identifies with the Christianity and Congregation roots of her yoga practice. She is drawing upon meditation, yoga, acupuncture, and the goddess Cuauhtemoc to inspire \"impenetrable\" feelings of strength. Vanessa reflected on feeling a deep \"okayness\" at the core of herself, trusting that despite all that is happening, all will be well. Ang reflected on his sense of spirituality, and the importance of mindfulness/presence that allows him to escape a whirling mind. They have chosen to name their daughter \"Teresita Marinelli\", in honor of their mothers (\"I love you to the moon and back\" as something Vanessa and her mother say, Isis being Ang' Mom's name) and Ang'  heritage. They eagerly engaged reflective conversation and will benefit from continued  support.         "

## 2024-11-13 NOTE — PLAN OF CARE
Patient rested comfortably overnight. Afebrile. Patient had one MRBP, otherwise stable. Denies pre-e symptoms. Denies any LOF, bleeding, ctx, and pain. See flowsheets for FHR tracing and uterine activity documentation. Continue with current plan of care. Patient aware to call nursing with any questions or concerns. Call light within reach. Report given to ADOLFO Dunaway.

## 2024-11-14 LAB
ABO/RH(D): NORMAL
ALBUMIN SERPL BCG-MCNC: 2.8 G/DL (ref 3.5–5.2)
ALP SERPL-CCNC: 142 U/L (ref 40–150)
ALT SERPL W P-5'-P-CCNC: 26 U/L (ref 0–50)
ANION GAP SERPL CALCULATED.3IONS-SCNC: 12 MMOL/L (ref 7–15)
ANTIBODY SCREEN: NEGATIVE
AST SERPL W P-5'-P-CCNC: 29 U/L (ref 0–45)
BILIRUB SERPL-MCNC: <0.2 MG/DL
BUN SERPL-MCNC: 26.3 MG/DL (ref 6–20)
CALCIUM SERPL-MCNC: 7.9 MG/DL (ref 8.8–10.4)
CHLORIDE SERPL-SCNC: 103 MMOL/L (ref 98–107)
CREAT SERPL-MCNC: 0.76 MG/DL (ref 0.51–0.95)
EGFRCR SERPLBLD CKD-EPI 2021: >90 ML/MIN/1.73M2
ERYTHROCYTE [DISTWIDTH] IN BLOOD BY AUTOMATED COUNT: 11.9 % (ref 10–15)
GLUCOSE SERPL-MCNC: 90 MG/DL (ref 70–99)
HCO3 SERPL-SCNC: 18 MMOL/L (ref 22–29)
HCT VFR BLD AUTO: 32.4 % (ref 35–47)
HGB BLD-MCNC: 11.7 G/DL (ref 11.7–15.7)
MCH RBC QN AUTO: 31.1 PG (ref 26.5–33)
MCHC RBC AUTO-ENTMCNC: 36.1 G/DL (ref 31.5–36.5)
MCV RBC AUTO: 86 FL (ref 78–100)
PLATELET # BLD AUTO: 213 10E3/UL (ref 150–450)
POTASSIUM SERPL-SCNC: 4.7 MMOL/L (ref 3.4–5.3)
PROT SERPL-MCNC: 5.4 G/DL (ref 6.4–8.3)
RBC # BLD AUTO: 3.76 10E6/UL (ref 3.8–5.2)
SODIUM SERPL-SCNC: 133 MMOL/L (ref 135–145)
SPECIMEN EXPIRATION DATE: NORMAL
WBC # BLD AUTO: 14.6 10E3/UL (ref 4–11)

## 2024-11-14 PROCEDURE — 86900 BLOOD TYPING SEROLOGIC ABO: CPT

## 2024-11-14 PROCEDURE — 84075 ASSAY ALKALINE PHOSPHATASE: CPT

## 2024-11-14 PROCEDURE — 85027 COMPLETE CBC AUTOMATED: CPT

## 2024-11-14 PROCEDURE — 250N000013 HC RX MED GY IP 250 OP 250 PS 637

## 2024-11-14 PROCEDURE — 80053 COMPREHEN METABOLIC PANEL: CPT

## 2024-11-14 PROCEDURE — 120N000002 HC R&B MED SURG/OB UMMC

## 2024-11-14 PROCEDURE — 999N000040 HC STATISTIC CONSULT NO CHARGE VASC ACCESS

## 2024-11-14 PROCEDURE — 99233 SBSQ HOSP IP/OBS HIGH 50: CPT | Mod: 25 | Performed by: OBSTETRICS & GYNECOLOGY

## 2024-11-14 PROCEDURE — 250N000011 HC RX IP 250 OP 636

## 2024-11-14 PROCEDURE — 59025 FETAL NON-STRESS TEST: CPT | Mod: 26 | Performed by: OBSTETRICS & GYNECOLOGY

## 2024-11-14 PROCEDURE — 999N000127 HC STATISTIC PERIPHERAL IV START W US GUIDANCE

## 2024-11-14 PROCEDURE — 82247 BILIRUBIN TOTAL: CPT

## 2024-11-14 PROCEDURE — 250N000013 HC RX MED GY IP 250 OP 250 PS 637: Performed by: OBSTETRICS & GYNECOLOGY

## 2024-11-14 PROCEDURE — 36415 COLL VENOUS BLD VENIPUNCTURE: CPT

## 2024-11-14 RX ORDER — HYDRALAZINE HYDROCHLORIDE 20 MG/ML
INJECTION INTRAMUSCULAR; INTRAVENOUS
Status: DISCONTINUED
Start: 2024-11-14 | End: 2024-11-14 | Stop reason: HOSPADM

## 2024-11-14 RX ORDER — NIFEDIPINE 30 MG/1
30 TABLET, EXTENDED RELEASE ORAL DAILY
Status: DISCONTINUED | OUTPATIENT
Start: 2024-11-14 | End: 2024-11-16

## 2024-11-14 RX ORDER — LABETALOL 200 MG/1
400 TABLET, FILM COATED ORAL EVERY 8 HOURS SCHEDULED
Status: DISCONTINUED | OUTPATIENT
Start: 2024-11-14 | End: 2024-11-15

## 2024-11-14 RX ADMIN — LABETALOL HYDROCHLORIDE 400 MG: 200 TABLET, FILM COATED ORAL at 06:59

## 2024-11-14 RX ADMIN — VALACYCLOVIR 1000 MG: 500 TABLET, FILM COATED ORAL at 18:26

## 2024-11-14 RX ADMIN — Medication 4 EACH: at 11:37

## 2024-11-14 RX ADMIN — ACETAMINOPHEN 975 MG: 325 TABLET, FILM COATED ORAL at 08:22

## 2024-11-14 RX ADMIN — NIFEDIPINE 30 MG: 30 TABLET, FILM COATED, EXTENDED RELEASE ORAL at 11:34

## 2024-11-14 RX ADMIN — LABETALOL HYDROCHLORIDE 400 MG: 200 TABLET, FILM COATED ORAL at 15:42

## 2024-11-14 RX ADMIN — VALACYCLOVIR 1000 MG: 500 TABLET, FILM COATED ORAL at 06:59

## 2024-11-14 RX ADMIN — VALACYCLOVIR 1000 MG: 500 TABLET, FILM COATED ORAL at 13:35

## 2024-11-14 RX ADMIN — ACETAMINOPHEN 975 MG: 325 TABLET, FILM COATED ORAL at 15:45

## 2024-11-14 RX ADMIN — HYDRALAZINE HYDROCHLORIDE 10 MG: 20 INJECTION INTRAMUSCULAR; INTRAVENOUS at 07:23

## 2024-11-14 RX ADMIN — Medication 200 MG: at 22:35

## 2024-11-14 RX ADMIN — LABETALOL HYDROCHLORIDE 400 MG: 200 TABLET, FILM COATED ORAL at 22:35

## 2024-11-14 NOTE — PLAN OF CARE
Patient rested comfortably overnight. Afebrile. Mild range BPs, all other VSS. Denies pre-e symptoms. Denies any LOF, bleeding, ctx, and pain. See flowsheets for FHR tracing and uterine activity documentation. Continue with current plan of care. Patient aware to call nursing with any questions or concerns. Call light within reach.

## 2024-11-14 NOTE — PROVIDER NOTIFICATION
11/14/24 0713   Provider Notification   Provider Name/Title Dr. Spann PGY-3   Method of Notification Electronic Page   Notification Reason Maternal Vital Sign Change     Notified provider of sustained severe range BP. RN asked if MD would like Hydralazine 10mg given vs Labetalol. MD agreed to give Hydralazine 10mg. MD requested to make patient NPO at this time.

## 2024-11-14 NOTE — CONSULTS
"Consult received for Vascular access care.  See LDA for details. For additional needs place \"Nursing to Consult for Vascular Access\" [id number] order in EPIC.  "

## 2024-11-14 NOTE — PROGRESS NOTES
Chief Complaint:     Establish care    HPI:   Patient Huseyin St is a very pleasant 62 year old male with history of type 2 diabetes with nephropathy who presents to Internal Medicine clinic today to establish care and for follow up of multiple concerns. Regarding the patient's Type 2 Diabetes with nephropathy, the patient is due for a refill of his metformin and Lantus insulin medication today. He denies any recent hypoglycemia events. He also reports that his AM blood glucose levels are still elevated in the 190's to 230's range. Patient reports that he is still eating more carbohydrates in his diet. He also usually eats a late night snack such as ice cream. Regarding the patient's HTN,  the patient is due for a refill of his lisinopril and HCTZ BP medications. He is also due for refill of his Crestor hyperlipidemia medication. He denies current chest pain, headaches, fever or chills.      Diabetes Follow-up    Patient is checking blood sugars: three times daily.   Blood sugar testing frequency justification: Frequent hypoglycemia and Uncontrolled diabetes  Results are as follows:       am - 230       lunchtime - 115 to140       suppertime -   Diabetic concerns: blood sugar frequently over 200     Symptoms of hypoglycemia (low blood sugar): none     Paresthesias (numbness or burning in feet) or sores: No   Date of last diabetic eye exam: summer of 2016    Amount of exercise or physical activity: moderate    Problems taking medications regularly: No    Medication side effects: none  Diet: regular (no restrictions)        Current Medications:     Current Outpatient Prescriptions   Medication Sig Dispense Refill     insulin glargine (LANTUS SOLOSTAR) 100 UNIT/ML injection 55 units at bedtime 12 mL 3     rosuvastatin (CRESTOR) 40 MG tablet Take 1 tablet (40 mg) by mouth daily 90 tablet 1     metFORMIN (GLUCOPHAGE-XR) 500 MG 24 hr tablet Take 4 tablets (2,000 mg) by mouth daily (with breakfast) 360 tablet 3      Brief OBGYN Progress Note    Notified by RN that patient reporting pressure in her head, new shortness of breath with activity. To room to evaluate. Vanessa shares that her shortness of breath is present with walking and gentle stretches that several days ago did not cause SOB. She also feels increasing pressure in her head, like a mild headache but more pressure than pain. Reports tingling in her head with position changes/leaning down. As she has sat and rested her SOB has improved, she is now feeling a bit better. When asked, she endorses mild acid reflux, took a Tums earlier and that improved somewhat. Reports mild congestion, no sore throat, cough. Denies chest pain, epigastric/RUQ pain. Edema feels stable to her, not worse.    BP (!) 158/83 (BP Location: Left arm, Patient Position: Semi-Gustafson's, Cuff Size: Adult Regular)   Pulse 71   Temp 98.3  F (36.8  C) (Oral)   Resp 18   Wt 90.9 kg (200 lb 8 oz)   LMP 04/10/2024   SpO2 93%   BMI 39.16 kg/m    SpO2 at bedside 97-98%  Gen: sitting up comfortably in bed  CV: RRR, no murmurs or rubs  Pulm: CTAB, no wheezes, crackles, rales  Extr: 2+ pitting edema in feet, 1+ in legs bilaterally    Plan:     SOB  Overall reassuring given she is feeling better, cardiopulmonary exam is normal, peripheral edema not worsening, SpO2 normal. However, it is difficult to ascertain whether this is due to worsening preeclampsia and possible developing pulmonary edema, or if it is secondary to other things (GERD symptoms, anxiety). Serum Cr has been gradually rising, was 0.67 on admission and is 0.85 today, which is concerning for worsening severe features. Will monitor closely and if symptoms return or there is any clinical manifestation (decreasing O2 sat, new findings on lung exam), will re-check preE labs and get a chest xray.   - Tums 1000mg QID prn  - CXR and labs if symptoms return or if SpO2 drops  - Notify MD if SpO2 < 95%  - Symptomatic management: box breathing, positional  changes    BP  Two consecutive high mild range BP today. Labetalol 200mg given at 0650 and 1511. Concerning for worsening preE, however no SSR BP values to treat at this time.   - Increasing labetalol from 200 BID to 300 TID starting with this evening's dose.   - Repeat PreE labs in morning, sooner prn  - Continue symptomatic monitoring    Regina Spann MD  Obstetrics & Gynecology, PGY-3  11/13/2024 7:47 PM     lisinopril (PRINIVIL/ZESTRIL) 40 MG tablet Take 1 tablet (40 mg) by mouth daily 90 tablet 3     hydrochlorothiazide (MICROZIDE) 12.5 MG capsule Take 1 capsule (12.5 mg) by mouth daily 90 capsule 3     escitalopram (LEXAPRO) 20 MG tablet Take 1 tablet (20 mg) by mouth daily 90 tablet 1     diltiazem (CARDIZEM CD) 240 MG 24 hr capsule Take 1 capsule (240 mg) by mouth daily 90 capsule 1     fluticasone (FLONASE) 50 MCG/ACT spray Spray 2 sprays into both nostrils daily 1 Bottle 3     insulin aspart (NOVOLOG FLEXPEN) 100 UNIT/ML injection 10 units plus sliding scale before breakfast, 10 units plus sliding scale before lunch, 10 units plus sliding scale before dinner  Sliding scale - Blood Glucose (BG) 150-200 = +3 units, 201-250 = +6 units, 251-300 = +9 units, 301-350 = +12 units, 351-400 = +15 units, >400 = +18 units and call PCP 21 mL 3     blood glucose monitoring (JOHN CONTOUR NEXT) test strip Use to test blood sugar 1 times daily or as directed. 3 Box 3     insulin pen needle (BD MARIE U/F) 32G X 4 MM Use twice daily as directed 100 each prn     blood glucose monitoring (MICROLET) lancets Use to test blood sugars daily as directed. 100 Box prn     ORDER FOR DME Auto-CPAP: Max 12 cm H2O Min 10 cm H2O  Continuous Lifetime need and heated humidity.    1 Device 0     ASPIRIN 81 MG OR TABS 1 tab po QD (Once per day) 100 3     [DISCONTINUED] insulin glargine (LANTUS SOLOSTAR) 100 UNIT/ML injection 55 units at bedtime 12 mL 0     [DISCONTINUED] lisinopril (PRINIVIL/ZESTRIL) 40 MG tablet Take 1 tablet (40 mg) by mouth daily 90 tablet 1     [DISCONTINUED] hydrochlorothiazide (MICROZIDE) 12.5 MG capsule Take 1 capsule (12.5 mg) by mouth daily 90 capsule 1     [DISCONTINUED] rosuvastatin (CRESTOR) 40 MG tablet Take 1 tablet (40 mg) by mouth daily 90 tablet 1     [DISCONTINUED] metFORMIN (GLUCOPHAGE-XR) 500 MG 24 hr tablet Take 4 tablets (2,000 mg) by mouth daily (with breakfast) 360 tablet 3         Allergies:      Allergies    Allergen Reactions     Augmentin Diarrhea     Sulfa Drugs      Unknown - reaction occurred as a child     Victoza      Skin rash     Xanax      Difficult to wean off            Past Medical History:     Past Medical History:   Diagnosis Date     Essential hypertension, benign          Past Surgical History:   No past surgical history on file.      Family Medical History:     Family History   Problem Relation Age of Onset     Adopted: Yes     DIABETES Mother      Respiratory Mother      asthma     Lipids Mother      Arthritis Mother      knee replacement     CEREBROVASCULAR DISEASE Brother      C.A.D. No family hx of      Cancer - colorectal No family hx of      Prostate Cancer No family hx of          Social History:     Social History     Social History     Marital status:      Spouse name: Estefania Laura     Number of children: 1     Years of education: N/A     Occupational History     Not on file.     Social History Main Topics     Smoking status: Former Smoker     Types: Cigars     Smokeless tobacco: Never Used      Comment: used to smoke occ cigar, never cigarettes     Alcohol use 0.0 oz/week     0 Standard drinks or equivalent per week      Comment: 3-6 drinks on occ weekend night, occ drink on weekday     Drug use: No     Sexual activity: Yes     Partners: Female     Other Topics Concern      Service No     Blood Transfusions No     Social History Narrative           Review of System:     Constitutional: Negative for fever or chills. Positive for chronic obesity.  Skin: Negative for rashes  Ears/Nose/Throat: Negative for nasal congestion, sore throat  Respiratory: No shortness of breath, dyspnea on exertion, cough, or hemoptysis  Cardiovascular: Negative for chest pain  Gastrointestinal: Negative for nausea, vomitting  Genitourinary: Negative for dysuria, hematuria  Musculoskeletal: Negative for myalgias  Neurologic: Negative for headaches  Psychiatric: Positive for  "depression  Hematologic/Lymphatic/Immunologic: Negative  Endocrine: Negative for recent hypoglycemia events. Positive for poorly controlled AM blood glucose levels at this time.  Behavioral: Negative for tobacco use       Physical Exam:   /76 (BP Location: Right arm, Cuff Size: Adult Large)  Pulse 68  Temp 98.1  F (36.7  C) (Oral)  Ht 5' 10\" (1.778 m)  Wt 248 lb (112.5 kg)  SpO2 97%  BMI 35.58 kg/m2    GENERAL: healthy, alert and no distress  EYES: eyes grossly normal to inspection, and conjunctivae and sclerae normal  HENT: Normocephalic atraumatic. Nose and mouth without ulcers or lesions  NECK: supple  RESP: lungs clear to auscultation   CV: regular rate and rhythm, normal S1 S2  LYMPH: no peripheral edema   ABDOMEN: obese  MS: no gross musculoskeletal defects noted  SKIN: no suspicious lesions or rashes  NEURO: Alert & Oriented x 3.   PSYCH: mentation appears normal, affect normal        Diagnostic Test Results:     2 months Hgb A1c, Lipid panel, Alt labs ordered today are pending at this time of dictation.      ASSESSMENT/PLAN:       (E11.21,  Z79.4) Type 2 diabetes mellitus with diabetic nephropathy, with long-term current use of insulin (H)  Comment: No recent hypoglycemia events. However, patient's diabetes is not well controlled at this time mainly due to dietary noncompliance with a low carb diabetic diet.  Plan: I have refilled the patient's insulin glargine (LANTUS SOLOSTAR) 100 UNIT/ML injection, and metFORMIN (GLUCOPHAGE-XR) 500 MG 24 hr tablet diabetes medications today. I have also ordered a repeat Hemoglobin A1c, ALT FUTURE in 2 months to reevaluate diabetes control. In the meantime, the patient is also strongly advised to improve compliance with low carb diabetes diet going forward.      (E78.5) Hyperlipidemia LDL goal <100  Comment: Patient is due for a refill of crestor medication.  Plan: I have refilled the patietn's rosuvastatin (CRESTOR) 40 MG tablet medication and ordered a lab " for Lipid panel reflex to direct LDL FUTURE in 2 months to evaluate hyperlipidemia control.      (I10) Essential hypertension, benign  Comment: Patient is due for refill of BP medications today.  Plan: I have refilled the patient's Lisinopril, HCTZ blood pressure medications today.      (E66.09) Obesity due to excess calories, unspecified obesity severity  Comment: BMI>35, with dietary noncompliance with low carb diabetic diet  Plan: I have advised patient to start a new weight loss program through diet and exercise going forward.        Follow Up Plan:     Patient is instructed to return to Internal Medicine clinic for follow-up visit in 2 months.        Kendal Wong MD  Internal Medicine  [facility]

## 2024-11-14 NOTE — CONSULTS
Neonatology Antepartum Counseling Consult:  I was asked to provide antepartum counseling for Vanessa Turcios at the request of Estelita Colvin MD secondary to pre-e with SF. Ms. Vanessa Turcios is currently 31 weeks/ 4 days gestation and has a history significant for:    Patient Active Problem List   Diagnosis    Genital herpes    Supervision of high risk pregnancy in third trimester    Elevated liver enzymes    Gestational HTN, third trimester    Encounter for triage in pregnant patient    Encounter for supervision of low-risk first pregnancy in third trimester      Betamethasone was administered on 11/11/24 & 11/12/24.   Ms. Vanessa Turcios, accompanied by her significant other, was counseled on the expected hospital course, potential risks, and outcomes associated with an infant born at this gestation. The counseling included: morbidity, mortality, initial delivery room stabilization, respiratory course, lung development, patent ductus arteriosus, retinopathy of prematurity, hyperbilirubinemia, hemodynamic support, infection (including NEC), intraventricular hemorrhage, nutrition, growth and development, and long term outcomes.  I also explained the basic four criteria for discharge: that the baby had to be free of apnea; able to maintain their body temperature; able to feed by bottle or breast well enough to; attain an adequate pattern of weight gain and growth. Parents assented to donor breast milk.     The patient had no remaining questions but was encouraged to contact the NICU via their caregivers should any arise.  Please feel free to call and thank you for involving the NICU team in the care of your patient.      Floor Time (min): 10  Face to Face Time (min): 30  Total Time (minutes): 40  More than 50% of my time was spent in direct, face to face, antepartum counseling with the above patient.    ANISH Saldaña, NNP-BC, November 13, 2024 10:02 PM

## 2024-11-14 NOTE — PROGRESS NOTES
Maternal-Fetal Medicine   Antepartum Progress Note    Subjective   aVnessa reports feeling well today. Notes that her headache is completely gone following tylenol and is no longer having shortness of breath at this time. She was short of breath last night after some light activity, but this has resolved at the time of exam. Vanessa also reports a stuffy nose, which she thinks is due to the dry air in her room. Denies any associated chest pain. Also notes that her prodromal HSV symptoms seem to be improving today and questions if she ever truly had prodromal symptoms.    Vanessa and her , Ang, wanted to ask questions about delivery today. Again discussed that delivery will most likely be via  section due to risks associated with HSV given her prodromal symptoms. Discussed birth preferences at time of  and encouraged them to make a written list of preferences to share with her delivery team.    Objective     Vitals:    24 1003 24 1032 24 1132 24 1238   BP: (!) 144/70 (!) 149/76 (!) 141/72 (!) 148/83   BP Location:       Patient Position:       Cuff Size:       Pulse:       Resp:  16 18 18   Temp:    98.7  F (37.1  C)   TempSrc:    Oral   SpO2:       Weight:   90 kg (198 lb 8 oz)        I/O last 3 completed shifts:  In: 3222 [P.O.:3216; I.V.:6]  Out: 1700 [Urine:1700]    Gen: Resting comfortably on sofa, NAD  CV: Regular rate  Resp: no increased work of breathing, lungs CTAB.  Abd: Gravid  Ext: non-tender, 1+ LE edema in bilateral feet    FHT: 120 bpm, moderate variability, no accelerations, one variable deceleration  [place]: quiet  Impression: reactive and reassuring for gestational age    Labs:   Latest Reference Range & Units 24 07:32   Sodium 135 - 145 mmol/L 133 (L)   Potassium 3.4 - 5.3 mmol/L 4.7   Chloride 98 - 107 mmol/L 103   Carbon Dioxide (CO2) 22 - 29 mmol/L 18 (L)   Urea Nitrogen 6.0 - 20.0 mg/dL 26.3 (H)   Creatinine 0.51 - 0.95 mg/dL 0.76   GFR Estimate >60  mL/min/1.73m2 >90   Calcium 8.8 - 10.4 mg/dL 7.9 (L)   Anion Gap 7 - 15 mmol/L 12   Albumin 3.5 - 5.2 g/dL 2.8 (L)   Protein Total 6.4 - 8.3 g/dL 5.4 (L)   Alkaline Phosphatase 40 - 150 U/L 142   ALT 0 - 50 U/L 26   AST 0 - 45 U/L 29   Bilirubin Total <=1.2 mg/dL <0.2   Glucose 70 - 99 mg/dL 90   WBC 4.0 - 11.0 10e3/uL 14.6 (H)   Hemoglobin 11.7 - 15.7 g/dL 11.7   Hematocrit 35.0 - 47.0 % 32.4 (L)   Platelet Count 150 - 450 10e3/uL 213   RBC Count 3.80 - 5.20 10e6/uL 3.76 (L)   MCV 78 - 100 fL 86   MCH 26.5 - 33.0 pg 31.1   MCHC 31.5 - 36.5 g/dL 36.1   RDW 10.0 - 15.0 % 11.9      Latest Reference Range & Units 24 07:32   ABO/Rh(D)  AB POS   Antibody Screen Negative  Negative   SPECIMEN EXPIRATION DATE  [id number]     Assessment/Plan   Vanessa Turcios is a 29 year old  @ 31w5d  by 6w3d US, on HD#4 admitted for pre-eclampsia with severe features by blood pressure. She initially required multiple doses of IV antihypertensives and blood pressures initially stabilized. Blood pressures again increasing starting 11/13 PM with subsequent sustained severe range BP this morning () requiring IV antihypertensives. Improvement with single dose of IV antihypertensives. Will start nifedipine 30 mg with plan to uptitrate as necessary. Labs are stable to improved this morning. Has received course of betamethasone and 24 hours of IV magnesium. Currently expectantly managing with goal of delivery of 34 weeks. Will continue with interdisciplinary team management while inpatient with  psychology, social work, acupuncture and spiritual services.     #PreE with severe features (BP)   Diagnosed previously by sustained severe range blood pressures and initially managed outpatient per patient preference; now inpatient with goal of delivery of 34 weeks unless earlier indicated. Most recently increasing blood pressures with need for IV hydralazine this morning. Will add additional long-active antihypertensive  today and continue to monitor closely. Again discussed this morning possible indications for delivery including continued worsening of blood pressures despite antihypertensives, neurologic symptoms, or pulmonary edema as contraindications to expectant management.  - PreE labs wnl on admission and again this morning with slight improvement in creatinine  - S/p IV labetalol 20/40/80 (1705, ), IV hydralazine 10 (1825, ), IV hydralazine 10 ( 0723)  - D#1 PO labetalol 400 TID  - D#1 PO Nifedipine ER 30 mg daily; titrate as needed (Nifedipine added instead of increasing labetalol further given pulse 60's)  - s/p 24 hour IV magnesium on admission.    #History of Elevated LFTs  Long standing history of intermittently elevated LFTs, since .   - Last elevated 11/3/24, ALT 58 and AST 41  - Most recently with normal LFTs.     #Genital HSV  Vanessa recently had prodromal symptoms of genital HSV, and given this we do not recommend vaginal delivery given potential consequences of  HSV. Discussed recommendation for  delivery in this setting to minimize risk. Had ongoing chafing/irritation, likely partially in setting of increased edema. Per patient, this is improving. Question if truly prodromal symptoms.  - Valacyclovir therapeutic dosing, 1g TID. Patient reports nightmares when dosing is too close to bedtime; dosing adjusted accordingly.  - Bright light exam and SSE prn if planning delivery. Mode of delivery via shared decision making. Did discuss that cannot rule out that these were prodromal symptoms and that would not typically recommend vaginal delivery if recent outbreak.    #Shortness of Breath  Shortness of breath improving at time of exam today (). Likely related to atelectasis given relative immobility in early admission, but will continue to monitor and consider further evaluation given risk for pulmonary edema, which would be a contraindication to expectant management of her  preeclampsia.  - discussed using incentive spirometer throughout the day  - CXR if low O2 saturation, worsening clinical symptoms     # Inpatient Management  - up ad reinaldo  - Regular diet. Will change to NPO if her condition worsens and delivery is imminent.  - SCD's   - Q72 hour CBC type/screen  - Multidisciplinary management:  psychology, social work following for support in setting of adjustments; acupuncture following for homeopathic management.     # FWB  - BID monitoring, reactive and reassuring for gestational age.  - Ultrasound surveillance plan: repeat Mariah with BPP on Friday, 11/15 (ord'd)  - s/p BMZ -  - s/p 24h IV magnesium; will resume if concern for imminent delivery.  - s/p NICU consult      # Routine prenatal care  - Rh positive; Antibody negative  - Rubella immune, Hepatitis B NR, Hepatitis C NR, HIV NR, RPR negative (1st trimester), GBS negative  - GC/CT not yet collected  - GCT wnl   - Other prenatal labs wnl   - Imaging     -- Datinw3d US    -- Anatomy: 24 (29w6d), EFW 12%, AC 22%   - Immunizations: s/p Tdap, will discuss Flu, COVID, RSV   - Pap last  23 - NILM    - Contraception: Will discuss if delivery indicated  - Feeding: Will discuss if delivery indicated     # DVT prophylaxis  SCDs. Discussed recommendation for Lovenox 40 mg subcutaneous daily. Vanessa notes that she is increasing her activity and would like to avoid Lovenox at this time. She is open to re-discussing if she has limited mobility, as well as in the postpartum period.     # Delivery Plan:    - MOD: To be determined with shared decision making around time of delivery given possible HSV prodromal symptoms.   - Did again discuss , what that might look like. Discussed risks and benefits of procedure, including but not limited to bleeding, infection, injury to surrounding organs, injury to infant, and the potential need for another surgery should some injury go unrecognized or patient were to  have continued bleeding. Patient had time to ask questions and expressed understanding of procedure and associated risks. Agreed to blood transfusion if necessary. Consent signed. Patient additionally consented for classical  hysterotomy given gestational age, with discussion of subsequent need for future  deliveries at 36-37 weeks if classical hysterotomy.  - Patient interested in having arms free to hold at her chest during procedure, possibly coordinating delivery of infant with her breathing. She will write her wishes down and team will help transfer these over to her medical record.  - TOD: Pending clinical course, goal of 34 weeks gestation    Clinically Significant Risk Factors         # Hyponatremia: Lowest Na = 131 mmol/L in last 2 days, will monitor as appropriate       # Hypoalbuminemia: Lowest albumin = 2.8 g/dL at 2024  7:32 AM, will monitor as appropriate     # Hypertension: Noted on problem list         Hypocalcemia likely in the setting of IV magnesium therapy    Kristy Pierce, MS4    This patient was seen and discussed with Dr. Clarke Montes, PGY-3 and Dr. Estelita Colvin.    Medically Ready for Discharge: Anticipated in 5+ Days    Clarke Montes MD  OB/GYN PGY-3  2024 3:06 PM    Physician Attestation   I saw this patient with the resident and agree with the resident/fellow's findings and plan of care as documented in the note.      Key findings: 30 yo  at 31w 5d admitted with preeclampsia with severe features. Received IV antihypertensive on admission, and was started on labetalol 200 mg twice daily. Blood pressures have been up-trending over the last 24 hours and labetalol increased to 400 mg TID. Received one dose of Hydralazine IV this am, and Nifedipine 30 mg XL started this am. S/p magnesium sulfate for neuroprotection and seizure prophylaxis, with plan to resume when delivery is more imminent. S/p BMZ on  and . Plan to reassess fetal growth with BPP  tomorrow given decline in fetal growth from 45th to 12th percentile on most recent ultrasound. Preeclampsia labs normal and creatinine improved to 0.76 (previously 0.85 mg/dL). Continue daily preeclampsia labs at this time given increased blood pressures. Reviewed indications for delivery, including inability to control blood pressures,  persistent headache not relieved by tylenol, creatinine 1.1 or greater or pulmonary edema. Will continue to monitor very closed. Delivery by  section if delivery indicated soon given recent prodromal HSV symptoms. C/S consent reviewed.    60 MINUTES SPENT BY ME on the date of service doing chart review, history, exam, documentation & further activities per the note.    I have personally reviewed the following data over the past 24 hrs:    14.6 (H)  \   11.7   / 213     133 (L) 103 26.3 (H) /  90   4.7 18 (L) 0.76 \     ALT: 26 AST: 29 AP: 142 TBILI: <0.2   ALB: 2.8 (L) TOT PROTEIN: 5.4 (L) LIPASE: N/A         Estelita Colvin MD  Date of Service (when I saw the patient): 24

## 2024-11-15 ENCOUNTER — ANESTHESIA EVENT (OUTPATIENT)
Dept: OBGYN | Facility: CLINIC | Age: 29
End: 2024-11-15
Payer: COMMERCIAL

## 2024-11-15 ENCOUNTER — ANESTHESIA (OUTPATIENT)
Dept: OBGYN | Facility: CLINIC | Age: 29
End: 2024-11-15
Payer: COMMERCIAL

## 2024-11-15 ENCOUNTER — TELEPHONE (OUTPATIENT)
Dept: OBGYN | Facility: CLINIC | Age: 29
End: 2024-11-15

## 2024-11-15 LAB
ALBUMIN SERPL BCG-MCNC: 2.7 G/DL (ref 3.5–5.2)
ALBUMIN SERPL BCG-MCNC: 2.9 G/DL (ref 3.5–5.2)
ALBUMIN SERPL BCG-MCNC: 2.9 G/DL (ref 3.5–5.2)
ALP SERPL-CCNC: 127 U/L (ref 40–150)
ALP SERPL-CCNC: 132 U/L (ref 40–150)
ALP SERPL-CCNC: 147 U/L (ref 40–150)
ALT SERPL W P-5'-P-CCNC: 47 U/L (ref 0–50)
ALT SERPL W P-5'-P-CCNC: 49 U/L (ref 0–50)
ALT SERPL W P-5'-P-CCNC: 54 U/L (ref 0–50)
ANION GAP SERPL CALCULATED.3IONS-SCNC: 10 MMOL/L (ref 7–15)
ANION GAP SERPL CALCULATED.3IONS-SCNC: 11 MMOL/L (ref 7–15)
ANION GAP SERPL CALCULATED.3IONS-SCNC: 9 MMOL/L (ref 7–15)
AST SERPL W P-5'-P-CCNC: 49 U/L (ref 0–45)
AST SERPL W P-5'-P-CCNC: 50 U/L (ref 0–45)
AST SERPL W P-5'-P-CCNC: 57 U/L (ref 0–45)
BILIRUB SERPL-MCNC: 0.2 MG/DL
BILIRUB SERPL-MCNC: <0.2 MG/DL
BILIRUB SERPL-MCNC: <0.2 MG/DL
BUN SERPL-MCNC: 28.8 MG/DL (ref 6–20)
BUN SERPL-MCNC: 31.8 MG/DL (ref 6–20)
BUN SERPL-MCNC: 33.3 MG/DL (ref 6–20)
CALCIUM SERPL-MCNC: 7.8 MG/DL (ref 8.8–10.4)
CALCIUM SERPL-MCNC: 8.5 MG/DL (ref 8.8–10.4)
CALCIUM SERPL-MCNC: 9.1 MG/DL (ref 8.8–10.4)
CHLORIDE SERPL-SCNC: 100 MMOL/L (ref 98–107)
CHLORIDE SERPL-SCNC: 102 MMOL/L (ref 98–107)
CHLORIDE SERPL-SCNC: 105 MMOL/L (ref 98–107)
CREAT SERPL-MCNC: 0.89 MG/DL (ref 0.51–0.95)
CREAT SERPL-MCNC: 0.89 MG/DL (ref 0.51–0.95)
CREAT SERPL-MCNC: 0.98 MG/DL (ref 0.51–0.95)
CREAT SERPL-MCNC: 0.98 MG/DL (ref 0.51–0.95)
EGFRCR SERPLBLD CKD-EPI 2021: 80 ML/MIN/1.73M2
EGFRCR SERPLBLD CKD-EPI 2021: 80 ML/MIN/1.73M2
EGFRCR SERPLBLD CKD-EPI 2021: 90 ML/MIN/1.73M2
EGFRCR SERPLBLD CKD-EPI 2021: 90 ML/MIN/1.73M2
ERYTHROCYTE [DISTWIDTH] IN BLOOD BY AUTOMATED COUNT: 11.9 % (ref 10–15)
ERYTHROCYTE [DISTWIDTH] IN BLOOD BY AUTOMATED COUNT: 11.9 % (ref 10–15)
ERYTHROCYTE [DISTWIDTH] IN BLOOD BY AUTOMATED COUNT: 12 % (ref 10–15)
GLUCOSE SERPL-MCNC: 111 MG/DL (ref 70–99)
GLUCOSE SERPL-MCNC: 117 MG/DL (ref 70–99)
GLUCOSE SERPL-MCNC: 97 MG/DL (ref 70–99)
HCO3 SERPL-SCNC: 18 MMOL/L (ref 22–29)
HCO3 SERPL-SCNC: 20 MMOL/L (ref 22–29)
HCO3 SERPL-SCNC: 20 MMOL/L (ref 22–29)
HCT VFR BLD AUTO: 31.6 % (ref 35–47)
HCT VFR BLD AUTO: 33.3 % (ref 35–47)
HCT VFR BLD AUTO: 35 % (ref 35–47)
HGB BLD-MCNC: 10.9 G/DL (ref 11.7–15.7)
HGB BLD-MCNC: 11.5 G/DL (ref 11.7–15.7)
HGB BLD-MCNC: 11.7 G/DL (ref 11.7–15.7)
MAGNESIUM SERPL-MCNC: 6 MG/DL (ref 1.7–2.3)
MAGNESIUM SERPL-MCNC: 6.1 MG/DL (ref 1.7–2.3)
MCH RBC QN AUTO: 29.8 PG (ref 26.5–33)
MCH RBC QN AUTO: 30 PG (ref 26.5–33)
MCH RBC QN AUTO: 30.2 PG (ref 26.5–33)
MCHC RBC AUTO-ENTMCNC: 33.4 G/DL (ref 31.5–36.5)
MCHC RBC AUTO-ENTMCNC: 34.5 G/DL (ref 31.5–36.5)
MCHC RBC AUTO-ENTMCNC: 34.5 G/DL (ref 31.5–36.5)
MCV RBC AUTO: 86 FL (ref 78–100)
MCV RBC AUTO: 88 FL (ref 78–100)
MCV RBC AUTO: 90 FL (ref 78–100)
PLATELET # BLD AUTO: 187 10E3/UL (ref 150–450)
PLATELET # BLD AUTO: 193 10E3/UL (ref 150–450)
PLATELET # BLD AUTO: 196 10E3/UL (ref 150–450)
POTASSIUM SERPL-SCNC: 4.7 MMOL/L (ref 3.4–5.3)
POTASSIUM SERPL-SCNC: 5.8 MMOL/L (ref 3.4–5.3)
POTASSIUM SERPL-SCNC: 5.9 MMOL/L (ref 3.4–5.3)
PROT SERPL-MCNC: 5.1 G/DL (ref 6.4–8.3)
PROT SERPL-MCNC: 5.2 G/DL (ref 6.4–8.3)
PROT SERPL-MCNC: 5.3 G/DL (ref 6.4–8.3)
RBC # BLD AUTO: 3.61 10E6/UL (ref 3.8–5.2)
RBC # BLD AUTO: 3.86 10E6/UL (ref 3.8–5.2)
RBC # BLD AUTO: 3.9 10E6/UL (ref 3.8–5.2)
SODIUM SERPL-SCNC: 129 MMOL/L (ref 135–145)
SODIUM SERPL-SCNC: 130 MMOL/L (ref 135–145)
SODIUM SERPL-SCNC: 136 MMOL/L (ref 135–145)
WBC # BLD AUTO: 14.2 10E3/UL (ref 4–11)
WBC # BLD AUTO: 16.8 10E3/UL (ref 4–11)
WBC # BLD AUTO: 18.9 10E3/UL (ref 4–11)

## 2024-11-15 PROCEDURE — 250N000013 HC RX MED GY IP 250 OP 250 PS 637

## 2024-11-15 PROCEDURE — 83735 ASSAY OF MAGNESIUM: CPT

## 2024-11-15 PROCEDURE — 88307 TISSUE EXAM BY PATHOLOGIST: CPT | Mod: 26 | Performed by: PATHOLOGY

## 2024-11-15 PROCEDURE — 84460 ALANINE AMINO (ALT) (SGPT): CPT

## 2024-11-15 PROCEDURE — 272N000001 HC OR GENERAL SUPPLY STERILE: Performed by: OBSTETRICS & GYNECOLOGY

## 2024-11-15 PROCEDURE — 82247 BILIRUBIN TOTAL: CPT

## 2024-11-15 PROCEDURE — 36415 COLL VENOUS BLD VENIPUNCTURE: CPT

## 2024-11-15 PROCEDURE — 258N000003 HC RX IP 258 OP 636

## 2024-11-15 PROCEDURE — 88307 TISSUE EXAM BY PATHOLOGIST: CPT | Mod: TC

## 2024-11-15 PROCEDURE — 85048 AUTOMATED LEUKOCYTE COUNT: CPT

## 2024-11-15 PROCEDURE — 271N000001 HC OR GENERAL SUPPLY NON-STERILE: Performed by: OBSTETRICS & GYNECOLOGY

## 2024-11-15 PROCEDURE — 59514 CESAREAN DELIVERY ONLY: CPT | Performed by: OBSTETRICS & GYNECOLOGY

## 2024-11-15 PROCEDURE — 93010 ELECTROCARDIOGRAM REPORT: CPT | Performed by: INTERNAL MEDICINE

## 2024-11-15 PROCEDURE — 250N000011 HC RX IP 250 OP 636

## 2024-11-15 PROCEDURE — 370N000017 HC ANESTHESIA TECHNICAL FEE, PER MIN: Performed by: OBSTETRICS & GYNECOLOGY

## 2024-11-15 PROCEDURE — 82040 ASSAY OF SERUM ALBUMIN: CPT

## 2024-11-15 PROCEDURE — 999N000141 HC STATISTIC PRE-PROCEDURE NURSING ASSESSMENT: Performed by: OBSTETRICS & GYNECOLOGY

## 2024-11-15 PROCEDURE — 250N000011 HC RX IP 250 OP 636: Performed by: STUDENT IN AN ORGANIZED HEALTH CARE EDUCATION/TRAINING PROGRAM

## 2024-11-15 PROCEDURE — 85014 HEMATOCRIT: CPT

## 2024-11-15 PROCEDURE — 258N000003 HC RX IP 258 OP 636: Performed by: STUDENT IN AN ORGANIZED HEALTH CARE EDUCATION/TRAINING PROGRAM

## 2024-11-15 PROCEDURE — C9290 INJ, BUPIVACAINE LIPOSOME: HCPCS | Performed by: ANESTHESIOLOGY

## 2024-11-15 PROCEDURE — 99233 SBSQ HOSP IP/OBS HIGH 50: CPT | Mod: GC | Performed by: STUDENT IN AN ORGANIZED HEALTH CARE EDUCATION/TRAINING PROGRAM

## 2024-11-15 PROCEDURE — 999N000127 HC STATISTIC PERIPHERAL IV START W US GUIDANCE

## 2024-11-15 PROCEDURE — 84155 ASSAY OF PROTEIN SERUM: CPT

## 2024-11-15 PROCEDURE — 250N000011 HC RX IP 250 OP 636: Performed by: OBSTETRICS & GYNECOLOGY

## 2024-11-15 PROCEDURE — 85018 HEMOGLOBIN: CPT

## 2024-11-15 PROCEDURE — 250N000011 HC RX IP 250 OP 636: Performed by: ANESTHESIOLOGY

## 2024-11-15 PROCEDURE — 250N000009 HC RX 250: Performed by: STUDENT IN AN ORGANIZED HEALTH CARE EDUCATION/TRAINING PROGRAM

## 2024-11-15 PROCEDURE — 710N000010 HC RECOVERY PHASE 1, LEVEL 2, PER MIN: Performed by: OBSTETRICS & GYNECOLOGY

## 2024-11-15 PROCEDURE — 120N000002 HC R&B MED SURG/OB UMMC

## 2024-11-15 PROCEDURE — 360N000076 HC SURGERY LEVEL 3, PER MIN: Performed by: OBSTETRICS & GYNECOLOGY

## 2024-11-15 PROCEDURE — 80053 COMPREHEN METABOLIC PANEL: CPT

## 2024-11-15 RX ORDER — CEFAZOLIN SODIUM/WATER 2 G/20 ML
2 SYRINGE (ML) INTRAVENOUS SEE ADMIN INSTRUCTIONS
Status: DISCONTINUED | OUTPATIENT
Start: 2024-11-15 | End: 2024-11-15 | Stop reason: HOSPADM

## 2024-11-15 RX ORDER — OXYTOCIN/0.9 % SODIUM CHLORIDE 30/500 ML
340 PLASTIC BAG, INJECTION (ML) INTRAVENOUS CONTINUOUS PRN
Status: DISCONTINUED | OUTPATIENT
Start: 2024-11-15 | End: 2024-11-19 | Stop reason: HOSPADM

## 2024-11-15 RX ORDER — KETOROLAC TROMETHAMINE 30 MG/ML
INJECTION, SOLUTION INTRAMUSCULAR; INTRAVENOUS PRN
Status: DISCONTINUED | OUTPATIENT
Start: 2024-11-15 | End: 2024-11-15

## 2024-11-15 RX ORDER — SODIUM CHLORIDE 9 MG/ML
INJECTION, SOLUTION INTRAVENOUS
Status: DISPENSED
Start: 2024-11-15 | End: 2024-11-16

## 2024-11-15 RX ORDER — OXYTOCIN 10 [USP'U]/ML
10 INJECTION, SOLUTION INTRAMUSCULAR; INTRAVENOUS
Status: DISCONTINUED | OUTPATIENT
Start: 2024-11-15 | End: 2024-11-15

## 2024-11-15 RX ORDER — AMOXICILLIN 250 MG
1 CAPSULE ORAL 2 TIMES DAILY
Status: DISCONTINUED | OUTPATIENT
Start: 2024-11-15 | End: 2024-11-19 | Stop reason: HOSPADM

## 2024-11-15 RX ORDER — MAGNESIUM SULFATE HEPTAHYDRATE 40 MG/ML
2 INJECTION, SOLUTION INTRAVENOUS
Status: DISCONTINUED | OUTPATIENT
Start: 2024-11-15 | End: 2024-11-19 | Stop reason: HOSPADM

## 2024-11-15 RX ORDER — ONDANSETRON 2 MG/ML
4 INJECTION INTRAMUSCULAR; INTRAVENOUS EVERY 6 HOURS PRN
Status: DISCONTINUED | OUTPATIENT
Start: 2024-11-15 | End: 2024-11-19 | Stop reason: HOSPADM

## 2024-11-15 RX ORDER — MISOPROSTOL 200 UG/1
800 TABLET ORAL
Status: DISCONTINUED | OUTPATIENT
Start: 2024-11-15 | End: 2024-11-19 | Stop reason: HOSPADM

## 2024-11-15 RX ORDER — NALOXONE HYDROCHLORIDE 0.4 MG/ML
0.2 INJECTION, SOLUTION INTRAMUSCULAR; INTRAVENOUS; SUBCUTANEOUS
Status: DISCONTINUED | OUTPATIENT
Start: 2024-11-15 | End: 2024-11-19 | Stop reason: HOSPADM

## 2024-11-15 RX ORDER — ONDANSETRON 4 MG/1
4 TABLET, ORALLY DISINTEGRATING ORAL EVERY 6 HOURS PRN
Status: DISCONTINUED | OUTPATIENT
Start: 2024-11-15 | End: 2024-11-15

## 2024-11-15 RX ORDER — NIFEDIPINE 30 MG/1
60 TABLET, EXTENDED RELEASE ORAL EVERY EVENING
Status: DISCONTINUED | OUTPATIENT
Start: 2024-11-16 | End: 2024-11-16

## 2024-11-15 RX ORDER — BUPIVACAINE HYDROCHLORIDE 2.5 MG/ML
INJECTION, SOLUTION EPIDURAL; INFILTRATION; INTRACAUDAL
Status: COMPLETED | OUTPATIENT
Start: 2024-11-15 | End: 2024-11-15

## 2024-11-15 RX ORDER — CITRIC ACID/SODIUM CITRATE 334-500MG
30 SOLUTION, ORAL ORAL
Status: DISCONTINUED | OUTPATIENT
Start: 2024-11-15 | End: 2024-11-15 | Stop reason: HOSPADM

## 2024-11-15 RX ORDER — HYDRALAZINE HYDROCHLORIDE 20 MG/ML
INJECTION INTRAMUSCULAR; INTRAVENOUS
Status: DISCONTINUED
Start: 2024-11-15 | End: 2024-11-15 | Stop reason: HOSPADM

## 2024-11-15 RX ORDER — HYDROCORTISONE 25 MG/G
CREAM TOPICAL 3 TIMES DAILY PRN
Status: DISCONTINUED | OUTPATIENT
Start: 2024-11-15 | End: 2024-11-19 | Stop reason: HOSPADM

## 2024-11-15 RX ORDER — NALOXONE HYDROCHLORIDE 0.4 MG/ML
0.4 INJECTION, SOLUTION INTRAMUSCULAR; INTRAVENOUS; SUBCUTANEOUS
Status: DISCONTINUED | OUTPATIENT
Start: 2024-11-15 | End: 2024-11-19 | Stop reason: HOSPADM

## 2024-11-15 RX ORDER — PROCHLORPERAZINE MALEATE 10 MG
10 TABLET ORAL EVERY 6 HOURS PRN
Status: DISCONTINUED | OUTPATIENT
Start: 2024-11-15 | End: 2024-11-19 | Stop reason: HOSPADM

## 2024-11-15 RX ORDER — CEFAZOLIN SODIUM/WATER 2 G/20 ML
2 SYRINGE (ML) INTRAVENOUS
Status: DISCONTINUED | OUTPATIENT
Start: 2024-11-15 | End: 2024-11-15 | Stop reason: HOSPADM

## 2024-11-15 RX ORDER — METOCLOPRAMIDE 10 MG/1
10 TABLET ORAL EVERY 6 HOURS PRN
Status: DISCONTINUED | OUTPATIENT
Start: 2024-11-15 | End: 2024-11-19 | Stop reason: HOSPADM

## 2024-11-15 RX ORDER — SODIUM CHLORIDE, SODIUM LACTATE, POTASSIUM CHLORIDE, CALCIUM CHLORIDE 600; 310; 30; 20 MG/100ML; MG/100ML; MG/100ML; MG/100ML
INJECTION, SOLUTION INTRAVENOUS
Status: DISPENSED
Start: 2024-11-15 | End: 2024-11-15

## 2024-11-15 RX ORDER — PROCHLORPERAZINE MALEATE 10 MG
10 TABLET ORAL EVERY 6 HOURS PRN
Status: DISCONTINUED | OUTPATIENT
Start: 2024-11-15 | End: 2024-11-15

## 2024-11-15 RX ORDER — SODIUM CHLORIDE, SODIUM LACTATE, POTASSIUM CHLORIDE, CALCIUM CHLORIDE 600; 310; 30; 20 MG/100ML; MG/100ML; MG/100ML; MG/100ML
INJECTION, SOLUTION INTRAVENOUS CONTINUOUS
Status: DISCONTINUED | OUTPATIENT
Start: 2024-11-15 | End: 2024-11-15 | Stop reason: HOSPADM

## 2024-11-15 RX ORDER — TRANEXAMIC ACID 10 MG/ML
1 INJECTION, SOLUTION INTRAVENOUS EVERY 30 MIN PRN
Status: DISCONTINUED | OUTPATIENT
Start: 2024-11-15 | End: 2024-11-15 | Stop reason: HOSPADM

## 2024-11-15 RX ORDER — METHYLERGONOVINE MALEATE 0.2 MG/ML
200 INJECTION INTRAVENOUS
Status: DISCONTINUED | OUTPATIENT
Start: 2024-11-15 | End: 2024-11-19 | Stop reason: HOSPADM

## 2024-11-15 RX ORDER — CARBOPROST TROMETHAMINE 250 UG/ML
250 INJECTION, SOLUTION INTRAMUSCULAR
Status: DISCONTINUED | OUTPATIENT
Start: 2024-11-15 | End: 2024-11-19 | Stop reason: HOSPADM

## 2024-11-15 RX ORDER — FUROSEMIDE 10 MG/ML
20 INJECTION INTRAMUSCULAR; INTRAVENOUS ONCE
Status: COMPLETED | OUTPATIENT
Start: 2024-11-15 | End: 2024-11-15

## 2024-11-15 RX ORDER — TRANEXAMIC ACID 10 MG/ML
1 INJECTION, SOLUTION INTRAVENOUS EVERY 30 MIN PRN
Status: DISCONTINUED | OUTPATIENT
Start: 2024-11-15 | End: 2024-11-19 | Stop reason: HOSPADM

## 2024-11-15 RX ORDER — AMOXICILLIN 250 MG
2 CAPSULE ORAL 2 TIMES DAILY
Status: DISCONTINUED | OUTPATIENT
Start: 2024-11-15 | End: 2024-11-19 | Stop reason: HOSPADM

## 2024-11-15 RX ORDER — SODIUM CHLORIDE, SODIUM LACTATE, POTASSIUM CHLORIDE, CALCIUM CHLORIDE 600; 310; 30; 20 MG/100ML; MG/100ML; MG/100ML; MG/100ML
10-125 INJECTION, SOLUTION INTRAVENOUS CONTINUOUS
Status: DISCONTINUED | OUTPATIENT
Start: 2024-11-15 | End: 2024-11-15

## 2024-11-15 RX ORDER — MISOPROSTOL 200 UG/1
800 TABLET ORAL
Status: DISCONTINUED | OUTPATIENT
Start: 2024-11-15 | End: 2024-11-15 | Stop reason: HOSPADM

## 2024-11-15 RX ORDER — OXYTOCIN 10 [USP'U]/ML
10 INJECTION, SOLUTION INTRAMUSCULAR; INTRAVENOUS
Status: DISCONTINUED | OUTPATIENT
Start: 2024-11-15 | End: 2024-11-15 | Stop reason: HOSPADM

## 2024-11-15 RX ORDER — BISACODYL 10 MG
10 SUPPOSITORY, RECTAL RECTAL DAILY PRN
Status: DISCONTINUED | OUTPATIENT
Start: 2024-11-17 | End: 2024-11-19 | Stop reason: HOSPADM

## 2024-11-15 RX ORDER — METOCLOPRAMIDE HYDROCHLORIDE 5 MG/ML
10 INJECTION INTRAMUSCULAR; INTRAVENOUS EVERY 6 HOURS PRN
Status: DISCONTINUED | OUTPATIENT
Start: 2024-11-15 | End: 2024-11-15

## 2024-11-15 RX ORDER — LOPERAMIDE HYDROCHLORIDE 2 MG/1
2 CAPSULE ORAL
Status: DISCONTINUED | OUTPATIENT
Start: 2024-11-15 | End: 2024-11-15 | Stop reason: HOSPADM

## 2024-11-15 RX ORDER — MORPHINE SULFATE 0.5 MG/ML
150 INJECTION, SOLUTION EPIDURAL; INTRATHECAL; INTRAVENOUS ONCE
Status: DISCONTINUED | OUTPATIENT
Start: 2024-11-15 | End: 2024-11-15

## 2024-11-15 RX ORDER — ONDANSETRON 4 MG/1
4 TABLET, ORALLY DISINTEGRATING ORAL EVERY 6 HOURS PRN
Status: DISCONTINUED | OUTPATIENT
Start: 2024-11-15 | End: 2024-11-19 | Stop reason: HOSPADM

## 2024-11-15 RX ORDER — OXYTOCIN/0.9 % SODIUM CHLORIDE 30/500 ML
100-340 PLASTIC BAG, INJECTION (ML) INTRAVENOUS CONTINUOUS PRN
Status: DISCONTINUED | OUTPATIENT
Start: 2024-11-15 | End: 2024-11-15

## 2024-11-15 RX ORDER — ONDANSETRON 2 MG/ML
4 INJECTION INTRAMUSCULAR; INTRAVENOUS EVERY 6 HOURS PRN
Status: DISCONTINUED | OUTPATIENT
Start: 2024-11-15 | End: 2024-11-15

## 2024-11-15 RX ORDER — NIFEDIPINE 30 MG/1
60 TABLET, EXTENDED RELEASE ORAL ONCE
Status: COMPLETED | OUTPATIENT
Start: 2024-11-15 | End: 2024-11-15

## 2024-11-15 RX ORDER — OXYCODONE HYDROCHLORIDE 5 MG/1
5 TABLET ORAL EVERY 4 HOURS PRN
Status: DISCONTINUED | OUTPATIENT
Start: 2024-11-15 | End: 2024-11-19 | Stop reason: HOSPADM

## 2024-11-15 RX ORDER — LOPERAMIDE HYDROCHLORIDE 2 MG/1
4 CAPSULE ORAL
Status: DISCONTINUED | OUTPATIENT
Start: 2024-11-15 | End: 2024-11-19 | Stop reason: HOSPADM

## 2024-11-15 RX ORDER — HYDROCHLOROTHIAZIDE 12.5 MG/1
12.5 TABLET ORAL DAILY
Status: DISCONTINUED | OUTPATIENT
Start: 2024-11-15 | End: 2024-11-16

## 2024-11-15 RX ORDER — METOCLOPRAMIDE HYDROCHLORIDE 5 MG/ML
10 INJECTION INTRAMUSCULAR; INTRAVENOUS EVERY 6 HOURS PRN
Status: DISCONTINUED | OUTPATIENT
Start: 2024-11-15 | End: 2024-11-19 | Stop reason: HOSPADM

## 2024-11-15 RX ORDER — MAGNESIUM SULFATE HEPTAHYDRATE 40 MG/ML
4 INJECTION, SOLUTION INTRAVENOUS
Status: DISCONTINUED | OUTPATIENT
Start: 2024-11-15 | End: 2024-11-19 | Stop reason: HOSPADM

## 2024-11-15 RX ORDER — FENTANYL CITRATE 50 UG/ML
INJECTION, SOLUTION INTRAMUSCULAR; INTRAVENOUS
Status: COMPLETED | OUTPATIENT
Start: 2024-11-15 | End: 2024-11-15

## 2024-11-15 RX ORDER — BUPIVACAINE HYDROCHLORIDE 7.5 MG/ML
INJECTION, SOLUTION INTRASPINAL
Status: COMPLETED | OUTPATIENT
Start: 2024-11-15 | End: 2024-11-15

## 2024-11-15 RX ORDER — OXYTOCIN/0.9 % SODIUM CHLORIDE 30/500 ML
PLASTIC BAG, INJECTION (ML) INTRAVENOUS CONTINUOUS PRN
Status: DISCONTINUED | OUTPATIENT
Start: 2024-11-15 | End: 2024-11-15

## 2024-11-15 RX ORDER — MORPHINE SULFATE 1 MG/ML
INJECTION, SOLUTION EPIDURAL; INTRATHECAL; INTRAVENOUS
Status: COMPLETED | OUTPATIENT
Start: 2024-11-15 | End: 2024-11-15

## 2024-11-15 RX ORDER — LOPERAMIDE HYDROCHLORIDE 2 MG/1
2 CAPSULE ORAL
Status: DISCONTINUED | OUTPATIENT
Start: 2024-11-15 | End: 2024-11-19 | Stop reason: HOSPADM

## 2024-11-15 RX ORDER — SODIUM PHOSPHATE,MONO-DIBASIC 19G-7G/118
1 ENEMA (ML) RECTAL DAILY PRN
Status: DISCONTINUED | OUTPATIENT
Start: 2024-11-17 | End: 2024-11-19 | Stop reason: HOSPADM

## 2024-11-15 RX ORDER — FENTANYL CITRATE-0.9 % NACL/PF 10 MCG/ML
100 PLASTIC BAG, INJECTION (ML) INTRAVENOUS EVERY 5 MIN PRN
Status: DISCONTINUED | OUTPATIENT
Start: 2024-11-15 | End: 2024-11-15

## 2024-11-15 RX ORDER — KETOROLAC TROMETHAMINE 30 MG/ML
30 INJECTION, SOLUTION INTRAMUSCULAR; INTRAVENOUS EVERY 6 HOURS
Status: COMPLETED | OUTPATIENT
Start: 2024-11-15 | End: 2024-11-16

## 2024-11-15 RX ORDER — ENOXAPARIN SODIUM 100 MG/ML
40 INJECTION SUBCUTANEOUS EVERY 24 HOURS
Status: DISCONTINUED | OUTPATIENT
Start: 2024-11-16 | End: 2024-11-19 | Stop reason: HOSPADM

## 2024-11-15 RX ORDER — BUPIVACAINE HYDROCHLORIDE 7.5 MG/ML
1.6 INJECTION, SOLUTION EPIDURAL; RETROBULBAR ONCE
Status: DISCONTINUED | OUTPATIENT
Start: 2024-11-15 | End: 2024-11-15

## 2024-11-15 RX ORDER — MAGNESIUM SULFATE HEPTAHYDRATE 40 MG/ML
4 INJECTION, SOLUTION INTRAVENOUS ONCE
Status: COMPLETED | OUTPATIENT
Start: 2024-11-15 | End: 2024-11-15

## 2024-11-15 RX ORDER — MAGNESIUM SULFATE IN WATER 40 MG/ML
2 INJECTION, SOLUTION INTRAVENOUS CONTINUOUS
Status: DISCONTINUED | OUTPATIENT
Start: 2024-11-15 | End: 2024-11-16

## 2024-11-15 RX ORDER — NIFEDIPINE 10 MG/1
10-20 CAPSULE ORAL
Status: DISCONTINUED | OUTPATIENT
Start: 2024-11-15 | End: 2024-11-19 | Stop reason: HOSPADM

## 2024-11-15 RX ORDER — CEFAZOLIN SODIUM/WATER 2 G/20 ML
SYRINGE (ML) INTRAVENOUS PRN
Status: DISCONTINUED | OUTPATIENT
Start: 2024-11-15 | End: 2024-11-15

## 2024-11-15 RX ORDER — METHYLERGONOVINE MALEATE 0.2 MG/ML
200 INJECTION INTRAVENOUS
Status: DISCONTINUED | OUTPATIENT
Start: 2024-11-15 | End: 2024-11-15 | Stop reason: HOSPADM

## 2024-11-15 RX ORDER — FENTANYL CITRATE 50 UG/ML
15 INJECTION, SOLUTION INTRAMUSCULAR; INTRAVENOUS ONCE
Status: DISCONTINUED | OUTPATIENT
Start: 2024-11-15 | End: 2024-11-15

## 2024-11-15 RX ORDER — MISOPROSTOL 200 UG/1
400 TABLET ORAL
Status: DISCONTINUED | OUTPATIENT
Start: 2024-11-15 | End: 2024-11-15 | Stop reason: HOSPADM

## 2024-11-15 RX ORDER — DEXTROSE, SODIUM CHLORIDE, SODIUM LACTATE, POTASSIUM CHLORIDE, AND CALCIUM CHLORIDE 5; .6; .31; .03; .02 G/100ML; G/100ML; G/100ML; G/100ML; G/100ML
INJECTION, SOLUTION INTRAVENOUS CONTINUOUS
Status: DISCONTINUED | OUTPATIENT
Start: 2024-11-15 | End: 2024-11-16

## 2024-11-15 RX ORDER — GLYCOPYRROLATE 0.2 MG/ML
INJECTION, SOLUTION INTRAMUSCULAR; INTRAVENOUS PRN
Status: DISCONTINUED | OUTPATIENT
Start: 2024-11-15 | End: 2024-11-15

## 2024-11-15 RX ORDER — MODIFIED LANOLIN
OINTMENT (GRAM) TOPICAL
Status: DISCONTINUED | OUTPATIENT
Start: 2024-11-15 | End: 2024-11-19 | Stop reason: HOSPADM

## 2024-11-15 RX ORDER — IBUPROFEN 800 MG/1
800 TABLET, FILM COATED ORAL EVERY 6 HOURS
Status: DISCONTINUED | OUTPATIENT
Start: 2024-11-16 | End: 2024-11-19 | Stop reason: HOSPADM

## 2024-11-15 RX ORDER — ACETAMINOPHEN 325 MG/1
975 TABLET ORAL EVERY 6 HOURS
Status: DISCONTINUED | OUTPATIENT
Start: 2024-11-15 | End: 2024-11-19 | Stop reason: HOSPADM

## 2024-11-15 RX ORDER — METOCLOPRAMIDE 10 MG/1
10 TABLET ORAL EVERY 6 HOURS PRN
Status: DISCONTINUED | OUTPATIENT
Start: 2024-11-15 | End: 2024-11-15

## 2024-11-15 RX ORDER — LOPERAMIDE HYDROCHLORIDE 2 MG/1
4 CAPSULE ORAL
Status: DISCONTINUED | OUTPATIENT
Start: 2024-11-15 | End: 2024-11-15 | Stop reason: HOSPADM

## 2024-11-15 RX ORDER — CALCIUM GLUCONATE 94 MG/ML
1 INJECTION, SOLUTION INTRAVENOUS
Status: DISCONTINUED | OUTPATIENT
Start: 2024-11-15 | End: 2024-11-19 | Stop reason: HOSPADM

## 2024-11-15 RX ORDER — OXYTOCIN 10 [USP'U]/ML
10 INJECTION, SOLUTION INTRAMUSCULAR; INTRAVENOUS
Status: DISCONTINUED | OUTPATIENT
Start: 2024-11-15 | End: 2024-11-19 | Stop reason: HOSPADM

## 2024-11-15 RX ORDER — NIFEDIPINE 30 MG/1
30 TABLET, EXTENDED RELEASE ORAL ONCE
Status: DISCONTINUED | OUTPATIENT
Start: 2024-11-15 | End: 2024-11-15

## 2024-11-15 RX ORDER — LIDOCAINE 40 MG/G
CREAM TOPICAL
Status: DISCONTINUED | OUTPATIENT
Start: 2024-11-15 | End: 2024-11-19 | Stop reason: HOSPADM

## 2024-11-15 RX ORDER — MISOPROSTOL 200 UG/1
400 TABLET ORAL
Status: DISCONTINUED | OUTPATIENT
Start: 2024-11-15 | End: 2024-11-19 | Stop reason: HOSPADM

## 2024-11-15 RX ORDER — NALBUPHINE HYDROCHLORIDE 10 MG/ML
2.5-5 INJECTION INTRAMUSCULAR; INTRAVENOUS; SUBCUTANEOUS EVERY 6 HOURS PRN
Status: DISCONTINUED | OUTPATIENT
Start: 2024-11-15 | End: 2024-11-15

## 2024-11-15 RX ORDER — SODIUM CHLORIDE, SODIUM LACTATE, POTASSIUM CHLORIDE, CALCIUM CHLORIDE 600; 310; 30; 20 MG/100ML; MG/100ML; MG/100ML; MG/100ML
INJECTION, SOLUTION INTRAVENOUS
Status: DISCONTINUED
Start: 2024-11-15 | End: 2024-11-15 | Stop reason: HOSPADM

## 2024-11-15 RX ORDER — LIDOCAINE 40 MG/G
CREAM TOPICAL
Status: DISCONTINUED | OUTPATIENT
Start: 2024-11-15 | End: 2024-11-15 | Stop reason: HOSPADM

## 2024-11-15 RX ORDER — SIMETHICONE 80 MG
80 TABLET,CHEWABLE ORAL 4 TIMES DAILY PRN
Status: DISCONTINUED | OUTPATIENT
Start: 2024-11-15 | End: 2024-11-19 | Stop reason: HOSPADM

## 2024-11-15 RX ORDER — SODIUM CHLORIDE, SODIUM LACTATE, POTASSIUM CHLORIDE, CALCIUM CHLORIDE 600; 310; 30; 20 MG/100ML; MG/100ML; MG/100ML; MG/100ML
INJECTION, SOLUTION INTRAVENOUS CONTINUOUS PRN
Status: DISCONTINUED | OUTPATIENT
Start: 2024-11-15 | End: 2024-11-15

## 2024-11-15 RX ORDER — ONDANSETRON 2 MG/ML
INJECTION INTRAMUSCULAR; INTRAVENOUS PRN
Status: DISCONTINUED | OUTPATIENT
Start: 2024-11-15 | End: 2024-11-15

## 2024-11-15 RX ORDER — CARBOPROST TROMETHAMINE 250 UG/ML
250 INJECTION, SOLUTION INTRAMUSCULAR
Status: DISCONTINUED | OUTPATIENT
Start: 2024-11-15 | End: 2024-11-15 | Stop reason: HOSPADM

## 2024-11-15 RX ORDER — HYDRALAZINE HYDROCHLORIDE 20 MG/ML
10 INJECTION INTRAMUSCULAR; INTRAVENOUS
Status: DISCONTINUED | OUTPATIENT
Start: 2024-11-15 | End: 2024-11-19 | Stop reason: HOSPADM

## 2024-11-15 RX ORDER — OXYTOCIN/0.9 % SODIUM CHLORIDE 30/500 ML
340 PLASTIC BAG, INJECTION (ML) INTRAVENOUS CONTINUOUS PRN
Status: DISCONTINUED | OUTPATIENT
Start: 2024-11-15 | End: 2024-11-15 | Stop reason: HOSPADM

## 2024-11-15 RX ORDER — SODIUM CHLORIDE 9 MG/ML
INJECTION, SOLUTION INTRAVENOUS CONTINUOUS
Status: DISCONTINUED | OUTPATIENT
Start: 2024-11-15 | End: 2024-11-16

## 2024-11-15 RX ORDER — ACETAMINOPHEN 325 MG/1
975 TABLET ORAL ONCE
Status: COMPLETED | OUTPATIENT
Start: 2024-11-15 | End: 2024-11-15

## 2024-11-15 RX ORDER — LABETALOL HYDROCHLORIDE 5 MG/ML
20-80 INJECTION, SOLUTION INTRAVENOUS EVERY 10 MIN PRN
Status: DISCONTINUED | OUTPATIENT
Start: 2024-11-15 | End: 2024-11-15

## 2024-11-15 RX ADMIN — SENNOSIDES AND DOCUSATE SODIUM 1 TABLET: 50; 8.6 TABLET ORAL at 20:14

## 2024-11-15 RX ADMIN — HYDROCHLOROTHIAZIDE 12.5 MG: 12.5 TABLET ORAL at 21:44

## 2024-11-15 RX ADMIN — SODIUM CHLORIDE: 9 INJECTION, SOLUTION INTRAVENOUS at 21:43

## 2024-11-15 RX ADMIN — LABETALOL HYDROCHLORIDE 20 MG: 5 INJECTION, SOLUTION INTRAVENOUS at 02:38

## 2024-11-15 RX ADMIN — MORPHINE SULFATE 0.15 MG: 1 INJECTION EPIDURAL; INTRATHECAL; INTRAVENOUS at 04:30

## 2024-11-15 RX ADMIN — LABETALOL HYDROCHLORIDE 40 MG: 5 INJECTION, SOLUTION INTRAVENOUS at 02:55

## 2024-11-15 RX ADMIN — PHENYLEPHRINE HYDROCHLORIDE 100 MCG: 10 INJECTION INTRAVENOUS at 05:16

## 2024-11-15 RX ADMIN — Medication 300 ML/HR: at 05:03

## 2024-11-15 RX ADMIN — METOCLOPRAMIDE HYDROCHLORIDE 10 MG: 5 INJECTION INTRAMUSCULAR; INTRAVENOUS at 11:02

## 2024-11-15 RX ADMIN — NIFEDIPINE 30 MG: 30 TABLET, FILM COATED, EXTENDED RELEASE ORAL at 07:46

## 2024-11-15 RX ADMIN — Medication 2 G: at 04:39

## 2024-11-15 RX ADMIN — HYDRALAZINE HYDROCHLORIDE 10 MG: 20 INJECTION INTRAMUSCULAR; INTRAVENOUS at 21:13

## 2024-11-15 RX ADMIN — FUROSEMIDE 20 MG: 10 INJECTION, SOLUTION INTRAMUSCULAR; INTRAVENOUS at 22:26

## 2024-11-15 RX ADMIN — KETOROLAC TROMETHAMINE 30 MG: 30 INJECTION, SOLUTION INTRAMUSCULAR at 05:26

## 2024-11-15 RX ADMIN — LABETALOL HYDROCHLORIDE 400 MG: 200 TABLET, FILM COATED ORAL at 07:46

## 2024-11-15 RX ADMIN — Medication 4 EACH: at 16:30

## 2024-11-15 RX ADMIN — NIFEDIPINE 60 MG: 30 TABLET, FILM COATED, EXTENDED RELEASE ORAL at 17:08

## 2024-11-15 RX ADMIN — SODIUM CHLORIDE, POTASSIUM CHLORIDE, SODIUM LACTATE AND CALCIUM CHLORIDE: 600; 310; 30; 20 INJECTION, SOLUTION INTRAVENOUS at 04:28

## 2024-11-15 RX ADMIN — HYDROXYZINE HYDROCHLORIDE 50 MG: 50 TABLET, FILM COATED ORAL at 22:08

## 2024-11-15 RX ADMIN — GLYCOPYRROLATE 0.2 MG: 0.2 INJECTION, SOLUTION INTRAMUSCULAR; INTRAVENOUS at 04:34

## 2024-11-15 RX ADMIN — PHENYLEPHRINE HYDROCHLORIDE 200 MCG: 10 INJECTION INTRAVENOUS at 04:29

## 2024-11-15 RX ADMIN — BUPIVACAINE 20 ML: 13.3 INJECTION, SUSPENSION, LIPOSOMAL INFILTRATION at 05:43

## 2024-11-15 RX ADMIN — FENTANYL CITRATE 15 MCG: 50 INJECTION INTRAMUSCULAR; INTRAVENOUS at 04:30

## 2024-11-15 RX ADMIN — KETOROLAC TROMETHAMINE 30 MG: 30 INJECTION, SOLUTION INTRAMUSCULAR at 18:19

## 2024-11-15 RX ADMIN — SIMETHICONE 80 MG: 80 TABLET, CHEWABLE ORAL at 18:20

## 2024-11-15 RX ADMIN — BUPIVACAINE HYDROCHLORIDE 20 ML: 2.5 INJECTION, SOLUTION EPIDURAL; INFILTRATION; INTRACAUDAL at 05:43

## 2024-11-15 RX ADMIN — HYDRALAZINE HYDROCHLORIDE 10 MG: 20 INJECTION INTRAMUSCULAR; INTRAVENOUS at 03:34

## 2024-11-15 RX ADMIN — PHENYLEPHRINE HYDROCHLORIDE 100 MCG/MIN: 10 INJECTION INTRAVENOUS at 04:29

## 2024-11-15 RX ADMIN — ONDANSETRON 2 MG: 2 INJECTION INTRAMUSCULAR; INTRAVENOUS at 04:44

## 2024-11-15 RX ADMIN — MAGNESIUM SULFATE HEPTAHYDRATE 2 G/HR: 40 INJECTION, SOLUTION INTRAVENOUS at 09:27

## 2024-11-15 RX ADMIN — ACETAMINOPHEN 975 MG: 325 TABLET, FILM COATED ORAL at 21:44

## 2024-11-15 RX ADMIN — HYDROXYZINE HYDROCHLORIDE 50 MG: 50 TABLET, FILM COATED ORAL at 03:07

## 2024-11-15 RX ADMIN — HYDRALAZINE HYDROCHLORIDE 10 MG: 20 INJECTION INTRAMUSCULAR; INTRAVENOUS at 09:42

## 2024-11-15 RX ADMIN — ACETAMINOPHEN 975 MG: 325 TABLET, FILM COATED ORAL at 15:20

## 2024-11-15 RX ADMIN — ACETAMINOPHEN 975 MG: 325 TABLET, FILM COATED ORAL at 04:06

## 2024-11-15 RX ADMIN — HYDRALAZINE HYDROCHLORIDE 10 MG: 20 INJECTION INTRAMUSCULAR; INTRAVENOUS at 21:34

## 2024-11-15 RX ADMIN — BUPIVACAINE HYDROCHLORIDE IN DEXTROSE 1.6 ML: 7.5 INJECTION, SOLUTION SUBARACHNOID at 04:30

## 2024-11-15 RX ADMIN — ONDANSETRON 4 MG: 2 INJECTION INTRAMUSCULAR; INTRAVENOUS at 09:23

## 2024-11-15 RX ADMIN — LABETALOL HYDROCHLORIDE 80 MG: 5 INJECTION, SOLUTION INTRAVENOUS at 03:04

## 2024-11-15 RX ADMIN — KETOROLAC TROMETHAMINE 30 MG: 30 INJECTION, SOLUTION INTRAMUSCULAR at 11:42

## 2024-11-15 RX ADMIN — MAGNESIUM SULFATE IN WATER FOR 4 G: 40 INJECTION INTRAVENOUS at 03:32

## 2024-11-15 RX ADMIN — HYDRALAZINE HYDROCHLORIDE 10 MG: 20 INJECTION INTRAMUSCULAR; INTRAVENOUS at 04:04

## 2024-11-15 NOTE — PROGRESS NOTES
This writer attempted to complete psychosocial assessment with parents but they were either engaged with other providers or sleeping.  Vanessa delivered her daughter Teresita via  in the early morning.  This writer will check in with family on Monday.    Lolis HICKMAN, MSW, [facility]  Maternal Child Health     Call on Vocera during daytime hours  [phone]--office desk phone    After Hours Vocera Group: Ped SW After Hours On Call [phone]  Weekend Daytime Vocera Group: Peds SW Onsite Weekend [facility]

## 2024-11-15 NOTE — PROGRESS NOTES
Psychiatry Consultation; Follow up              Reason for Consult, requesting source:      Reason for Consult: Anxiety, new onset complications of pregnancy  Requesting source: Mirta Garcia     Labs and imaging reviewed.    Total time spent in chart review, patient interview and coordination of care; 60 minutes - all time was spent on the date of the encounter that I saw patient                Interim history:    Patient was somewhat drowsy during the interview due to anti-nausea meds. Reports feeling better compared to last night. States that whenever she feels anxious and associated chest tightness, she focuses on her breathing and does meditation, which tends to help with anxiety most times. Needed to taker hydroxyzine PRN prior to  earlier today. Is aware that hydroxyzine can decrease milk supply. Once feeling better, would like to visit her baby in NICU as much as possible. Would like to continue seeing Gisel (Psychologist). Denies pain, SI/ HI. Inquired if any of the medications being administered could affect the likelihood of PPD. Reassured patient that they do not. Discussed red flag signs for PPD, especially within 2 weeks after delivery.         Current Medications:     Current Facility-Administered Medications   Medication Dose Route Frequency Provider Last Rate Last Admin    12 classic cell salt combination   HOME MED  2 PELLET Sublingual Daily Regina Spann MD   2 PELLET at 24    12 classic cell salt combination   HOME MED  2 PELLET Oral Q8H Regina Spann MD        acetaminophen (TYLENOL) tablet 975 mg  975 mg Oral Q6H Regina Spann MD        [START ON 2024] enoxaparin ANTICOAGULANT (LOVENOX) injection 40 mg  40 mg Subcutaneous Q24H Regina Spann MD        [START ON 2024] ibuprofen (ADVIL/MOTRIN) tablet 800 mg  800 mg Oral Q6H Regina Spann MD        ketorolac (TORADOL) injection 30 mg  30 mg Intravenous Q6H Regina Spann MD   30 mg at  "11/15/24 1142    labetalol (NORMODYNE) tablet 400 mg  400 mg Oral Q8H [facility] Regina Spann MD   400 mg at 11/15/24 0746    lactated ringers infusion      50 mL/hr at 11/15/24 1143 Rate Verify at 11/15/24 1143    magnesium glycinate capsule 200 mg  200 mg Oral At Bedtime Regina Spann MD   200 mg at 24 2235    NIFEdipine ER OSMOTIC (PROCARDIA XL) 24 hr tablet 30 mg  30 mg Oral Daily Regina Spann MD   30 mg at 11/15/24 0746    Organs Blend Supplement 6 capsules [HOME med]  6 capsule Oral Daily Regina Spann MD   6 capsule at 24 1137    Prenatal Gummies 0.18-25 MG CHEW 4 each [HOME supply]  4 each Oral Daily Regina Spann MD   4 each at 24 1137    senna-docusate (SENOKOT-S/PERICOLACE) 8.6-50 MG per tablet 1 tablet  1 tablet Oral BID Regina Spann MD        Or    senna-docusate (SENOKOT-S/PERICOLACE) 8.6-50 MG per tablet 2 tablet  2 tablet Oral BID Regina Spann MD        sodium chloride (PF) 0.9% PF flush 3 mL  3 mL Intracatheter Q8H Regina Spann MD   3 mL at 11/15/24 0859    sodium chloride (PF) 0.9% PF flush 3 mL  3 mL Intracatheter Q8H Nicole Wilson MD   3 mL at 11/15/24 1101    sodium chloride (PF) 0.9% PF flush 3 mL  3 mL Intracatheter Q8H Regina Spann MD   3 mL at 11/15/24 0238    valACYclovir (VALTREX) tablet 1,000 mg  1,000 mg Oral TID Regina Spann MD   1,000 mg at 24 1826              MSE:     Appearance: somewhat drowsy (s/p  and anti-nausea meds), adequately groomed, laying in hospital bed, currently in gown  Attitude:  cooperative  Eye Contact:  good  Mood:  \"better\"  Affect:  mood congruent and intensity is normal  Speech:  clear, coherent  Psychomotor Behavior:  no evidence of tardive dyskinesia, dystonia, or tics  Muscle strength and tone: grossly intact  Thought Process:  logical and linear  Associations:  no loose associations  Thought Content:  no evidence of suicidal ideation or homicidal ideation, no evidence of psychotic " thought, no auditory hallucinations present, and no visual hallucinations present  Perceptions: No hallucinations   Insight:  good  Judgement:  intact  Oriented to:   Person, place, time, situation  Attention Span and Concentration:  intact  Recent and Remote Memory:  intact    Vital signs:  Temp: 98.1  F (36.7  C) Temp src: Oral BP: 126/84 Pulse: 55   Resp: 16 SpO2: 95 % O2 Device: None (Room air)     Weight: 90 kg (198 lb 8 oz)  Estimated body mass index is 38.77 kg/m  as calculated from the following:    Height as of 10/25/24: 1.524 m (5').    Weight as of this encounter: 90 kg (198 lb 8 oz).    EKG: No EKG during this admission         DSM-5 Diagnosis:   Adjustment Disorder with anxious features   R/o BRITTANY          Assessment:   Vanessa Turcios is a 29 year old  female, s/p emergent  on 11/15/24 at 31w6d / to preeclampsia with severe feature by blood pressure criteria refractory to anti-hypertensives. She has no past psychiatric history and medical history of HSV-2, preeclampsia with severe features as well as fluid leakage with pre labor concerns who was admitted to [facility] 2024 with preeclampsia with severe features and increased anxiety. Psychiatry was consulted for anxiety due to complications of pregnancy.      She is doing okay after the emergency . Utilizes her current coping mechanisms and stress reduction tools such as meditation, breathing exercises, and acupuncture when experiencing anxiety and chest tightness. Last used her PRN hydroxyzine prior to . Currently her discharge date is unknown. Remains interested in talk therapy and currently being followed by peripartum psych specialist while admitted. Informed patient of PPD red flag signs that would warrant a visit to ED (decreased motivation, inability to focus and care for the , extreme sadness, suicidal and/or homicidal ideation, etc).           Summary of Recommendations:   Legal: Voluntary   Safety: No  1:1 required  Labs/Studies: None added  Medications: Continue with PRN Hydroxyzine as needed.   Follow-Up: Will try to reach out to Psychologist Gisel Manzo to check in with patient again given the emergent  yesterday

## 2024-11-15 NOTE — ANESTHESIA PROCEDURE NOTES
TAP Procedure Note    Pre-Procedure   Staff -        Anesthesiologist:  Katie Valle MD       Resident/Fellow: Parag Maldonado MD       Performed By: resident and with residents       Procedure performed by resident/fellow/CRNA in presence of a teaching physician.         Location: OB       Procedure Start/Stop Times: 11/15/2024 5:43 AM and 11/15/2024 5:48 AM       Pre-Anesthestic Checklist: patient identified, IV checked, site marked, risks and benefits discussed, informed consent, monitors and equipment checked, pre-op evaluation, at physician/surgeon's request and post-op pain management  Timeout:       Correct Patient: Yes        Correct Procedure: Yes        Correct Site: Yes        Correct Position: Yes        Correct Laterality: Yes        Site Marked: Yes  Procedure Documentation  Procedure: TAP       Laterality: bilateral       Patient Position: supine       Skin prep: Chloraprep       Needle Gauge: 21.        Needle Length (millimeters): 110        Ultrasound guided       1. Ultrasound was used to identify targeted nerve, plexus, vascular marker, or fascial plane and place a needle adjacent to it in real-time.       2. Ultrasound was used to visualize the spread of anesthetic in close proximity to the above referenced structure.       3. A permanent image is entered into the patient's record.       4. The visualized anatomic structures appeared normal.       5. There were no apparent abnormal pathologic findings.    Assessment/Narrative         The placement was negative for: blood aspirated, painful injection and site bleeding       Paresthesias: No.       Bolus given via needle. no blood aspirated via catheter.        Secured via.        Insertion/Infusion Method: Single Shot       Complications: none    Medication(s) Administered   Bupivacaine 0.25% PF (Infiltration) - Infiltration   20 mL - 11/15/2024 5:43:00 AM  Bupivacaine liposome (Exparel) 1.3% LA inj susp (Infiltration) -  "Infiltration   20 mL - 11/15/2024 5:43:00 AM  Medication Administration Time: 11/15/2024 5:43 AM      FOR [facility] ONLY:   Pain Team Contact information: please page the Pain Team Via [employer]. Search \"Pain\". During daytime hours, please page the attending first. At night please page the resident first.      "

## 2024-11-15 NOTE — ANESTHESIA POSTPROCEDURE EVALUATION
Patient: Vanessa Turcios    Procedure: Procedure(s):   section       Anesthesia Type:  Spinal    Note:  Disposition: Inpatient   Postop Pain Control: Uneventful            Sign Out: Well controlled pain   PONV: No   Neuro/Psych: Uneventful            Sign Out: Acceptable/Baseline neuro status   Airway/Respiratory: Uneventful            Sign Out: Acceptable/Baseline resp. status   CV/Hemodynamics: Uneventful            Sign Out: Acceptable CV status; No obvious hypovolemia; No obvious fluid overload   Other NRE: NONE   DID A NON-ROUTINE EVENT OCCUR? No       Last vitals:  Vitals:    11/15/24 0600 11/15/24 0615 11/15/24 0631   BP: (!) 152/94 (!) 145/89 138/88   Pulse:      Resp:      Temp:      SpO2:          Electronically Signed By: Katie Valle MD  November 15, 2024  6:42 AM

## 2024-11-15 NOTE — PLAN OF CARE
Data: Pt to OB PACU via cart. PIV infusing without complications, wilson with slightly yellow color urine to gravity, afebrile, BP in mid range, pt does not complain of pain and/or nausea.   Interventions: IV Pitocin drip to pump, monitors and alarms on, SCD on. Given morning doses of Labetalol 400 mg tab and Nifedipine 30 mg tab p.o.  Response: Pain controlled, vaginal bleeding is minimal, uterus is well contracted.  Plan: Patient instructed to notify RN for pain or nausea, routine post op cares, initiate breastfeeding/pumping as soon as patient is able.     Report given to Joan MATA.

## 2024-11-15 NOTE — ANESTHESIA PREPROCEDURE EVALUATION
Anesthesia Pre-Procedure Evaluation    Patient: Vanessa Turcios   MRN: [id number] : 1995        Procedure :           Past Medical History:   Diagnosis Date    Acne     Development delay     Elevated liver enzymes 10/25/2024    Encounter for supervision of low-risk first pregnancy in third trimester 10/25/2024    Genital herpes 2013    Gestational HTN, third trimester 10/25/2024      Past Surgical History:   Procedure Laterality Date    NO HISTORY OF SURGERY      WISDOM TOOTH EXTRACTION      in high school      No Known Allergies   Social History     Tobacco Use    Smoking status: Never    Smokeless tobacco: Never   Substance Use Topics    Alcohol use: Not Currently      Wt Readings from Last 1 Encounters:   24 90 kg (198 lb 8 oz)        Anesthesia Evaluation   Pt has not had prior anesthetic         ROS/MED HX  ENT/Pulmonary:  - neg pulmonary ROS     Neurologic:  - neg neurologic ROS     Cardiovascular: Comment: Pre E W/SF    (+)  - - PIH and BP Meds, Mg ++ gtt and Severe  -  - -                                      METS/Exercise Tolerance:     Hematologic:       Musculoskeletal:       GI/Hepatic:       Renal/Genitourinary:       Endo: Comment: Elevated liver enzymes      Psychiatric/Substance Use: Comment: Developmental delay      Infectious Disease:       Malignancy:       Other:               OUTSIDE LABS:  CBC:   Lab Results   Component Value Date    WBC 14.2 (H) 11/15/2024    WBC 14.6 (H) 2024    HGB 11.5 (L) 11/15/2024    HGB 11.7 2024    HCT 33.3 (L) 11/15/2024    HCT 32.4 (L) 2024     11/15/2024     2024     BMP:   Lab Results   Component Value Date     (L) 2024     (L) 2024    POTASSIUM 4.7 2024    POTASSIUM 4.3 2024    CHLORIDE 103 2024    CHLORIDE 100 2024    CO2 18 (L) 2024    CO2 17 (L) 2024    BUN 26.3 (H) 2024    BUN 28.5 (H) 2024    CR 0.76 2024    CR 0.85  "11/13/2024    GLC 90 11/14/2024     (H) 11/13/2024     COAGS: No results found for: \"PTT\", \"INR\", \"FIBR\"  POC:   Lab Results   Component Value Date    HCG Negative 07/24/2014     HEPATIC:   Lab Results   Component Value Date    ALBUMIN 2.8 (L) 11/14/2024    PROTTOTAL 5.4 (L) 11/14/2024    ALT 26 11/14/2024    AST 29 11/14/2024    ALKPHOS 142 11/14/2024    BILITOTAL <0.2 11/14/2024     OTHER:   Lab Results   Component Value Date    BJ 7.9 (L) 11/14/2024    LIPASE 40 10/25/2024       Anesthesia Plan    ASA Status:  3, emergent       Anesthesia Type: Spinal.              Consents    Anesthesia Plan(s) and associated risks, benefits, and realistic alternatives discussed. Questions answered and patient/representative(s) expressed understanding.     - Discussed:     - Discussed with:  Patient            Postoperative Care            Comments:           neg OB ROS.      Parag Maldonado MD    I have reviewed the pertinent notes and labs in the chart from the past 30 days and (re)examined the patient.  Any updates or changes from those notes are reflected in this note.     # Hyponatremia: Lowest Na = 131 mmol/L in last 2 days, will monitor as appropriate       # Hypoalbuminemia: Lowest albumin = 2.8 g/dL at 11/14/2024  7:32 AM, will monitor as appropriate     # Hypertension: Noted on problem list                      "

## 2024-11-15 NOTE — PROGRESS NOTES
Brief OBGYN Progress Note    Vanessa began having sustained severe range BP at 0236, with BP to the 190s systolic. After treating with IV labetalol 20, 40, then 80mg, BP still sustained severe range, at 186/97. To bedside to discuss with patient and her  Ang. Vanessa reports new chest tightness as of waking up around 3am, she is unsure if it is SOB or anxiety. Otherwise she is feeling ok. Last ate at 9pm.     Vitals:    24 2235 11/15/24 0236 11/15/24 0250 11/15/24 0301   BP: (!) 154/80 (!) 193/88 (!) 185/83 (!) 185/89     BP (!) 185/89   Pulse 70   Temp 98.4  F (36.9  C) (Oral)   Resp 16   Wt 90 kg (198 lb 8 oz)   LMP 04/10/2024   SpO2 98%   BMI 38.77 kg/m    Gen: appears anxious, periorbital and facial edema notable  CV: RRR, well perfused  Pulm: deeply breathing  Abd: soft, gravid, non-tender    FHT: 130bpm, moderate variability, accels present, no decels  [place]: quiet    Reviewed newly, significantly elevated BPs that have been unresponsive to IV antihypertensives that previously improved blood pressure during this admission. Reviewed that with these elevated BP Vanessa herself is at risk of the dangerous sequelae of pre-eclampsia, as is her baby. Recommended starting magnesium both for maternal seizure prophylaxis and also for fetal neuroprotection as GA is <32 weeks, and moving toward delivery via . Reviewed my concern for fetal and maternal wellbeing, and Vanessa and Ang expressed understanding.     Briefly reviewed the risks of  including infection, bleeding, injury to surrounding tissues. Reviewed that while we will make every effort to perform a low transverse hysterotomy, there is a possibility a classical uterine incision will be necessary, in which case all future deliveries will be recommended via  at 36-37 weeks. Vanessa and Ang expressed understanding. Vanessa consented to , and blood transfusion if necessary.     To OR within 30 minutes. Dr. Colvin, Dr. Garcia,  Anesthesia aware.     Regina Spann MD  Obstetrics & Gynecology, PGY-3  11/15/2024 3:52 AM

## 2024-11-15 NOTE — ANESTHESIA PROCEDURE NOTES
"Intrathecal injection Procedure Note    Pre-Procedure   Staff -        Anesthesiologist:  Katie Valle MD       Resident/Fellow: Parag Maldonado MD       Performed By: resident and with residents       Procedure performed by resident/fellow/CRNA in presence of a teaching physician.         Location: OB       Procedure Start/Stop Times: 11/15/2024 4:30 AM and 11/15/2024 4:35 AM       Pre-Anesthestic Checklist: patient identified, IV checked, risks and benefits discussed, informed consent, monitors and equipment checked, pre-op evaluation, at physician/surgeon's request and post-op pain management  Timeout:       Correct Patient: Yes        Correct Procedure: Yes        Correct Site: Yes        Correct Position: Yes   Procedure Documentation  Procedure: intrathecal injection       Patient Position: sitting       Skin prep: Chloraprep       Insertion Site: L3-4. (midline approach).       Needle Gauge: 25.        Needle Length (Inches): 5        Spinal Needle Type: Pencan       Introducer used       Introducer: 20 G       # of attempts: 1 and  # of redirects:  0    Assessment/Narrative         Paresthesias: No.       CSF fluid: clear.       Opening pressure was cmH2O while  Sitting.      Medication(s) Administered   0.75% Hyperbaric Bupivacaine (Intrathecal) - Intrathecal   1.6 mL - 11/15/2024 4:30:00 AM  Morphine PF 1 mg/mL (Intrathecal) - Intrathecal   0.15 mg - 11/15/2024 4:30:00 AM  Fentanyl PF (Intrathecal) - Intrathecal   15 mcg - 11/15/2024 4:30:00 AM  Medication Administration Time: 11/15/2024 4:30 AM      FOR [facility] ONLY:   Pain Team Contact information: please page the Pain Team Via [employer]. Search \"Pain\". During daytime hours, please page the attending first. At night please page the resident first.      "

## 2024-11-15 NOTE — TELEPHONE ENCOUNTER
Received information request    Placed form in [facility]'s mailbox for Dr. Garcia to sign,

## 2024-11-15 NOTE — PROGRESS NOTES
[facility]  Magnesium Check Note    S:   Patient is feeling well overall but is understandable exhausted.  Denies headache, vision changes/spots in vision, dizziness, chest pain, shortness of breath, RUQ or epigastric pain.    O:  Patient Vitals for the past 4 hrs:   BP Temp Temp src Resp SpO2   11/15/24 1601 (!) 148/90 98.2  F (36.8  C) Oral 16 96 %   11/15/24 1500 (!) 144/98 -- -- 16 96 %   11/15/24 1400 (!) 151/95 98.2  F (36.8  C) Oral 16 96 %   11/15/24 1300 126/84 -- -- 16 95 %     Gen: NAD  CV:  RRR, no murmurs  Pulm:  CTAB, no wheezes or crackles  Abd:  Soft, non-tender  Ext:  Patellar and brachioradialis reflexes 1+ b/l, no clonus b/l, 2+ LE edema b/l    I/O:  I/O last 3 completed shifts:  In: 1146.7 [P.O.:750; I.V.:396.7]  Out: 818 [Urine:500; Blood:318]    A/P:  Vanessa Turcios is a 29 year old  on PPD#0 s/p PLTCS, with pregnancy complicated by preeclampsia with severe features.    Preeclampsia with severe features  - BP: mild to high mild range blood pressures. Heart rate persistently in the 50s. Will hold next dose of labetalol and increase nifedipine to BID. Given, persistently elevated blood pressures will give 60mg now  - UOP: 0.41 ml/kg/hr over the last 4 hours and patient intermittently nauseous with sub-optimal PO intake. Creatinine is also increased at 0.98 (baseline is 0.62 and has uptrended since admission). Will repeat labs at . Will keep wilson in and continue maintenance fluids until PO intake and UOP improves.  - Symptoms: Denies  - HELLP labs notable for Cr. 0.98 o/w wnl. Repeat with magnesium level at   - Mag sulfate for seizure prophylaxis: 2g/hr  - Next clinical mag check at     Postpartum:  routine postpartum care    Sofia Muir MD  Obstetrics & Gynecology, PGY-2  11/15/2024 5:00 PM

## 2024-11-15 NOTE — PLAN OF CARE
Patient afebrile and vitals stable. Patient denies LOF, decrease in fetal movement, vaginal bleeding, contractions, headache, and visual changes. Patient has moderate bilateral edema in her ankles and feet. The patient has mild edema bilaterally in her face and hands. Strict I&Os charted in flowsheet and daily weight done. Patient was treated for severe range BP with IV Hydralazine this AM but did not have a severe range BP for the rest of the day. Patient on 400mg oral Labetalol TID and 30 mg of oral Nifedipine. BPs have been in moderate range during the day today. Patient got a new IV in her forearm. Fetal heart monitor charted in flowsheet.

## 2024-11-15 NOTE — TELEPHONE ENCOUNTER
Received [facility] Paperwork    Placed form in Cathie's mailbox to be filled by Dr. Garcia

## 2024-11-15 NOTE — CONSULTS
SPIRITUAL HEALTH SERVICES Consult Note  [facility] ([facility] [place]    Met with Vanessa and Ang in [facility] room per the delivery of their daughter, Teresita, overnight via emergency . Vanessa and Ang are familiar to me from antepartum.     Both parents reflected their wide range of emotions - gratitude for a healthy baby, exhaustion after a long night, and great love for their baby. Vanessa named the many things she is thankful for, such as her baby's health, her health, the many ways she felt cared for and respected throughout her delivery, and that the team was able to perform a transverse . Vanessa had deeply hoped for an unmedicated home birth, and despite this, feels her birth was a wonderful experience overall. Ang was also moved by the great tenderness and consideration he felt the team exhibited throughout delivery.     I affirmed the hard work Vanessa has put into her meditation and yoga practices, that have helped her to cope so well through these sudden events. They look forward to spending more time with Teresita soon, when they have gotten more rest. We made plans for follow up next week.     Annette Block M.Div.  Associate   Pager [phone]  Reachable via [employer]    * [facility] remains available  for emergent requests/referrals, either by having the switchboard page the on-call  or by entering an ASAP/STAT consult in Epic (this will also page the on-call ). Routine Epic consults receive an initial response within 24 hours.*

## 2024-11-15 NOTE — ANESTHESIA CARE TRANSFER NOTE
Patient: Vanessa Turcios    Procedure: Procedure(s):   section       Diagnosis: * No pre-op diagnosis entered *  Diagnosis Additional Information: No value filed.    Anesthesia Type:   Spinal     Note:    Oropharynx: oropharynx clear of all foreign objects  Level of Consciousness: awake  Oxygen Supplementation: room air    Independent Airway: airway patency satisfactory and stable  Dentition: dentition unchanged  Vital Signs Stable: post-procedure vital signs reviewed and stable  Report to RN Given: handoff report given  Patient transferred to: Labor and Delivery    Handoff Report: Identifed the Patient, Identified the Reponsible Provider, Reviewed the pertinent medical history, Discussed the surgical course, Reviewed Intra-OP anesthesia mangement and issues during anesthesia, Set expectations for post-procedure period and Allowed opportunity for questions and acknowledgement of understanding      Vitals:  Vitals Value Taken Time   /94 11/15/24 0600   Temp     Pulse     Resp     SpO2         Electronically Signed By: Parag Maldonado MD  November 15, 2024  6:14 AM

## 2024-11-15 NOTE — PROVIDER NOTIFICATION
11/15/24 0253   Provider Notification   Provider Name/Title Dr. Spann   Method of Notification At Bedside   Request Evaluate - Remote   Notification Reason Maternal Vital Sign Change;Status Update     Updated provider that 408 is having severe range pressures and being treated with labetalol. Per provider patient is to be NPO at the time being and EFM will be reapplied.

## 2024-11-15 NOTE — PROVIDER NOTIFICATION
11/15/24 0848   Provider Notification   Provider Name/Title dr tijerina g2   Method of Notification Electronic Page     Bp and p on arrival to [facility]

## 2024-11-15 NOTE — OP NOTE
[facility]  Operative Note     Surgery Date:  11/15/2024  Surgeon:  Mirta Garcia MD  Assistants:  Regina Spann MD, PGY-3    Pre-op Diagnosis:    - Intrauterine pregnancy at 31w6d  - Preeclampsia with severe features  - History of elevated LFTs  - History of genital HSV with prodromal symptoms       Post-op Diagnosis:    - Same as above, now   - Liveborn female infant     Procedure:    - Primary low-transverse  section with single layer uterine closure via Pfannenstiel incision    Anesthesia:  Spinal  QBL:    318 mL  IVF:    350 mL crystalloid  UOP:    75 mL clear yellow urine at the end of the case; patient voided just prior to start of case  Drains:   [place] Catheter   Specimens:   Routine cord blood/segment, placenta  Antibiotics:  2g Ancef  Additional medications: continuous magnesium sulfate  Complications: None     Indications:   Vanessa Turcios is a 29 year old  at 31w6d admitted for preeclampsia with severe features by blood pressure criteria, requiring escalating doses of IV antihypertensives upon admission. She received 24 hours of magnesium sulfate started for seizure prophylaxis and fetal neuroprotection, a full course of betamethasone for fetal lung maturity, and was started on long-acting antihypertensives which were uptitrated to labetalol 400 TID and nifedipine 30 daily. She was seen by the NICU, Spiritual Health and Acupuncture during her antepartum stay. On the morning of antepartum day 5, she developed significantly elevated, sustained severe range blood pressures refractory to IV labetalol 20/40/80mg and IV hydralazine 10mg x2, without improvement in BP.  delivery was recommended. The risks, benefits, and alternatives of  section were discussed with the patient, and she agreed to proceed. Signed informed consent was signed for  delivery and for blood transfusion if necessary.     Findings:   No subcutaneous scarring, no  rectofascial adhesions, no intraabdominal adhesions, no adhesions between bladder and lower uterine segment  Notably tight rectus, fascia, peritoneum requiring sharp dissection  Clear amniotic fluid  Liveborn female infant in cephalic presentation. Born at 0502 on 11/15/24. A single loose nuchal cord, delivered through. Apgars 8 at 1 minute & 9 at 5 minutes. Weight 1640g.  Normal uterus, fallopian tubes, and ovaries.   Cord gasses below     Latest Reference Range & Units 11/15/24 05:13   Ph Cord Arterial 7.16 - 7.39  7.31   PCO2 Cord Arterial 35 - 71 mm Hg 48   PO2 Cord Arterial 10 - 33 mm Hg 17   Bicarbonate Cord Arterial 16 - 24 mmol/L 24   Base Excess/Deficit >-10.0 - -2.0 mmol/L -2.6   Ph Cord Blood Venous 7.21 - 7.45  7.33   PCO2 Cord Venous 27 - 57 mm Hg 49   PO2 Cord Venous 21 - 37 mm Hg 20 (L)   Bicarbonate Cord Venous 16 - 24 mmol/L 26 (H)   Base Excess/Deficit Cord Venous >-10.0 - -2.0 mmol/L -1.0 (H)     Procedure Details:   The patient was brought to the OR, where adequate spinal anesthesia was administered. She was placed in the dorsal supine position with a slight leftward tilt. A wilson catheter was placed in the usual sterile fashion. She was prepped and draped in the usual sterile fashion. A surgical time out was performed. A pfannenstiel skin incision was made with the scalpel, and carried down to the underlying fascia with sharp and blunt dissection. The fascia was incised in the midline, and the incision was extended laterally with the Khan scissors. The superior aspect of the fascia was grasped with the Kocher clamps and dissected off of the underlying rectus muscles with blunt and sharp dissection. Attention was then turned to the inferior aspect of the fascia, which was similarly dissected off of the underlying rectus muscles. The rectus muscles were  in the midline, and the peritoneum was entered bluntly, and the opening was extended with digital pressure and electrocautery. The  bladder blade was placed. A transverse hysterotomy was made with the scalpel in the lower uterine segment, and the incision was extended with digital pressure. The infant was noted to be in the cephalic position, and was delivered atraumatically. The shoulders delivered easily. A loose body nuchal was noted and the infant was delivered through. The cord was doubly clamped and cut after 60 seconds, and the infant was handed off to the awaiting NICU staff. A segment of cord was cut and sent to lab. Cord gasses were collected. The placenta was delivered with gentle traction on the umbilical cord and uterine massage. The placenta was noted to be intact. The uterus was exteriorized and cleared of all clots and debris. Uterine tone was noted to be adequate with 30 units of pitocin given through the running IV and uterine massage. The hysterotomy was closed with a running unlocked suture of 0 Monocryl. The hysterotomy was noted to be hemostatic. The posterior cul-de-sac was cleared of all clots and debris. The uterus was returned to the abdomen. The pericolic gutters were cleared of all clots and debris. The hysterotomy was reexamined and noted to be hemostatic. The fascia and rectus muscles were examined and areas of oozing were controlled with electrocautery. The fascia was closed with a running 0 Vicryl suture. The subcutaneous tissue was irrigated and areas of oozing were controlled with electrocautery. The subcutaneous tissue was less than 2cm in thickness, and was therefore not closed. The skin was closed with a running subcuticular 4-0 Monocryl suture and covered with a sterile dressing.    All sponge, needle, and instrument counts were correct. The patient tolerated the procedure well, and was transferred to recovery in stable condition. Dr. Garcia was present and scrubbed for the procedure.     Regina Spann MD  Obstetrics & Gynecology, PGY-3  11/15/2024 5:57 AM    I was present and scrubbed throughout the  procedure,  I agree with the note above  Mirta Garcia MD

## 2024-11-15 NOTE — PROVIDER NOTIFICATION
11/15/24 0931   Provider Notification   Provider Name/Title Dr. FAUSTINO Montes   Method of Notification Electronic Page   Request Evaluate-Remote   Notification Reason Vital Signs Change  (BP-192/95)     Notified provider about patient's severe range /95 mmHg. Pt denies any symptoms such as headache, vision changes or upper quadrant pain. IV Magnesium maintenance infusion started. Repeating BP after 15 minutes.

## 2024-11-15 NOTE — PROVIDER NOTIFICATION
11/15/24 0317   Provider Notification   Provider Name/Title Dr. Spann   Method of Notification In Department   Notification Reason Status Update;Maternal Vital Sign Change     Provider updated on patients severe range BP's. Attending and resident currrently reviewing patient information to determine if C/S would be appropriate

## 2024-11-16 ENCOUNTER — APPOINTMENT (OUTPATIENT)
Dept: GENERAL RADIOLOGY | Facility: CLINIC | Age: 29
End: 2024-11-16
Payer: COMMERCIAL

## 2024-11-16 VITALS
DIASTOLIC BLOOD PRESSURE: 83 MMHG | SYSTOLIC BLOOD PRESSURE: 136 MMHG | BODY MASS INDEX: 38.02 KG/M2 | WEIGHT: 194.67 LBS | TEMPERATURE: 98 F | OXYGEN SATURATION: 98 % | HEART RATE: 79 BPM | RESPIRATION RATE: 18 BRPM

## 2024-11-16 LAB
ALBUMIN SERPL BCG-MCNC: 2.8 G/DL (ref 3.5–5.2)
ALBUMIN SERPL BCG-MCNC: 2.9 G/DL (ref 3.5–5.2)
ALBUMIN SERPL BCG-MCNC: 3 G/DL (ref 3.5–5.2)
ALP SERPL-CCNC: 127 U/L (ref 40–150)
ALP SERPL-CCNC: 135 U/L (ref 40–150)
ALP SERPL-CCNC: 139 U/L (ref 40–150)
ALT SERPL W P-5'-P-CCNC: 46 U/L (ref 0–50)
ALT SERPL W P-5'-P-CCNC: 48 U/L (ref 0–50)
ALT SERPL W P-5'-P-CCNC: 48 U/L (ref 0–50)
ANION GAP SERPL CALCULATED.3IONS-SCNC: 10 MMOL/L (ref 7–15)
ANION GAP SERPL CALCULATED.3IONS-SCNC: 11 MMOL/L (ref 7–15)
ANION GAP SERPL CALCULATED.3IONS-SCNC: 12 MMOL/L (ref 7–15)
AST SERPL W P-5'-P-CCNC: 47 U/L (ref 0–45)
AST SERPL W P-5'-P-CCNC: 53 U/L (ref 0–45)
AST SERPL W P-5'-P-CCNC: 57 U/L (ref 0–45)
BILIRUB SERPL-MCNC: 0.2 MG/DL
BUN SERPL-MCNC: 23 MG/DL (ref 6–20)
BUN SERPL-MCNC: 28.3 MG/DL (ref 6–20)
BUN SERPL-MCNC: 32 MG/DL (ref 6–20)
CALCIUM SERPL-MCNC: 7.3 MG/DL (ref 8.8–10.4)
CALCIUM SERPL-MCNC: 7.5 MG/DL (ref 8.8–10.4)
CALCIUM SERPL-MCNC: 7.6 MG/DL (ref 8.8–10.4)
CHLORIDE SERPL-SCNC: 96 MMOL/L (ref 98–107)
CHLORIDE SERPL-SCNC: 97 MMOL/L (ref 98–107)
CHLORIDE SERPL-SCNC: 99 MMOL/L (ref 98–107)
CREAT SERPL-MCNC: 0.8 MG/DL (ref 0.51–0.95)
CREAT SERPL-MCNC: 0.8 MG/DL (ref 0.51–0.95)
CREAT SERPL-MCNC: 0.9 MG/DL (ref 0.51–0.95)
EGFRCR SERPLBLD CKD-EPI 2021: 88 ML/MIN/1.73M2
EGFRCR SERPLBLD CKD-EPI 2021: >90 ML/MIN/1.73M2
EGFRCR SERPLBLD CKD-EPI 2021: >90 ML/MIN/1.73M2
ERYTHROCYTE [DISTWIDTH] IN BLOOD BY AUTOMATED COUNT: 11.8 % (ref 10–15)
ERYTHROCYTE [DISTWIDTH] IN BLOOD BY AUTOMATED COUNT: 11.8 % (ref 10–15)
ERYTHROCYTE [DISTWIDTH] IN BLOOD BY AUTOMATED COUNT: 11.9 % (ref 10–15)
GLUCOSE SERPL-MCNC: 91 MG/DL (ref 70–99)
GLUCOSE SERPL-MCNC: 92 MG/DL (ref 70–99)
GLUCOSE SERPL-MCNC: 97 MG/DL (ref 70–99)
HCO3 SERPL-SCNC: 21 MMOL/L (ref 22–29)
HCO3 SERPL-SCNC: 22 MMOL/L (ref 22–29)
HCO3 SERPL-SCNC: 23 MMOL/L (ref 22–29)
HCT VFR BLD AUTO: 32.7 % (ref 35–47)
HCT VFR BLD AUTO: 34.9 % (ref 35–47)
HCT VFR BLD AUTO: 35.5 % (ref 35–47)
HGB BLD-MCNC: 11.5 G/DL (ref 11.7–15.7)
HGB BLD-MCNC: 12 G/DL (ref 11.7–15.7)
HGB BLD-MCNC: 12.1 G/DL (ref 11.7–15.7)
LDH SERPL L TO P-CCNC: 316 U/L (ref 0–250)
MAGNESIUM SERPL-MCNC: 6.1 MG/DL (ref 1.7–2.3)
MAGNESIUM SERPL-MCNC: 6.8 MG/DL (ref 1.7–2.3)
MCH RBC QN AUTO: 29.8 PG (ref 26.5–33)
MCH RBC QN AUTO: 30.5 PG (ref 26.5–33)
MCH RBC QN AUTO: 30.8 PG (ref 26.5–33)
MCHC RBC AUTO-ENTMCNC: 33.8 G/DL (ref 31.5–36.5)
MCHC RBC AUTO-ENTMCNC: 34.7 G/DL (ref 31.5–36.5)
MCHC RBC AUTO-ENTMCNC: 35.2 G/DL (ref 31.5–36.5)
MCV RBC AUTO: 86 FL (ref 78–100)
MCV RBC AUTO: 88 FL (ref 78–100)
MCV RBC AUTO: 90 FL (ref 78–100)
PLATELET # BLD AUTO: 180 10E3/UL (ref 150–450)
PLATELET # BLD AUTO: 204 10E3/UL (ref 150–450)
PLATELET # BLD AUTO: 214 10E3/UL (ref 150–450)
POTASSIUM SERPL-SCNC: 4.3 MMOL/L (ref 3.4–5.3)
POTASSIUM SERPL-SCNC: 4.6 MMOL/L (ref 3.4–5.3)
POTASSIUM SERPL-SCNC: 5.3 MMOL/L (ref 3.4–5.3)
PROT SERPL-MCNC: 5.2 G/DL (ref 6.4–8.3)
PROT SERPL-MCNC: 5.4 G/DL (ref 6.4–8.3)
PROT SERPL-MCNC: 5.6 G/DL (ref 6.4–8.3)
RBC # BLD AUTO: 3.73 10E6/UL (ref 3.8–5.2)
RBC # BLD AUTO: 3.93 10E6/UL (ref 3.8–5.2)
RBC # BLD AUTO: 4.06 10E6/UL (ref 3.8–5.2)
SODIUM SERPL-SCNC: 127 MMOL/L (ref 135–145)
SODIUM SERPL-SCNC: 131 MMOL/L (ref 135–145)
SODIUM SERPL-SCNC: 133 MMOL/L (ref 135–145)
WBC # BLD AUTO: 16.4 10E3/UL (ref 4–11)
WBC # BLD AUTO: 17.9 10E3/UL (ref 4–11)
WBC # BLD AUTO: 18 10E3/UL (ref 4–11)

## 2024-11-16 PROCEDURE — 71045 X-RAY EXAM CHEST 1 VIEW: CPT

## 2024-11-16 PROCEDURE — 120N000003 HC R&B IMCU UMMC

## 2024-11-16 PROCEDURE — 250N000013 HC RX MED GY IP 250 OP 250 PS 637

## 2024-11-16 PROCEDURE — 71045 X-RAY EXAM CHEST 1 VIEW: CPT | Mod: 26 | Performed by: RADIOLOGY

## 2024-11-16 PROCEDURE — 36415 COLL VENOUS BLD VENIPUNCTURE: CPT

## 2024-11-16 PROCEDURE — 84155 ASSAY OF PROTEIN SERUM: CPT

## 2024-11-16 PROCEDURE — 250N000011 HC RX IP 250 OP 636

## 2024-11-16 PROCEDURE — 83735 ASSAY OF MAGNESIUM: CPT

## 2024-11-16 PROCEDURE — 85049 AUTOMATED PLATELET COUNT: CPT

## 2024-11-16 PROCEDURE — 83615 LACTATE (LD) (LDH) ENZYME: CPT

## 2024-11-16 PROCEDURE — 250N000011 HC RX IP 250 OP 636: Performed by: OBSTETRICS & GYNECOLOGY

## 2024-11-16 PROCEDURE — 99232 SBSQ HOSP IP/OBS MODERATE 35: CPT | Mod: GC | Performed by: OBSTETRICS & GYNECOLOGY

## 2024-11-16 PROCEDURE — 82040 ASSAY OF SERUM ALBUMIN: CPT

## 2024-11-16 PROCEDURE — 85014 HEMATOCRIT: CPT

## 2024-11-16 RX ORDER — NIFEDIPINE 30 MG/1
60 TABLET, EXTENDED RELEASE ORAL 2 TIMES DAILY
Status: DISCONTINUED | OUTPATIENT
Start: 2024-11-16 | End: 2024-11-19 | Stop reason: HOSPADM

## 2024-11-16 RX ORDER — HYDROCHLOROTHIAZIDE 25 MG/1
25 TABLET ORAL DAILY
Status: DISCONTINUED | OUTPATIENT
Start: 2024-11-17 | End: 2024-11-19 | Stop reason: HOSPADM

## 2024-11-16 RX ORDER — FUROSEMIDE 10 MG/ML
20 INJECTION INTRAMUSCULAR; INTRAVENOUS ONCE
Status: COMPLETED | OUTPATIENT
Start: 2024-11-16 | End: 2024-11-16

## 2024-11-16 RX ADMIN — ACETAMINOPHEN 975 MG: 325 TABLET, FILM COATED ORAL at 10:14

## 2024-11-16 RX ADMIN — Medication 4 EACH: at 09:15

## 2024-11-16 RX ADMIN — ACETAMINOPHEN 975 MG: 325 TABLET, FILM COATED ORAL at 04:16

## 2024-11-16 RX ADMIN — KETOROLAC TROMETHAMINE 30 MG: 30 INJECTION, SOLUTION INTRAMUSCULAR at 00:06

## 2024-11-16 RX ADMIN — IBUPROFEN 800 MG: 800 TABLET ORAL at 06:01

## 2024-11-16 RX ADMIN — SODIUM ZIRCONIUM CYCLOSILICATE 10 G: 5 POWDER, FOR SUSPENSION ORAL at 02:10

## 2024-11-16 RX ADMIN — SENNOSIDES AND DOCUSATE SODIUM 2 TABLET: 50; 8.6 TABLET ORAL at 11:53

## 2024-11-16 RX ADMIN — IBUPROFEN 800 MG: 800 TABLET ORAL at 18:37

## 2024-11-16 RX ADMIN — OXYCODONE HYDROCHLORIDE 5 MG: 5 TABLET ORAL at 20:31

## 2024-11-16 RX ADMIN — NIFEDIPINE 60 MG: 30 TABLET, FILM COATED, EXTENDED RELEASE ORAL at 06:01

## 2024-11-16 RX ADMIN — SENNOSIDES AND DOCUSATE SODIUM 1 TABLET: 50; 8.6 TABLET ORAL at 20:31

## 2024-11-16 RX ADMIN — SIMETHICONE 80 MG: 80 TABLET, CHEWABLE ORAL at 09:03

## 2024-11-16 RX ADMIN — OXYCODONE HYDROCHLORIDE 5 MG: 5 TABLET ORAL at 13:25

## 2024-11-16 RX ADMIN — SIMETHICONE 80 MG: 80 TABLET, CHEWABLE ORAL at 17:10

## 2024-11-16 RX ADMIN — ACETAMINOPHEN 975 MG: 325 TABLET, FILM COATED ORAL at 17:10

## 2024-11-16 RX ADMIN — OXYCODONE HYDROCHLORIDE 5 MG: 5 TABLET ORAL at 09:14

## 2024-11-16 RX ADMIN — ACETAMINOPHEN 975 MG: 325 TABLET, FILM COATED ORAL at 23:18

## 2024-11-16 RX ADMIN — IBUPROFEN 800 MG: 800 TABLET ORAL at 11:50

## 2024-11-16 RX ADMIN — FUROSEMIDE 20 MG: 10 INJECTION, SOLUTION INTRAMUSCULAR; INTRAVENOUS at 11:50

## 2024-11-16 RX ADMIN — ENOXAPARIN SODIUM 40 MG: 40 INJECTION SUBCUTANEOUS at 10:14

## 2024-11-16 RX ADMIN — NIFEDIPINE 60 MG: 30 TABLET, FILM COATED, EXTENDED RELEASE ORAL at 20:31

## 2024-11-16 NOTE — PROGRESS NOTES
[facility]  Postpartum Progress Note    S: Vanessa is feeling sleepy this morning. Her pain has been generally well controlled on oral pain medications. She is tolerating PO intake without nausea or vomiting. She is voiding spontaneously and has passed flatus. No headaches, vision changes, chest pain, shortness of breath, or RUQ pain. She does still feel puffy, although she feels like the swelling has improved. Breast pumping for baby Teresita in the NICU.    O:  Patient Vitals for the past 4 hrs:   BP Temp Temp src Pulse Resp   11/17/24 1230 (!) 156/100 -- -- -- --   11/17/24 1135 (!) 158/108 98.3  F (36.8  C) Oral 69 18     Results for orders placed or performed during the hospital encounter of 11/11/24 (from the past 24 hours)   CBC with platelets   Result Value Ref Range    WBC Count 11.7 (H) 4.0 - 11.0 10e3/uL    RBC Count 3.94 3.80 - 5.20 10e6/uL    Hemoglobin 11.7 11.7 - 15.7 g/dL    Hematocrit 34.5 (L) 35.0 - 47.0 %    MCV 88 78 - 100 fL    MCH 29.7 26.5 - 33.0 pg    MCHC 33.9 31.5 - 36.5 g/dL    RDW 11.9 10.0 - 15.0 %    Platelet Count 189 150 - 450 10e3/uL   Comprehensive metabolic panel   Result Value Ref Range    Sodium 138 135 - 145 mmol/L    Potassium 4.8 3.4 - 5.3 mmol/L    Carbon Dioxide (CO2) 24 22 - 29 mmol/L    Anion Gap 9 7 - 15 mmol/L    Urea Nitrogen 18.9 6.0 - 20.0 mg/dL    Creatinine 0.76 0.51 - 0.95 mg/dL    GFR Estimate >90 >60 mL/min/1.73m2    Calcium 8.2 (L) 8.8 - 10.4 mg/dL    Chloride 105 98 - 107 mmol/L    Glucose 75 70 - 99 mg/dL    Alkaline Phosphatase 118 40 - 150 U/L    AST 34 0 - 45 U/L    ALT 31 0 - 50 U/L    Protein Total 5.4 (L) 6.4 - 8.3 g/dL    Albumin 2.8 (L) 3.5 - 5.2 g/dL    Bilirubin Total 0.2 <=1.2 mg/dL     Gen: NAD  CV:  RRR, no murmurs  Pulm:  CTAB, no wheezes or crackles  Abd:  Soft, non-tender  Incision: Covered by steri strips which are clean, dry, intact. No surrounding erythema, no drainage  Ext:  2-3+ pitting edema up to level of  knee    I/O:  I/O last 3 completed shifts:  In:  [P.O.:]  Out: 6205 [Urine:6205]    A/P:  Vanessa Turcios is a 29 year old  on PPD#2 s/p PLTCS, with pregnancy complicated by preeclampsia with severe features. Blood pressures predominantly normotensive with occasional low mild range BPs. Meeting early postoperative goals.     # Postpartum cares  - Pain: Continue scheduled acetaminophen, ibuprofen, and prn oxycodone  - Heme: Appropriate blood loss during surgery. No s/s of ongoing blood loss.     - GI: Bowel regimen. PRN simethicone QID. PRN antiemetics.  - : S/p wilson   - PNC: Rh positive, Rubella immune. No interventions indicated.   - Breast pumping; no issues  - Contraception: Defers at this time. Discussed recommended pregnancy spacing of 18 months.   - PPx: Encourage ambulation, IS, SCDs while confined to bed    # Preeclampsia with severe features (BP, HA)  - BP: Mild range, last SSR >24 hours ago   - Meds: planning to start HCTZ today, IV lasix as needed for swelling  - O2 requirements: satting well on room air at time of visit   - Symptoms: Denies  - IV lab 20/40/80 (, 11/15), hydral 10 (, , x5, 11/15 2135)  - S/p 2D lab 400 TID [discontinued d/t hyperK]; s/p 1D HCTZ, 2D nifed 30/60> D#2 nifed 60/60  - S/p 24hrs Mg (0430 )   - AST 49>57>> 34, ALT 47>54>> 31, Cr 0.98> 0.76, o/w HELLP nl (). Labs all normalized today  - HypoNa, 127>> 8  - HyperK, 5.9>> 4.6> 4.3> 4.8  - CXR : no focal opacities, no edema, lung fields clear     Medically Ready for Discharge: Pending blood pressures, clinical course. Anticipate tomorrow vs .    Clarke Montes MD  OB/GYN PGY-3  2024 2:14 PM    I have seen and examined the patient. I have reviewed and agree with the note. POD#2 s/p PLTCS. PreE w/ SF. Labs now normal. BP control somewhat challenging, especially given previous electrolyte abnormalities in setting of diuretics and betablockers. Will continue to uptitrate BP  medication and check electrolytes prior to discharge. Adding back hydrochlorothiazide and labetalol today.   Delilah Sol MD

## 2024-11-16 NOTE — PROGRESS NOTES
Summary:  Vanessa in [id number] at 0845 via cart accompanied by Kim RN. Report given by ADOLFO Cooper.  Pt feeling nauseas and not well per pt.  Elevated BP and  bradycardic.  No reported HA, vision changes, or epigastric pain.  Generalized edema in face/hands/feet/legs of +2-+3.  Reflexes +1 upper/lower. No clonus.  Dr Wilson updated at desk.  Plan for magnesium sulfate 2 gm continuous.  Per Dr. Wilson at 0913, restart magnesium sulfate at 2 gm with no loading dose.  Magnesium sulfate started with RN double check with ADOLFO Mast.  Pt nauseaus and vomiting prior to magnesium sulfate.  Zofran given by ADOLFO Cooper.  Continued N/V - reglan given as ordered with relief.  Dr Wilson and residents notifiied throughout shift of BP/P/VS/I/O.  Dr Stanton at bedside at 1615 for PreE assessment and to answer questions.  Dr Stanton reviewed holistic meds and stated pt may continue to take.  Pt may continue valtrex or not her choice.  Labetalol po held now.  Calhoun catheter to remain in while magnesium sulfate infusing.  Updated on I/O - will have repeat labs at 2000.  Pt unsure if flatus.  +BS.  Given mylicon.  Plan to ambulate and start breast pumping as VS stable and pt feels she is ready.  Reports no sleep last night.  See note to Dr Wilson regarding O2 stats.  Greater than 95% after incentive spirometer initiated and maintained.  Clear lung sounds.  R 14-16.  Comfortable with tylenol and toradal.  Initially tylenol held due to N/V and pt's request.  Teresita is in the NICU at 31.6 wks.  Parents have ability to view her on computer if they want.  Call light is within reach.  Shift change report to ADOLFO juárez who double checked magnesium sulfate.  Family has been orientated to room/routines.

## 2024-11-16 NOTE — CONSULTS
"Consult received for Vascular access care.  See LDA for details. For additional needs place \"Nursing to Consult for Vascular Access\" [id number] order in EPIC.  "

## 2024-11-16 NOTE — CONSULTS
"Social Work Initial Consult    2024    DATA/ASSESSMENT    HPI  Vanessa Rivera Turcios is a 29 year old  on PPD#0 s/p PLTCS, with pregnancy complicated by preeclampsia with severe features.     General Information  Assessment completed with: Parents, Vanessa and Ang  Type of visit: Initial Assessment      Reason for Consult:  Coping and resources    Living Environment:   Primary caregiver:    Lives with: Ang  Unique Family Situation: Mother feels delivery was traumatic due to surprise birth and not what family had anticipated or planned   Current living arrangements: house          Able to return to prior arrangements: yes     Family Factors  Family Risk Factors:  First child, baby born premature, baby in NICU  Family Strength Factors:  Partnered, intergenerational support, employment, stable housing      Assessment of Support  Parental Marital Status: Domestic Partnership, paternal grandmother present        Employment/Financial  Patient's caregiver works full/part time: Yes           Coping/Stress  Mother has skills for addressing, \"undiagnosed anxiety and trauma from delivery\", and is interested in additional support, such as therapist and link to [facility].  Vanessa is calm, able to articulate her needs, has support from her partner, Ang.     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       INTERVENTION  Conducted chart review and consulted with medical team regarding plan of care. Introduced SW role and scope of practice.   Provided assessment of patient and family's level of coping  Provided solution-focused therapeutic support  Validated emotions and provided supportive listening  Facilitated service linkage with hospital and community resources  Provided outpatient mental health / chemical dependency resources    Provided SW contact info    Resources  [facility]  [facility] " "[facility]  [email or web address]  [facility]      PLAN  SW will continue to follow for supportive intervention.     Ana HERRERA, LICSW  Vocera: [phone]  \"No Letter\"          "

## 2024-11-16 NOTE — PROGRESS NOTES
[facility]  Magnesium Check Note    S:  Patient is feeling ok overall. Resting comfortably in bed, able to sleep intermittently.  Denies new headache, vision changes/spots in vision, dizziness, chest pain, palpitations, shortness of breath, RUQ or epigastric pain. Feeling less anxious compared to earlier in the evening. Comfortable with plan for repeat lab draw at 0400.     O:  Patient Vitals for the past 4 hrs:   BP Temp Temp src Resp SpO2   11/16/24 0330 -- -- -- -- 96 %   11/16/24 0329 -- -- -- -- 92 %   11/16/24 0300 119/72 -- -- -- 93 %   11/16/24 0200 (!) 158/91 -- -- -- 95 %   11/16/24 0115 (!) 140/92 97.7  F (36.5  C) Oral 16 93 %   11/16/24 0031 (!) 141/93 -- -- -- 92 %   11/16/24 0005 139/83 -- -- -- 92 %     Results for orders placed or performed during the hospital encounter of 11/11/24 (from the past 24 hours)   POC US Guidance Needle Placement    Impression    Bilateral TAP Block   ABO/Rh type and screen *Canceled*    Narrative    The following orders were created for panel order ABO/Rh type and screen.  Procedure                               Abnormality         Status                     ---------                               -----------         ------                       Please view results for these tests on the individual orders.   CBC with Platelets & Differential *Canceled*    Narrative    The following orders were created for panel order CBC with Platelets & Differential.  Procedure                               Abnormality         Status                     ---------                               -----------         ------                       Please view results for these tests on the individual orders.   ABO/Rh type and screen *Canceled*    Narrative    The following orders were created for panel order ABO/Rh type and screen.  Procedure                               Abnormality         Status                     ---------                                -----------         ------                       Please view results for these tests on the individual orders.   CBC with Platelets & Differential *Canceled*    Narrative    The following orders were created for panel order CBC with Platelets & Differential.  Procedure                               Abnormality         Status                     ---------                               -----------         ------                       Please view results for these tests on the individual orders.   CBC with platelets   Result Value Ref Range    WBC Count 18.9 (H) 4.0 - 11.0 10e3/uL    RBC Count 3.90 3.80 - 5.20 10e6/uL    Hemoglobin 11.7 11.7 - 15.7 g/dL    Hematocrit 35.0 35.0 - 47.0 %    MCV 90 78 - 100 fL    MCH 30.0 26.5 - 33.0 pg    MCHC 33.4 31.5 - 36.5 g/dL    RDW 11.9 10.0 - 15.0 %    Platelet Count 193 150 - 450 10e3/uL   Comprehensive metabolic panel   Result Value Ref Range    Sodium 130 (L) 135 - 145 mmol/L    Potassium 5.9 (H) 3.4 - 5.3 mmol/L    Carbon Dioxide (CO2) 18 (L) 22 - 29 mmol/L    Anion Gap 10 7 - 15 mmol/L    Urea Nitrogen 31.8 (H) 6.0 - 20.0 mg/dL    Creatinine 0.98 (H) 0.51 - 0.95 mg/dL    GFR Estimate 80 >60 mL/min/1.73m2    Calcium 8.5 (L) 8.8 - 10.4 mg/dL    Chloride 102 98 - 107 mmol/L    Glucose 111 (H) 70 - 99 mg/dL    Alkaline Phosphatase 132 40 - 150 U/L    AST 57 (H) 0 - 45 U/L    ALT 54 (H) 0 - 50 U/L    Protein Total 5.3 (L) 6.4 - 8.3 g/dL    Albumin 2.9 (L) 3.5 - 5.2 g/dL    Bilirubin Total 0.2 <=1.2 mg/dL   Magnesium   Result Value Ref Range    Magnesium 6.0 (H) 1.7 - 2.3 mg/dL   CBC with platelets   Result Value Ref Range    WBC Count 16.8 (H) 4.0 - 11.0 10e3/uL    RBC Count 3.61 (L) 3.80 - 5.20 10e6/uL    Hemoglobin 10.9 (L) 11.7 - 15.7 g/dL    Hematocrit 31.6 (L) 35.0 - 47.0 %    MCV 88 78 - 100 fL    MCH 30.2 26.5 - 33.0 pg    MCHC 34.5 31.5 - 36.5 g/dL    RDW 11.9 10.0 - 15.0 %    Platelet Count 187 150 - 450 10e3/uL   Comprehensive metabolic panel   Result Value Ref Range     Sodium 129 (L) 135 - 145 mmol/L    Potassium 5.8 (H) 3.4 - 5.3 mmol/L    Carbon Dioxide (CO2) 20 (L) 22 - 29 mmol/L    Anion Gap 9 7 - 15 mmol/L    Urea Nitrogen 33.3 (H) 6.0 - 20.0 mg/dL    Creatinine 0.98 (H) 0.51 - 0.95 mg/dL    GFR Estimate 80 >60 mL/min/1.73m2    Calcium 7.8 (L) 8.8 - 10.4 mg/dL    Chloride 100 98 - 107 mmol/L    Glucose 117 (H) 70 - 99 mg/dL    Alkaline Phosphatase 127 40 - 150 U/L    AST 50 (H) 0 - 45 U/L    ALT 49 0 - 50 U/L    Protein Total 5.1 (L) 6.4 - 8.3 g/dL    Albumin 2.7 (L) 3.5 - 5.2 g/dL    Bilirubin Total <0.2 <=1.2 mg/dL   Magnesium   Result Value Ref Range    Magnesium 6.1 (H) 1.7 - 2.3 mg/dL   CBC with platelets   Result Value Ref Range    WBC Count 17.9 (H) 4.0 - 11.0 10e3/uL    RBC Count 3.73 (L) 3.80 - 5.20 10e6/uL    Hemoglobin 11.5 (L) 11.7 - 15.7 g/dL    Hematocrit 32.7 (L) 35.0 - 47.0 %    MCV 88 78 - 100 fL    MCH 30.8 26.5 - 33.0 pg    MCHC 35.2 31.5 - 36.5 g/dL    RDW 11.8 10.0 - 15.0 %    Platelet Count 204 150 - 450 10e3/uL   Magnesium   Result Value Ref Range    Magnesium 6.8 (H) 1.7 - 2.3 mg/dL   Comprehensive metabolic panel   Result Value Ref Range    Sodium 127 (L) 135 - 145 mmol/L    Potassium 5.3 3.4 - 5.3 mmol/L    Carbon Dioxide (CO2) 21 (L) 22 - 29 mmol/L    Anion Gap 10 7 - 15 mmol/L    Urea Nitrogen 32.0 (H) 6.0 - 20.0 mg/dL    Creatinine 0.90 0.51 - 0.95 mg/dL    GFR Estimate 88 >60 mL/min/1.73m2    Calcium 7.6 (L) 8.8 - 10.4 mg/dL    Chloride 96 (L) 98 - 107 mmol/L    Glucose 92 70 - 99 mg/dL    Alkaline Phosphatase 127 40 - 150 U/L    AST 57 (H) 0 - 45 U/L    ALT 48 0 - 50 U/L    Protein Total 5.2 (L) 6.4 - 8.3 g/dL    Albumin 2.8 (L) 3.5 - 5.2 g/dL    Bilirubin Total 0.2 <=1.2 mg/dL         Gen: NAD  CV:  RRR, no murmurs  Pulm:  CTAB, no wheezes or crackles  Abd:  Soft, non-tender  Ext:  Patellar reflexes 2+ b/l, no clonus b/l, 2+ LE edema b/l    I/O:  I/O last 3 completed shifts:  In: 2365 [P.O.:1620; I.V.:745]  Out: 1268 [Urine:950;  Blood:318]    A/P:  Vanessa Turcios is a 29 year old  on PPD#0 s/p PLTCS, with pregnancy complicated by preeclampsia with severe features. Hyperkalemia improving overnight 5.9 > 5.8 > 5.3. However does have worsening hyponatremia with most recent Na 127. ICU and medicine curbsided with recommendations for trending labs and ultimately ICU admissions if her electrolyte derangements become more pronounced on subsequent checks, next planned for 0400. Also notified on this check of intermittent O2 requirement of 2L while asleep in order to maintain O2 saturation >95%, patient asymptomatic and lungs continue to sound clear on examination but will order Mg level with next set of labs.     Preeclampsia with severe features  - BP: Mild range, last SSR at 2340     - UOP: 0.90 ml/kg/hr over the last 4 hours. Will keep wilson in and continue maintenance fluids until PO intake and UOP improves.  - Symptoms: Denies  - HELLP labs notable for Cr. 0.98 o/w wnl. Repeat labs next at 0800   - Mag sulfate for seizure prophylaxis: 2g/hr  - Mg to be discontinued at 0500     Postpartum:  routine postpartum care    Dipak Pena MD   Obstetrics & Gynecology, PGY-2  2024 4:00 AM

## 2024-11-16 NOTE — PROGRESS NOTES
Anesthesia Postpartum  Section with Spinal Anesthesia    Patient: Vanessa Turcios    Patient location: Postpartum floor    Chief complaint: Acute postoperative pain management s/p spinal anesthetic.    Procedure(s) Performed:  Procedure(s):   section    Anesthesia type: Spinal Block    Subjective  Resting comfortably at this time in bed. Pain adequately controlled by PO medications, but can be exacerbated to 3-4/10 with ambulation. Denies pruritis, weakness, paresthesias, difficulties breathing or voiding, and HA. However, she endorsees N/V yesterday (immediately post-op) which has continued to improve over her stay She is able to ambulate and tolerates a regular diet.     Objective  Respiratory Function (RR / SpO2 / Airway Patency): Satisfactory, ORA.  Cardiac Function (HR / Rhythm / BP): Satisfactory, HDS w/o vasoactive agents  Strength and sensation lower extremities: Normal b/l  Site of spinal/epidural insertion: No signs of infection or inflammation    Most recent vitals  /86 (BP Location: Right arm, Patient Position: Semi-Gustafson's, Cuff Size: Adult Large)   Pulse 67   Temp 36.8  C (98.3  F) (Oral)   Resp 18   Wt 88.3 kg (194 lb 10.7 oz)   LMP 04/10/2024   SpO2 96%   Breastfeeding Unknown   BMI 38.02 kg/m      Assessment and plan  Vanessa Turcios is a 29 year old female  POD #1 s/p No admission procedures for hospital encounter. with intrathecal 0.75% bupivacaine (1.6 mL), fentanyl (15mcg), and morphine (150mcg) and single shot TAP nerve block injections with bupivacaine 0.25% 10mL and long acting liposome bupivacaine (Exparel) 1.3% bilaterally. She is ambulating without difficulty without weakness or paresthesias. There is no evidence of adverse side effects associated with spinal or fascial plane block injections. However, she did endorse a brief episode of N/V yesterday postoperative, but her symptoms have since improved. The patient is receiving adequate incisional  pain control at this time and anticipate up to 72 hours of incisional pain control. However, we further anticipate that the patient may require opioid and non-opioid analgesics for visceral and muscle pain that is not controlled with local anesthetic.      In brief summary, her postoperative analgesia is adequately controlled today. Further interventional analgesic strategies would be of little utility at this time. Thus, we recommend proceeding with PO analgesics including staggered dosing of NSAIDs and acetaminophen with a taper of oxycodone.     Thank you for including us in the care for this patient. If there are any concerns please contact the department of anesthesia OB division [phone]    Bethel Mueller MD CA-2   Department of Anesthesiology  November 16, 2024  4:01 PM

## 2024-11-16 NOTE — PROVIDER NOTIFICATION
11/16/24 0130   Provider Notification   Provider Name/Title Dr. Pena and Dr. Mayo   Method of Notification Electronic Page   Request Evaluate-Remote   Notification Reason Other  (Transfer status)     ICU just called me. Do you know definitively what the plan is yet?     At 0131, Dr. Mayo replied that all the doctors are in deliveries and will huddle when able. Will update RN.     Estelita Power RN on 11/16/2024 at 8:57 AM

## 2024-11-16 NOTE — PLAN OF CARE
Goal Outcome Evaluation:         Data: patient began to have hypertensive crisis at 2100 - two doses of hydralazine given and BP went down to mild range. Magnesium 2g/hr infusing and tolerating. Reflexes 2+, 1 beat of clonus In right leg. Postpartum checks within normal limits - see flow record. Patient eating and drinking normally. Ambulated once with assist of one. Calhoun draining adequate amounts of urine. No apparent signs of infection. Incision healing well. Patient performing self cares and is able to care for infant.  Action: Patient medicated during the shift for pain. See MAR. Patient reassessed within 1 hour after each medication and pain was improved - patient stated she was comfortable.   Response: Positive attachment behaviors observed with infant. Support persons Ang present.   Plan: pt to be transferred to medicine floor for tele monitoring due to high potassium levels.   Anticipate discharge on 11/18-11/19.

## 2024-11-16 NOTE — PROGRESS NOTES
[facility]  Postpartum Progress Note    S:  Patient is feeling much better sine Mg was discontinued. Able to get up and ambulate to restroom without dizziness, spontaneously voiding. Passing flatus. Tolerating PO food and fluids. Passing flatus. Denies new headache, vision changes/spots in vision, dizziness, chest pain, palpitations, shortness of breath, RUQ or epigastric pain.     O:  Patient Vitals for the past 4 hrs:   BP Temp Temp src Resp SpO2 Weight   11/16/24 0824 -- -- -- -- 96 % --   11/16/24 0807 -- -- -- -- 93 % 88.3 kg (194 lb 10.7 oz)   11/16/24 0700 -- -- -- -- 95 % --   11/16/24 0647 -- -- -- -- 96 % --   11/16/24 0630 -- -- -- -- 96 % --   11/16/24 0623 -- -- -- -- 95 % --   11/16/24 0601 120/84 -- -- -- -- --   11/16/24 0600 -- -- -- -- 95 % --   11/16/24 0545 -- -- -- -- 95 % --   11/16/24 0532 -- -- -- -- 95 % --   11/16/24 0530 -- -- -- -- 93 % --   11/16/24 0503 -- -- -- -- 95 % --   11/16/24 0500 131/86 98.2  F (36.8  C) Axillary 16 94 % --   11/16/24 0452 -- -- -- -- 95 % --     Results for orders placed or performed during the hospital encounter of 11/11/24 (from the past 24 hours)   CBC with platelets   Result Value Ref Range    WBC Count 18.9 (H) 4.0 - 11.0 10e3/uL    RBC Count 3.90 3.80 - 5.20 10e6/uL    Hemoglobin 11.7 11.7 - 15.7 g/dL    Hematocrit 35.0 35.0 - 47.0 %    MCV 90 78 - 100 fL    MCH 30.0 26.5 - 33.0 pg    MCHC 33.4 31.5 - 36.5 g/dL    RDW 11.9 10.0 - 15.0 %    Platelet Count 193 150 - 450 10e3/uL   Comprehensive metabolic panel   Result Value Ref Range    Sodium 130 (L) 135 - 145 mmol/L    Potassium 5.9 (H) 3.4 - 5.3 mmol/L    Carbon Dioxide (CO2) 18 (L) 22 - 29 mmol/L    Anion Gap 10 7 - 15 mmol/L    Urea Nitrogen 31.8 (H) 6.0 - 20.0 mg/dL    Creatinine 0.98 (H) 0.51 - 0.95 mg/dL    GFR Estimate 80 >60 mL/min/1.73m2    Calcium 8.5 (L) 8.8 - 10.4 mg/dL    Chloride 102 98 - 107 mmol/L    Glucose 111 (H) 70 - 99 mg/dL    Alkaline Phosphatase 132 40  - 150 U/L    AST 57 (H) 0 - 45 U/L    ALT 54 (H) 0 - 50 U/L    Protein Total 5.3 (L) 6.4 - 8.3 g/dL    Albumin 2.9 (L) 3.5 - 5.2 g/dL    Bilirubin Total 0.2 <=1.2 mg/dL   Magnesium   Result Value Ref Range    Magnesium 6.0 (H) 1.7 - 2.3 mg/dL   CBC with platelets   Result Value Ref Range    WBC Count 16.8 (H) 4.0 - 11.0 10e3/uL    RBC Count 3.61 (L) 3.80 - 5.20 10e6/uL    Hemoglobin 10.9 (L) 11.7 - 15.7 g/dL    Hematocrit 31.6 (L) 35.0 - 47.0 %    MCV 88 78 - 100 fL    MCH 30.2 26.5 - 33.0 pg    MCHC 34.5 31.5 - 36.5 g/dL    RDW 11.9 10.0 - 15.0 %    Platelet Count 187 150 - 450 10e3/uL   Comprehensive metabolic panel   Result Value Ref Range    Sodium 129 (L) 135 - 145 mmol/L    Potassium 5.8 (H) 3.4 - 5.3 mmol/L    Carbon Dioxide (CO2) 20 (L) 22 - 29 mmol/L    Anion Gap 9 7 - 15 mmol/L    Urea Nitrogen 33.3 (H) 6.0 - 20.0 mg/dL    Creatinine 0.98 (H) 0.51 - 0.95 mg/dL    GFR Estimate 80 >60 mL/min/1.73m2    Calcium 7.8 (L) 8.8 - 10.4 mg/dL    Chloride 100 98 - 107 mmol/L    Glucose 117 (H) 70 - 99 mg/dL    Alkaline Phosphatase 127 40 - 150 U/L    AST 50 (H) 0 - 45 U/L    ALT 49 0 - 50 U/L    Protein Total 5.1 (L) 6.4 - 8.3 g/dL    Albumin 2.7 (L) 3.5 - 5.2 g/dL    Bilirubin Total <0.2 <=1.2 mg/dL   Magnesium   Result Value Ref Range    Magnesium 6.1 (H) 1.7 - 2.3 mg/dL   CBC with platelets   Result Value Ref Range    WBC Count 17.9 (H) 4.0 - 11.0 10e3/uL    RBC Count 3.73 (L) 3.80 - 5.20 10e6/uL    Hemoglobin 11.5 (L) 11.7 - 15.7 g/dL    Hematocrit 32.7 (L) 35.0 - 47.0 %    MCV 88 78 - 100 fL    MCH 30.8 26.5 - 33.0 pg    MCHC 35.2 31.5 - 36.5 g/dL    RDW 11.8 10.0 - 15.0 %    Platelet Count 204 150 - 450 10e3/uL   Magnesium   Result Value Ref Range    Magnesium 6.8 (H) 1.7 - 2.3 mg/dL   Comprehensive metabolic panel   Result Value Ref Range    Sodium 127 (L) 135 - 145 mmol/L    Potassium 5.3 3.4 - 5.3 mmol/L    Carbon Dioxide (CO2) 21 (L) 22 - 29 mmol/L    Anion Gap 10 7 - 15 mmol/L    Urea Nitrogen 32.0 (H) 6.0  - 20.0 mg/dL    Creatinine 0.90 0.51 - 0.95 mg/dL    GFR Estimate 88 >60 mL/min/1.73m2    Calcium 7.6 (L) 8.8 - 10.4 mg/dL    Chloride 96 (L) 98 - 107 mmol/L    Glucose 92 70 - 99 mg/dL    Alkaline Phosphatase 127 40 - 150 U/L    AST 57 (H) 0 - 45 U/L    ALT 48 0 - 50 U/L    Protein Total 5.2 (L) 6.4 - 8.3 g/dL    Albumin 2.8 (L) 3.5 - 5.2 g/dL    Bilirubin Total 0.2 <=1.2 mg/dL   Comprehensive metabolic panel   Result Value Ref Range    Sodium 131 (L) 135 - 145 mmol/L    Potassium 4.6 3.4 - 5.3 mmol/L    Carbon Dioxide (CO2) 22 22 - 29 mmol/L    Anion Gap 12 7 - 15 mmol/L    Urea Nitrogen 28.3 (H) 6.0 - 20.0 mg/dL    Creatinine 0.80 0.51 - 0.95 mg/dL    GFR Estimate >90 >60 mL/min/1.73m2    Calcium 7.3 (L) 8.8 - 10.4 mg/dL    Chloride 97 (L) 98 - 107 mmol/L    Glucose 91 70 - 99 mg/dL    Alkaline Phosphatase 135 40 - 150 U/L    AST 53 (H) 0 - 45 U/L    ALT 48 0 - 50 U/L    Protein Total 5.4 (L) 6.4 - 8.3 g/dL    Albumin 2.9 (L) 3.5 - 5.2 g/dL    Bilirubin Total 0.2 <=1.2 mg/dL   CBC with platelets   Result Value Ref Range    WBC Count 18.0 (H) 4.0 - 11.0 10e3/uL    RBC Count 3.93 3.80 - 5.20 10e6/uL    Hemoglobin 12.0 11.7 - 15.7 g/dL    Hematocrit 35.5 35.0 - 47.0 %    MCV 90 78 - 100 fL    MCH 30.5 26.5 - 33.0 pg    MCHC 33.8 31.5 - 36.5 g/dL    RDW 11.9 10.0 - 15.0 %    Platelet Count 180 150 - 450 10e3/uL   Lactate Dehydrogenase   Result Value Ref Range    Lactate Dehydrogenase 316 (H) 0 - 250 U/L   Magnesium   Result Value Ref Range    Magnesium 6.1 (H) 1.7 - 2.3 mg/dL   XR Chest Port 1 View    Impression    RESIDENT PRELIMINARY INTERPRETATION  Impression: No acute airspace disease.          Gen: NAD  CV:  RRR, no murmurs  Pulm:  CTAB, no wheezes or crackles  Abd:  Soft, non-tender  Ext:  Patellar reflexes 2+ b/l, no clonus b/l, 2+ LE edema b/l    I/O:  I/O last 3 completed shifts:  In: 4264 [P.O.:2470; I.V.:1794]  Out: 4900 [Urine:4900]    A/P:  Vanessa Turcios is a 29 year old  on PPD#0 s/p PLTCS,  with pregnancy complicated by preeclampsia with severe features. Hyperkalemia improving overnight 5.9 > 5.8 > 5.3 > 4.7, hyponatremia likewise improving 127 >> 131. Medicine and ICU curbside consulted overnight and agree with continued serial labs but defer need for ICU. Continues to have ongoing O2 requirement of 2L, chest XR without evidence of edema and lung exam remains clear. Blood pressures normotensive and stable this AM. Meeting early postoperative goals, will continue to advance today now that wilson has been removed and more mobile.     # Postpartum/Postop  - Pain: Continue scheduled acetaminophen, ibuprofen, and prn oxycodone  - Heme: Appropriate blood loss during surgery. No s/s of ongoing blood loss.     - GI: Bowel regimen. PRN simethicone QID. PRN antiemetics.  - : S/p wilson   - PNC: Rh positive, Rubella immune. No interventions indicated.   - Breast feeding; no issues  - Contraception: Not discussed. Discussed recommended pregnancy spacing of 18 months.   - PPx: Encourage ambulation, IS, SCDs while confined to bed    # Preeclampsia with severe features  - BP: Mild range, last SSR at 2340     - UOP: 0.90 ml/kg/hr over the last 4 hours. Will keep wilson in and continue maintenance fluids until PO intake and UOP improves.  - Symptoms: Denies  PreE w/ SF (BP, HA)  - AST 49>57>50, ALT 47>54>49, Cr 0.98, o/w HELLP nl (11/15)  - IV lab 20/40/80 (11/11, 11/15), hydral 10 (11/11, 11/14, x5 11/15 2135)  - s/p 2D lab 400 TID [discontinued d/t hyperK]; s/p 1D HCTZ, 2D nifed 30/60> D#1 nifed 60/60  - S/p 24hrs Mg (0430 11/16)   - HypoNa, Na 127> 131  - HyperK, 5.9>>4.6  - S/p 24hrs Mg  - CXR 11/16: no focal opacities, no edema, lung fields clear     Dipak Pena MD   Obstetrics & Gynecology, PGY-2  11/16/2024 8:33 AM     /86 (BP Location: Right arm, Patient Position: Semi-Gustafson's, Cuff Size: Adult Large)   Pulse 67   Temp 98.3  F (36.8  C) (Oral)   Resp 18   Wt 88.3 kg (194 lb 10.7 oz)   LMP  04/10/2024   SpO2 96%   Breastfeeding Unknown   BMI 38.02 kg/m      Hemoglobin   Date Value Ref Range Status   11/16/2024 12.1 11.7 - 15.7 g/dL Final     K+ 4.3, Na 133    The patient was seen and examined by me separately from the team.  I have reviewed and agree with the above note.  She is doing ok overall-has been up to the bathroom a few times, tolerated PO without nausea.  Does not feel short of breath despite O2 requirement.  She has significant LE edema on exam, notes that her hands feel swollen as well.  Will given lasix 20 mg IV x 1 now, may need to repeat this evening depending on UOP and if continued need for O2.  Start hydrochlorothiazide 25 mg daily tomorrow.  Continue to closely monitor her blood pressure and respiratory status.      Ana Smith MD, FACOG

## 2024-11-16 NOTE — PLAN OF CARE
Goal Outcome Evaluation:      Plan of Care Reviewed With: patient, spouse    Overall Patient Progress: improvingOverall Patient Progress: improving    Outcome Evaluation: Patient treated for hypertensive crisis overnight and monitoring sodium and potassium closely as overnight, she was hyperkalemic and hyponatremic.    Shift: [id number]: Shift handoff report received from Lilian Eugene RN.    Data: Postpartum  from 11/15/24. Hypertensive crisis overnight, received IV hydralazine, blood pressures responded to hydralazine and continued to be elevated until this morning. Her blood pressure medication has been increased. Oxygen saturation level persisted less than 95%, oxygen administered via Nasal Cannula and reached up to 5L/minute this morning. Magnesium sulfate discontinued and chest xray performed this morning with no significant findings. Patient encouraged activity out of bed and incentive spirometer use. Uterus midline, firm. Lochia scant rubra, blood clots not present. Vanessa Who Turcios  voiding without difficulty after wilson catheter removal, up with stand-by assist utilizing mobile oxygen, eating and drinking without nausea. Incision site covered in dry dressing, no signs or symptoms of infection.  Pumping with moderate assistance. Taking acetaminophen and ketorolac/ibuprofen for pain and reports adequate pain management. Plan of care reviewed with patient and spouse; both state understandable and are agreeable with her plan of care. Unable to visit  in NICU due to patient's unstable condition overnight. Support person present.    Problem: Postpartum ( Delivery)  Goal: Effective Oxygenation and Ventilation  Outcome: Not Progressing     Problem: Adult Inpatient Plan of Care  Goal: Plan of Care Review  Description: The Plan of Care Review/Shift note should be completed every shift.  The Outcome Evaluation is a brief statement about your assessment that the patient is improving,  "declining, or no change.  This information will be displayed automatically on your shift  note.  Outcome: Progressing  Flowsheets (Taken 2024)  Outcome Evaluation: Patient treated for hypertensive crisis overnight and monitoring sodium and potassium closely as overnight, she was hyperkalemic and hyponatremic.  Plan of Care Reviewed With:   patient   spouse  Overall Patient Progress: improving  Goal: Patient-Specific Goal (Individualized)  Description: You can add care plan individualizations to a care plan. Examples of Individualization might be:  \"Parent requests to be called daily at 9am for status\", \"I have a hard time hearing out of my right ear\", or \"Do not touch me to wake me up as it startles  me\".  Outcome: Progressing  Flowsheets (Taken 2024)  Individualized Care Needs: calming guided meditation with body scan during periods of anxiety, has own diffuser and essential oils, Pre-eclampsia post magnesium sulfate therapy  Anxieties, Fears or Concerns: blood pressure readings, sodium and potassium levels  Patient/Family-Specific Goals (Include Timeframe): pain level 2 out of 10 or less after interventions, sodium levels continue to increase to normal  Goal: Absence of Hospital-Acquired Illness or Injury  Outcome: Progressing  Goal: Optimal Comfort and Wellbeing  Outcome: Progressing  Intervention: Monitor Pain and Promote Comfort  Recent Flowsheet Documentation  Taken 2024 0500 by Estelita Power RN  Pain Management Interventions:   aromatherapy   around-the-clock dosing utilized   care clustered   distraction   environmental changes   emotional support   rest   relaxation techniques promoted   repositioned  Goal: Readiness for Transition of Care  Outcome: Progressing     Problem: Hypertensive Disorders in Pregnancy  Goal: Patient-Fetal Stabilization  Outcome: Progressing     Problem: Hospitalized  Patient  Goal: Optimal Patient-Fetal Wellbeing  Outcome: Progressing   "   Problem: Postpartum ( Delivery)  Goal: Successful Parent Role Transition  Outcome: Progressing  Goal: Hemostasis  Outcome: Progressing  Goal: Effective Bowel Elimination  Outcome: Progressing  Goal: Fluid and Electrolyte Balance  Outcome: Progressing  Goal: Absence of Infection Signs and Symptoms  Outcome: Progressing  Goal: Anesthesia/Sedation Recovery  Outcome: Progressing  Goal: Optimal Pain Control and Function  Outcome: Progressing  Intervention: Prevent or Manage Pain  Recent Flowsheet Documentation  Taken 2024 0500 by Estelita Power, RN  Pain Management Interventions:   aromatherapy   around-the-clock dosing utilized   care clustered   distraction   environmental changes   emotional support   rest   relaxation techniques promoted   repositioned  Goal: Nausea and Vomiting Relief  Outcome: Progressing  Goal: Effective Urinary Elimination  Outcome: Progressing       Estelita Power RN on 2024 at 8:16 AM

## 2024-11-16 NOTE — PROVIDER NOTIFICATION
11/15/24 0930   Provider Notification   Provider Name/Title Dr SANJUANITA Wilson   Method of Notification Phone   Notification Reason Vital Signs Change     Notified of elevated BP and P less than 60.  Hydralazine prn given.  Notified at 0955 that 20 minute post hydralazine given /102 and P 48.  Plan to do frequent BP checks.  At 1000, notified of N/V and that was prior given zofran.  Dr Wilson ordered Reglan.  Updated on BP/P.  At 1155, notified of sleeping after Reglan and has resolved N/V.  Respirations 16 but O2 stats while resting 92-94%.  Asked for parameters/interventions.  At noon, started incentive spirometer use with O2 stats 95%.

## 2024-11-16 NOTE — PROVIDER NOTIFICATION
11/15/24 0900   Provider Notification   Provider Name/Title dr wilson   Method of Notification In Department     At 0913, updated on VS.  Per Dr. Wilson, magnesium sulfate at 2 gm continuous to be restarted.

## 2024-11-16 NOTE — PROGRESS NOTES
[facility]  Magnesium Check Note    S:  Patient is feeling well overall but is understandable exhausted.  Denies new headache, vision changes/spots in vision, dizziness, chest pain, palpitations, shortness of breath, RUQ or epigastric pain.    O:  Patient Vitals for the past 4 hrs:   BP Temp Temp src Pulse Resp SpO2   11/15/24 2203 (!) 148/95 -- -- -- -- 95 %   11/15/24 2144 (!) 159/91 -- -- -- -- 96 %   11/15/24 2128 (!) 165/94 -- -- -- -- 96 %   11/15/24 2127 -- -- -- -- -- 96 %   11/15/24 2107 (!) 162/94 -- -- -- -- 96 %   11/15/24 2050 (!) 161/94 98.3  F (36.8  C) Oral 50 18 --   11/15/24 2000 (!) 141/87 98.1  F (36.7  C) Oral -- 16 96 %   11/15/24 1945 -- -- -- -- -- 96 %   11/15/24 1900 137/87 -- -- -- -- 95 %   11/15/24 1819 -- -- -- -- -- 97 %     Results for orders placed or performed during the hospital encounter of 11/11/24 (from the past 24 hours)   CBC with platelets   Result Value Ref Range    WBC Count 14.2 (H) 4.0 - 11.0 10e3/uL    RBC Count 3.86 3.80 - 5.20 10e6/uL    Hemoglobin 11.5 (L) 11.7 - 15.7 g/dL    Hematocrit 33.3 (L) 35.0 - 47.0 %    MCV 86 78 - 100 fL    MCH 29.8 26.5 - 33.0 pg    MCHC 34.5 31.5 - 36.5 g/dL    RDW 12.0 10.0 - 15.0 %    Platelet Count 196 150 - 450 10e3/uL   Comprehensive metabolic panel   Result Value Ref Range    Sodium 136 135 - 145 mmol/L    Potassium 4.7 3.4 - 5.3 mmol/L    Carbon Dioxide (CO2) 20 (L) 22 - 29 mmol/L    Anion Gap 11 7 - 15 mmol/L    Urea Nitrogen 28.8 (H) 6.0 - 20.0 mg/dL    Creatinine 0.89 0.51 - 0.95 mg/dL    GFR Estimate 90 >60 mL/min/1.73m2    Calcium 9.1 8.8 - 10.4 mg/dL    Chloride 105 98 - 107 mmol/L    Glucose 97 70 - 99 mg/dL    Alkaline Phosphatase 147 40 - 150 U/L    AST 49 (H) 0 - 45 U/L    ALT 47 0 - 50 U/L    Protein Total 5.2 (L) 6.4 - 8.3 g/dL    Albumin 2.9 (L) 3.5 - 5.2 g/dL    Bilirubin Total <0.2 <=1.2 mg/dL   Creatinine   Result Value Ref Range    Creatinine 0.89 0.51 - 0.95 mg/dL    GFR Estimate 90 >60  mL/min/1.73m2   ABO/Rh type and screen *Canceled*    Narrative    The following orders were created for panel order ABO/Rh type and screen.  Procedure                               Abnormality         Status                     ---------                               -----------         ------                       Please view results for these tests on the individual orders.   CBC with Platelets & Differential *Canceled*    Narrative    The following orders were created for panel order CBC with Platelets & Differential.  Procedure                               Abnormality         Status                     ---------                               -----------         ------                       Please view results for these tests on the individual orders.   CBC with platelets   Result Value Ref Range    WBC Count 18.9 (H) 4.0 - 11.0 10e3/uL    RBC Count 3.90 3.80 - 5.20 10e6/uL    Hemoglobin 11.7 11.7 - 15.7 g/dL    Hematocrit 35.0 35.0 - 47.0 %    MCV 90 78 - 100 fL    MCH 30.0 26.5 - 33.0 pg    MCHC 33.4 31.5 - 36.5 g/dL    RDW 11.9 10.0 - 15.0 %    Platelet Count 193 150 - 450 10e3/uL   Comprehensive metabolic panel   Result Value Ref Range    Sodium 130 (L) 135 - 145 mmol/L    Potassium 5.9 (H) 3.4 - 5.3 mmol/L    Carbon Dioxide (CO2) 18 (L) 22 - 29 mmol/L    Anion Gap 10 7 - 15 mmol/L    Urea Nitrogen 31.8 (H) 6.0 - 20.0 mg/dL    Creatinine 0.98 (H) 0.51 - 0.95 mg/dL    GFR Estimate 80 >60 mL/min/1.73m2    Calcium 8.5 (L) 8.8 - 10.4 mg/dL    Chloride 102 98 - 107 mmol/L    Glucose 111 (H) 70 - 99 mg/dL    Alkaline Phosphatase 132 40 - 150 U/L    AST 57 (H) 0 - 45 U/L    ALT 54 (H) 0 - 50 U/L    Protein Total 5.3 (L) 6.4 - 8.3 g/dL    Albumin 2.9 (L) 3.5 - 5.2 g/dL    Bilirubin Total 0.2 <=1.2 mg/dL   Magnesium   Result Value Ref Range    Magnesium 6.0 (H) 1.7 - 2.3 mg/dL   CBC with platelets   Result Value Ref Range    WBC Count 16.8 (H) 4.0 - 11.0 10e3/uL    RBC Count 3.61 (L) 3.80 - 5.20 10e6/uL    Hemoglobin  10.9 (L) 11.7 - 15.7 g/dL    Hematocrit 31.6 (L) 35.0 - 47.0 %    MCV 88 78 - 100 fL    MCH 30.2 26.5 - 33.0 pg    MCHC 34.5 31.5 - 36.5 g/dL    RDW 11.9 10.0 - 15.0 %    Platelet Count 187 150 - 450 10e3/uL   Comprehensive metabolic panel   Result Value Ref Range    Sodium 129 (L) 135 - 145 mmol/L    Potassium 5.8 (H) 3.4 - 5.3 mmol/L    Carbon Dioxide (CO2) 20 (L) 22 - 29 mmol/L    Anion Gap 9 7 - 15 mmol/L    Urea Nitrogen 33.3 (H) 6.0 - 20.0 mg/dL    Creatinine 0.98 (H) 0.51 - 0.95 mg/dL    GFR Estimate 80 >60 mL/min/1.73m2    Calcium 7.8 (L) 8.8 - 10.4 mg/dL    Chloride 100 98 - 107 mmol/L    Glucose 117 (H) 70 - 99 mg/dL    Alkaline Phosphatase 127 40 - 150 U/L    AST 50 (H) 0 - 45 U/L    ALT 49 0 - 50 U/L    Protein Total 5.1 (L) 6.4 - 8.3 g/dL    Albumin 2.7 (L) 3.5 - 5.2 g/dL    Bilirubin Total <0.2 <=1.2 mg/dL   Magnesium   Result Value Ref Range    Magnesium 6.1 (H) 1.7 - 2.3 mg/dL         Gen: NAD  CV:  RRR, no murmurs  Pulm:  CTAB, no wheezes or crackles  Abd:  Soft, non-tender  Ext:  Patellar and brachioradialis reflexes 1+ b/l, no clonus b/l, 2+ LE edema b/l    I/O:  I/O last 3 completed shifts:  In: 1146.7 [P.O.:750; I.V.:396.7]  Out: 818 [Urine:500; Blood:318]    A/P:  Vanessa Turcios is a 29 year old  on PPD#0 s/p PLTCS, with pregnancy complicated by preeclampsia with severe features. Labs this afternoon notable for K 5.8 > 5.9. Given hyperkalemia will obtain STAT EKG, administer dose of IV lasix, discontinue labatelol, discontinue lactated ringers, and start hydrochlorothiazide and normal saline maintenance fluids. Will plan for telemetry overnight to monitor for arrhythmia and trend K with next set of labs at 0200. No signs/symptoms of Mg toxicity on this check, UOP appropriate.     Preeclampsia with severe features  - BP: sustained severe ranges this evening requiring IV hydralazine 10mg    - UOP: 0.40 ml/kg/hr over the last 4 hours  Creatinine is also increased at 0.98 (baseline is 0.62  and has uptrended since admission). Will repeat labs at 2000. Will keep wilson in and continue maintenance fluids until PO intake and UOP improves.  - Symptoms: Denies  - HELLP labs notable for Cr. 0.98 o/w wnl. Repeat labs next at 0200   - Mag sulfate for seizure prophylaxis: 2g/hr  - Next clinical mag check at 0200    Postpartum:  routine postpartum care    Dipak Pena MD   Obstetrics & Gynecology, PGY-2  11/16/2024 8:26 AM

## 2024-11-16 NOTE — PROVIDER NOTIFICATION
11/15/24 1406   Provider Notification   Provider Name/Title dr Montes   Method of Notification Electronic Page     Updated on BP/P and that tolerating magnesium sulfate.  N/V resolved after Reglan.  2 hr urine 50 ml.  Magnesium sulfate 50 ml and LR had increased from 25 to 50 ml.  Pt is tolerating po hydration.  As N/V resolved.  Asked if labs were scheduled.

## 2024-11-16 NOTE — PROGRESS NOTES
"Social Work Progress Note      November 16, 2024      DATA  Attempted to meet with patient.  Nurse shared that mother is sleepy and asked sw to attempt to see family later today.           INTERVENTION  Conducted chart review and consulted with medical team regarding plan of care.      PLAN  Will attempt to see patient/family later today  Continue care. Writer will continue to follow and provide support throughout admission.     Ana HERRERA, [facility]  Vocera: [phone]  \"No Letter\"             "

## 2024-11-16 NOTE — PROVIDER NOTIFICATION
11/16/24 0159   Provider Notification   Provider Name/Title Dr. Mayo   Method of Notification Electronic Page   Request Evaluate-Remote   Notification Reason Medication Request  (Lokelma)     Lokelma was ordered at midnight and was not given by previous nurse. Did you still want it administered or hold off until after labs and plan of care reviewed? Also, update on her blood pressures: 141/93, 140/92, and 158/91. Oxygen saturation persistently below 95 while sleeping so oxygen applied 2L/min via nasal cannula and is now at 95% oxygen saturation.     At 02:02 , Dr. Mayo replied to have RN administer the Lokelma.       Estelita Power RN on 11/16/2024 at 9:06 AM

## 2024-11-16 NOTE — PROVIDER NOTIFICATION
11/15/24 1525   Provider Notification   Provider Name/Title dr killian   Method of Notification Electronic Page     Updated on BP/P.  P less than 60 - ranges from 48-50's.  Labs are resulting.  Scheduled for Labetolol at 1600 - asked if to hold due to pulse.

## 2024-11-16 NOTE — PROVIDER NOTIFICATION
11/16/24 0345   Provider Notification   Provider Name/Title Dr. Dipak Pena   Method of Notification At Bedside   Request Evaluate in Person   Notification Reason Status Update  (O2 saturation and oxygen administration, last 4 hours of blood pressure readings.)     Dr. Dipak Pena at bedside evaluating patient on magnesium sulfate. Provider updated on last 4 hours of blood pressures, oxygen saturation levels intermittently down to 92% and now on 4L of oxygen per minute via nasal cannula with O2 saturations of 95-96%.     At 0433, Dr. Pena paged RN to notify that Dr. Sol advised to discontinue magnesium sulfate now and have bedside CXR performed.       Estelita Power, RN on 11/16/2024 at 4:40 AM

## 2024-11-17 LAB
ALBUMIN SERPL BCG-MCNC: 2.8 G/DL (ref 3.5–5.2)
ALP SERPL-CCNC: 118 U/L (ref 40–150)
ALT SERPL W P-5'-P-CCNC: 31 U/L (ref 0–50)
ANION GAP SERPL CALCULATED.3IONS-SCNC: 9 MMOL/L (ref 7–15)
AST SERPL W P-5'-P-CCNC: 34 U/L (ref 0–45)
BILIRUB SERPL-MCNC: 0.2 MG/DL
BUN SERPL-MCNC: 18.9 MG/DL (ref 6–20)
CALCIUM SERPL-MCNC: 8.2 MG/DL (ref 8.8–10.4)
CHLORIDE SERPL-SCNC: 105 MMOL/L (ref 98–107)
CREAT SERPL-MCNC: 0.76 MG/DL (ref 0.51–0.95)
EGFRCR SERPLBLD CKD-EPI 2021: >90 ML/MIN/1.73M2
ERYTHROCYTE [DISTWIDTH] IN BLOOD BY AUTOMATED COUNT: 11.9 % (ref 10–15)
GLUCOSE SERPL-MCNC: 75 MG/DL (ref 70–99)
HCO3 SERPL-SCNC: 24 MMOL/L (ref 22–29)
HCT VFR BLD AUTO: 34.5 % (ref 35–47)
HGB BLD-MCNC: 11.7 G/DL (ref 11.7–15.7)
MCH RBC QN AUTO: 29.7 PG (ref 26.5–33)
MCHC RBC AUTO-ENTMCNC: 33.9 G/DL (ref 31.5–36.5)
MCV RBC AUTO: 88 FL (ref 78–100)
PLATELET # BLD AUTO: 189 10E3/UL (ref 150–450)
POTASSIUM SERPL-SCNC: 4.8 MMOL/L (ref 3.4–5.3)
PROT SERPL-MCNC: 5.4 G/DL (ref 6.4–8.3)
RBC # BLD AUTO: 3.94 10E6/UL (ref 3.8–5.2)
SODIUM SERPL-SCNC: 138 MMOL/L (ref 135–145)
WBC # BLD AUTO: 11.7 10E3/UL (ref 4–11)

## 2024-11-17 PROCEDURE — 250N000013 HC RX MED GY IP 250 OP 250 PS 637

## 2024-11-17 PROCEDURE — 250N000011 HC RX IP 250 OP 636

## 2024-11-17 PROCEDURE — 84075 ASSAY ALKALINE PHOSPHATASE: CPT

## 2024-11-17 PROCEDURE — 250N000013 HC RX MED GY IP 250 OP 250 PS 637: Performed by: OBSTETRICS & GYNECOLOGY

## 2024-11-17 PROCEDURE — 250N000013 HC RX MED GY IP 250 OP 250 PS 637: Performed by: STUDENT IN AN ORGANIZED HEALTH CARE EDUCATION/TRAINING PROGRAM

## 2024-11-17 PROCEDURE — 120N000003 HC R&B IMCU UMMC

## 2024-11-17 PROCEDURE — 99232 SBSQ HOSP IP/OBS MODERATE 35: CPT | Mod: GC | Performed by: STUDENT IN AN ORGANIZED HEALTH CARE EDUCATION/TRAINING PROGRAM

## 2024-11-17 PROCEDURE — 84132 ASSAY OF SERUM POTASSIUM: CPT

## 2024-11-17 PROCEDURE — 36415 COLL VENOUS BLD VENIPUNCTURE: CPT

## 2024-11-17 PROCEDURE — 85014 HEMATOCRIT: CPT

## 2024-11-17 RX ORDER — LABETALOL 200 MG/1
200 TABLET, FILM COATED ORAL 3 TIMES DAILY
Status: DISCONTINUED | OUTPATIENT
Start: 2024-11-17 | End: 2024-11-17

## 2024-11-17 RX ORDER — LABETALOL 300 MG/1
300 TABLET, FILM COATED ORAL 3 TIMES DAILY
Status: DISCONTINUED | OUTPATIENT
Start: 2024-11-17 | End: 2024-11-18

## 2024-11-17 RX ADMIN — LABETALOL HYDROCHLORIDE 300 MG: 300 TABLET, FILM COATED ORAL at 20:30

## 2024-11-17 RX ADMIN — IBUPROFEN 800 MG: 800 TABLET ORAL at 18:47

## 2024-11-17 RX ADMIN — OXYCODONE HYDROCHLORIDE 5 MG: 5 TABLET ORAL at 08:09

## 2024-11-17 RX ADMIN — IBUPROFEN 800 MG: 800 TABLET ORAL at 05:35

## 2024-11-17 RX ADMIN — ACETAMINOPHEN 975 MG: 325 TABLET, FILM COATED ORAL at 18:46

## 2024-11-17 RX ADMIN — IBUPROFEN 800 MG: 800 TABLET ORAL at 23:59

## 2024-11-17 RX ADMIN — ACETAMINOPHEN 975 MG: 325 TABLET, FILM COATED ORAL at 11:38

## 2024-11-17 RX ADMIN — IBUPROFEN 800 MG: 800 TABLET ORAL at 11:41

## 2024-11-17 RX ADMIN — Medication 4 EACH: at 08:10

## 2024-11-17 RX ADMIN — SIMETHICONE 80 MG: 80 TABLET, CHEWABLE ORAL at 08:09

## 2024-11-17 RX ADMIN — ACETAMINOPHEN 975 MG: 325 TABLET, FILM COATED ORAL at 23:59

## 2024-11-17 RX ADMIN — HYDROXYZINE HYDROCHLORIDE 50 MG: 50 TABLET, FILM COATED ORAL at 00:33

## 2024-11-17 RX ADMIN — IBUPROFEN 800 MG: 800 TABLET ORAL at 00:28

## 2024-11-17 RX ADMIN — LABETALOL HYDROCHLORIDE 200 MG: 200 TABLET, FILM COATED ORAL at 14:41

## 2024-11-17 RX ADMIN — HYDROCHLOROTHIAZIDE 25 MG: 25 TABLET ORAL at 08:09

## 2024-11-17 RX ADMIN — OXYCODONE HYDROCHLORIDE 5 MG: 5 TABLET ORAL at 22:00

## 2024-11-17 RX ADMIN — ACETAMINOPHEN 975 MG: 325 TABLET, FILM COATED ORAL at 05:35

## 2024-11-17 RX ADMIN — NIFEDIPINE 60 MG: 30 TABLET, FILM COATED, EXTENDED RELEASE ORAL at 08:09

## 2024-11-17 RX ADMIN — NIFEDIPINE 60 MG: 30 TABLET, FILM COATED, EXTENDED RELEASE ORAL at 20:30

## 2024-11-17 RX ADMIN — ENOXAPARIN SODIUM 40 MG: 40 INJECTION SUBCUTANEOUS at 11:39

## 2024-11-17 RX ADMIN — OXYCODONE HYDROCHLORIDE 5 MG: 5 TABLET ORAL at 15:53

## 2024-11-17 RX ADMIN — Medication 200 MG: at 22:04

## 2024-11-17 NOTE — PLAN OF CARE
Goal Outcome Evaluation:      Plan of Care Reviewed With: patient, spouse    Overall Patient Progress: improvingOverall Patient Progress: improving    Problem: Postpartum ( Delivery)  Goal: Effective Oxygenation and Ventilation  Outcome: Unable to Meet  Intervention: Optimize Oxygenation and Ventilation  Recent Flowsheet Documentation  Taken 2024 0750 by Carmen Boothe  Head of Bed (HOB) Positioning: HOB at 30-45 degrees  Attempted to ween O2, unable to maintain adequate oxygenation for long periods of time. Encouraged ambulating, and incentive spirometer. Maternal assessments WDL. Pumping for feedings. Baby girl Teresita in NICU, she was able to visit today. Spouse Ang at the bed side, supportive in cares. Blood pressure stable throughout shift, trending up towards end of shift. Continue with POC.

## 2024-11-17 NOTE — PLAN OF CARE
Goal Outcome Evaluation:    VSS and postpartum assessments WDL except one mild range blood pressure when patient was having increased anxiety. No s/s of pre-e. Reflexes unchanged, no conus. Up ad reinaldo with steady gait and independent with cares. Pumping independently.  Pain managed with tylenol, ibuprofen, and oxycodone x1 overnight. Patient's mom present and supportive. Will continue with postpartum cares and education per plan of care.

## 2024-11-17 NOTE — PLAN OF CARE
Goal Outcome Evaluation:      Plan of Care Reviewed With: patient, spouse    Overall Patient Progress: improvingOverall Patient Progress: improving    Problem: Hypertensive Disorders in Pregnancy  Goal: Patient-Fetal Stabilization  Outcome: Unable to Meet     VSS, except BP. Pt denies preeclampsia symptoms throughout shift. O2 has stabilized today. Maternal assessments WDL. Up ab reinaldo. Voiding independently. Pumping on cue for feedings. Spouse, mother and father at the bedside and supportive of patient. Was able to visit baby girl Teresita in NICU. Continue with POC.

## 2024-11-18 LAB
ANION GAP SERPL CALCULATED.3IONS-SCNC: 10 MMOL/L (ref 7–15)
ATRIAL RATE - MUSE: 61 BPM
BUN SERPL-MCNC: 22 MG/DL (ref 6–20)
CALCIUM SERPL-MCNC: 8.3 MG/DL (ref 8.8–10.4)
CHLORIDE SERPL-SCNC: 105 MMOL/L (ref 98–107)
CREAT SERPL-MCNC: 0.77 MG/DL (ref 0.51–0.95)
DIASTOLIC BLOOD PRESSURE - MUSE: NORMAL MMHG
EGFRCR SERPLBLD CKD-EPI 2021: >90 ML/MIN/1.73M2
GLUCOSE SERPL-MCNC: 81 MG/DL (ref 70–99)
HCO3 SERPL-SCNC: 23 MMOL/L (ref 22–29)
INTERPRETATION ECG - MUSE: NORMAL
P AXIS - MUSE: 55 DEGREES
POTASSIUM SERPL-SCNC: 4.7 MMOL/L (ref 3.4–5.3)
PR INTERVAL - MUSE: 168 MS
QRS DURATION - MUSE: 80 MS
QT - MUSE: 438 MS
QTC - MUSE: 440 MS
R AXIS - MUSE: 96 DEGREES
SODIUM SERPL-SCNC: 138 MMOL/L (ref 135–145)
SYSTOLIC BLOOD PRESSURE - MUSE: NORMAL MMHG
T AXIS - MUSE: 60 DEGREES
VENTRICULAR RATE- MUSE: 61 BPM

## 2024-11-18 PROCEDURE — 36415 COLL VENOUS BLD VENIPUNCTURE: CPT

## 2024-11-18 PROCEDURE — 999N000127 HC STATISTIC PERIPHERAL IV START W US GUIDANCE

## 2024-11-18 PROCEDURE — 250N000011 HC RX IP 250 OP 636: Performed by: OBSTETRICS & GYNECOLOGY

## 2024-11-18 PROCEDURE — 80048 BASIC METABOLIC PNL TOTAL CA: CPT

## 2024-11-18 PROCEDURE — 250N000011 HC RX IP 250 OP 636

## 2024-11-18 PROCEDURE — 99232 SBSQ HOSP IP/OBS MODERATE 35: CPT | Mod: GC | Performed by: OBSTETRICS & GYNECOLOGY

## 2024-11-18 PROCEDURE — 250N000013 HC RX MED GY IP 250 OP 250 PS 637: Performed by: OBSTETRICS & GYNECOLOGY

## 2024-11-18 PROCEDURE — 250N000013 HC RX MED GY IP 250 OP 250 PS 637

## 2024-11-18 PROCEDURE — 120N000003 HC R&B IMCU UMMC

## 2024-11-18 RX ORDER — LABETALOL 200 MG/1
400 TABLET, FILM COATED ORAL 3 TIMES DAILY
Status: DISCONTINUED | OUTPATIENT
Start: 2024-11-18 | End: 2024-11-18

## 2024-11-18 RX ORDER — NIFEDIPINE 30 MG/1
60 TABLET, EXTENDED RELEASE ORAL ONCE
Status: COMPLETED | OUTPATIENT
Start: 2024-11-18 | End: 2024-11-18

## 2024-11-18 RX ORDER — HYDRALAZINE HYDROCHLORIDE 20 MG/ML
INJECTION INTRAMUSCULAR; INTRAVENOUS
Status: COMPLETED
Start: 2024-11-18 | End: 2024-11-18

## 2024-11-18 RX ORDER — LABETALOL 300 MG/1
600 TABLET, FILM COATED ORAL EVERY 8 HOURS SCHEDULED
Status: DISCONTINUED | OUTPATIENT
Start: 2024-11-18 | End: 2024-11-18

## 2024-11-18 RX ORDER — LABETALOL 200 MG/1
400 TABLET, FILM COATED ORAL ONCE
Status: DISCONTINUED | OUTPATIENT
Start: 2024-11-18 | End: 2024-11-18

## 2024-11-18 RX ORDER — LABETALOL 200 MG/1
200 TABLET, FILM COATED ORAL ONCE
Status: COMPLETED | OUTPATIENT
Start: 2024-11-18 | End: 2024-11-18

## 2024-11-18 RX ORDER — LABETALOL 300 MG/1
600 TABLET, FILM COATED ORAL EVERY 8 HOURS
Status: DISCONTINUED | OUTPATIENT
Start: 2024-11-19 | End: 2024-11-19

## 2024-11-18 RX ADMIN — LABETALOL HYDROCHLORIDE 400 MG: 200 TABLET, FILM COATED ORAL at 08:52

## 2024-11-18 RX ADMIN — IBUPROFEN 800 MG: 800 TABLET ORAL at 18:50

## 2024-11-18 RX ADMIN — NIFEDIPINE 60 MG: 30 TABLET, FILM COATED, EXTENDED RELEASE ORAL at 20:32

## 2024-11-18 RX ADMIN — HYDROCHLOROTHIAZIDE 25 MG: 25 TABLET ORAL at 08:52

## 2024-11-18 RX ADMIN — Medication 4 EACH: at 08:54

## 2024-11-18 RX ADMIN — ENOXAPARIN SODIUM 40 MG: 40 INJECTION SUBCUTANEOUS at 09:29

## 2024-11-18 RX ADMIN — LABETALOL HYDROCHLORIDE 600 MG: 300 TABLET, FILM COATED ORAL at 18:02

## 2024-11-18 RX ADMIN — LABETALOL HYDROCHLORIDE 200 MG: 200 TABLET, FILM COATED ORAL at 09:29

## 2024-11-18 RX ADMIN — ACETAMINOPHEN 975 MG: 325 TABLET, FILM COATED ORAL at 12:35

## 2024-11-18 RX ADMIN — SIMETHICONE 80 MG: 80 TABLET, CHEWABLE ORAL at 14:50

## 2024-11-18 RX ADMIN — HYDRALAZINE HYDROCHLORIDE 10 MG: 20 INJECTION INTRAMUSCULAR; INTRAVENOUS at 09:08

## 2024-11-18 RX ADMIN — ACETAMINOPHEN 975 MG: 325 TABLET, FILM COATED ORAL at 06:00

## 2024-11-18 RX ADMIN — ACETAMINOPHEN 975 MG: 325 TABLET, FILM COATED ORAL at 18:50

## 2024-11-18 RX ADMIN — IBUPROFEN 800 MG: 800 TABLET ORAL at 12:35

## 2024-11-18 RX ADMIN — IBUPROFEN 800 MG: 800 TABLET ORAL at 06:00

## 2024-11-18 RX ADMIN — OXYCODONE HYDROCHLORIDE 5 MG: 5 TABLET ORAL at 15:47

## 2024-11-18 RX ADMIN — NIFEDIPINE 60 MG: 30 TABLET, FILM COATED, EXTENDED RELEASE ORAL at 05:09

## 2024-11-18 RX ADMIN — OXYCODONE HYDROCHLORIDE 5 MG: 5 TABLET ORAL at 09:33

## 2024-11-18 NOTE — PLAN OF CARE
Goal Outcome Evaluation:      Plan of Care Reviewed With: patient    Overall Patient Progress: improvingOverall Patient Progress: improving    Outcome Evaluation: progressing well, working on BP control with meds    Data: Vital signs within normal limits except for mildly elevated BP - meds given as ordered. Postpartum checks within normal limits - see flow record. Patient eating and drinking normally. Patient able to empty bladder independently and is up ambulating. No apparent signs of infection. Incision healing well. Patient performing self cares and is pumping for infant in NICU. Right and left skin abrasions noted on lower abdomen near where the dressing tape was at - appears to be healing with no drainage.  Action: Patient medicated during the shift for pain. See MAR. Patient reassessed within 1 hour after each medication and pain was improved - patient stated she was comfortable. Patient education done about breast pumping. See flow record.  Response: Positive attachment behaviors observed with infant. Support persons mom present.   Plan: Anticipate discharge on 11/18-11/19.

## 2024-11-18 NOTE — PROGRESS NOTES
[facility]  Postpartum Progress Note    S: Vanessa is feeling well this morning. Her pain has been generally well controlled on oral pain medications. She is tolerating PO intake without nausea or vomiting. She is voiding spontaneously and has passed flatus. No headaches, vision changes, chest pain, shortness of breath, or RUQ pain. Swelling has improved significantly. Pumping for baby Teresita in the NICU. Desires natural family planning, declines contraception.    O:  Patient Vitals for the past 4 hrs:   BP   24 0500 (!) 150/97   24 0445 (!) 153/90   24 0430 (!) 148/92     BP (!) 150/97   Pulse 68   Temp 98  F (36.7  C) (Oral)   Resp 16   Wt 84 kg (185 lb 3.2 oz)   LMP 04/10/2024   SpO2 91%   Breastfeeding Unknown   BMI 36.17 kg/m      Gen: NAD  CV:  RRR, no murmurs  Pulm:  CTAB, no wheezes or crackles  Abd:  Soft, non-tender  Incision: Covered by steri strips which are clean, dry, intact. No surrounding erythema, no drainage  Ext:  2+ pitting edema in feet, 1+ non-pitting up to level of knee    I/O:  I/O last 3 completed shifts:  In: 3500 [P.O.:3500]  Out: 2400 [Urine:2400]    A/P:  Vanessa Turcios is a 29 year old  on PPD#3 s/p PLTCS, with pregnancy complicated by preeclampsia with severe features. Blood pressures predominantly high mild range over the last 24 hours with occasional low mild range BPs. Meeting early postoperative goals.     # Postpartum cares  - Pain: Continue scheduled acetaminophen, ibuprofen, and prn oxycodone  - Heme: Appropriate blood loss during surgery. No s/s of ongoing blood loss.     - GI: Bowel regimen. PRN simethicone QID. PRN antiemetics.  - : S/p wilson, voiding spontaneously  - PNC: Rh positive, Rubella immune. No interventions indicated.   - Breast pumping; no issues  - Contraception: Desires to use natural family planning, ovulation test strips etc to avoid pregnancy. Discussed recommended pregnancy spacing of 18 months.   -  PPx: Encourage ambulation, IS, SCDs while confined to bed. Lovenox while inpatient.    # Preeclampsia with severe features (BP, HA)  - BP: High mild range overnight, last SSR >24 hours ago   - Meds: S/p 2D lab 400 TID [discontinued d/t hyperK], have been uptitrating nifedipine, followed by HCTZ, followed by labetalol. Now on D#3 nifed 60/60, D#2 HCTZ, D#1 lab 400 TID. Non-sustained severe range BP overnight, therefore uptitrated labetalol overnight due to persistently elevated BP, and gave morning dose of nifedipine early.  - IV lab 20/40/80 (11/11, 11/15), hydral 10 (11/11, 11/14, x5, 11/15 2135)  - S/p 24hrs Mg (0430 11/16)   - O2 requirements: satting well on room air at time of visit, no oxygen requirement overnight  - Symptoms: Denies  - AST 49>57>> 34, ALT 47>54>> 31, Cr 0.98> 0.76, o/w HELLP nl (11/17). Labs all normalized PPD#2  - HypoNa, 127>131>133  - HyperK, 5.9>> 4.6> 4.3> 4.8  - CXR 11/16: no focal opacities, no edema, lung fields clear     Medically Ready for Discharge: Pending blood pressure control    Regina Spann MD  OB/GYN PGY-3  11/18/2024 8:19 AM    BP (!) 138/93 (BP Location: Right arm, Patient Position: Semi-Gustafson's, Cuff Size: Adult Regular)   Pulse 75   Temp 97.9  F (36.6  C) (Oral)   Resp 16   Wt 84 kg (185 lb 3.2 oz)   LMP 04/10/2024   SpO2 91%   Breastfeeding Unknown   BMI 36.17 kg/m      Weight 174 lbs 11/16 --> 185 lbs yesterday, not weighed yet today    Hemoglobin   Date Value Ref Range Status   11/17/2024 11.7 11.7 - 15.7 g/dL Final         Last Comprehensive Metabolic Panel:  Lab Results   Component Value Date     11/18/2024    POTASSIUM 4.7 11/18/2024    CHLORIDE 105 11/18/2024    CO2 23 11/18/2024    ANIONGAP 10 11/18/2024    GLC 81 11/18/2024    BUN 22.0 (H) 11/18/2024    CR 0.77 11/18/2024    GFRESTIMATED >90 11/18/2024    BJ 8.3 (L) 11/18/2024      The patient was seen and examined by me separately from the team.  I have reviewed and agree with the above note.   She is feeling physically well, anxious and a little upset at the moment as she is now on the BP protocol after being treated with IV hydralazine for severe range BP.  Discussed that elevated blood pressures are expected in the course of pre-eclampsia with severe features.  She has had an excellent diuresis since delivery, her labs are all normal today.  Will increase her labetalol to 600 mg TID.  If she still needs additional antihypertensive treatment, will start PO hydralazine.  Discussed that she will need to stay at least another a day or two until her BP is under better control and stable for outpatient management.  She is agreeable to this plan.     Ana Smith MD, FACOG

## 2024-11-18 NOTE — PROVIDER NOTIFICATION
11/18/24 0405   Provider Notification   Provider Name/Title Dr Spann   Method of Notification Electronic Page   Request Evaluate - Remote   Notification Reason Maternal Vital Sign Change     Provider notified of SR BP. See flowsheets for details. Plan for q15 minute recheck x1h or as needed per results. Pt aware.     First two rechecks below SR. Plan to continue with q15 rechecks and give scheduled labetolol per provider.     Addendum: D/t pts HR being below 60, plan to hold labetalol until previously scheduled 0800. Plan to give nifedipine early. Re timed per provider.

## 2024-11-18 NOTE — PROGRESS NOTES
"SPIRITUAL HEALTH SERVICES Progress Note  [facility] ([facility]) [facility]    Encountered Vanessa and Ang in [facility] as they were headed to visit Teresita in NICU. They shared that Vanessa is doing much better today, though discharge was delayed due to increasing BP again. Vanessa describes feeling better and less puffy, and is hopeful to return home where she and Ang can rest more fully. Ang named that rest has been difficult for him here, as he remains mindful of Vanessa's blood pressures. Vanessa felt emotional yesterday, as she was able to hold Teresita for the first time, \"I could cry just thinking about it!\" Vanessa and Ang are coping well and welcome continued  support.     Annette Block MDiv  Associate   Pager [phone]    * [facility] remains available 24/7 for emergent requests/referrals, either by having the switchboard page the on-call  or by entering an ASAP/STAT consult in Epic (this will also page the on-call ). Routine Epic consults receive an initial response within 24 hours.*    "

## 2024-11-18 NOTE — PLAN OF CARE
Problem: Hypertensive Disorders in Pregnancy  Goal: Patient-Fetal Stabilization  Outcome: Not Progressing   Goal Outcome Evaluation:      Plan of Care Reviewed With: patient    Overall Patient Progress: no changeOverall Patient Progress: no change     BP's in severe range this am, improving over the course of the shift. Ambulating in room. Pain managed with scheduled medications. Oxycodone given x1 for breakthrough pain. Pumping for  in NICU.  Ang at bedside attentive to patient.

## 2024-11-18 NOTE — PROGRESS NOTES
SR BP noted overnight. See previous note for details. Provider aware. Pt back to normotensive or mild range BP. Denies all other pre-e symtpoms. Pain controlled with scheduled tylenol and ibuprofen overnight. Pt declined need for oxycodone. Incision open to air, clean, dry, and intact. Pt up and moving around the room frequently. Voiding appropriately. Pumping ~ q4 hours for infant in NICU. No questions or concerns from pt at this time.

## 2024-11-18 NOTE — LACTATION NOTE
This note was copied from a baby's chart.  Lactation Follow Up Note    Reason for visit/ call/ message:  Dispense rental pump prior to discharge    Supply:  Pumping every few hours, getting ~10mls    Significant changes (medications, equipment, comfort, etc):  Discharge home tomorrow    Hand Hugs/ Skin to Skin/ Oral Care/ Nuzzling/     Education given:  Discussed rental pump, where to return it, taking pump supplies home and getting a kit to keep at the bedside in the NICU  When to switch to maintain mode on Symphony    Plan:  Will continue to follow and will plan to see Vanessa in a few days when she's visiting in the NICU  Vanessa is aware she can request lactation assistance whenever she is here at the hospital    Wendy Posada RN, IBCLC   Lactation Consultant  [place]: [facility] [phone]  Office: [phone]

## 2024-11-18 NOTE — CONSULTS
"Consult received for Vascular access care.  See LDA for details. For additional needs place \"Nursing to Consult for Vascular Access\" [id number] order in EPIC.  "

## 2024-11-19 ENCOUNTER — PRE VISIT (OUTPATIENT)
Dept: GASTROENTEROLOGY | Facility: CLINIC | Age: 29
End: 2024-11-19
Payer: COMMERCIAL

## 2024-11-19 VITALS
BODY MASS INDEX: 35.68 KG/M2 | OXYGEN SATURATION: 91 % | DIASTOLIC BLOOD PRESSURE: 93 MMHG | RESPIRATION RATE: 16 BRPM | WEIGHT: 182.7 LBS | TEMPERATURE: 98.1 F | HEART RATE: 57 BPM | SYSTOLIC BLOOD PRESSURE: 141 MMHG

## 2024-11-19 PROBLEM — O14.10 PREECLAMPSIA, SEVERE: Status: ACTIVE | Noted: 2024-11-19

## 2024-11-19 PROBLEM — O14.90 PREECLAMPSIA: Status: ACTIVE | Noted: 2024-11-19

## 2024-11-19 PROCEDURE — 250N000013 HC RX MED GY IP 250 OP 250 PS 637

## 2024-11-19 PROCEDURE — 99238 HOSP IP/OBS DSCHRG MGMT 30/<: CPT | Mod: GC | Performed by: STUDENT IN AN ORGANIZED HEALTH CARE EDUCATION/TRAINING PROGRAM

## 2024-11-19 PROCEDURE — 250N000013 HC RX MED GY IP 250 OP 250 PS 637: Performed by: OBSTETRICS & GYNECOLOGY

## 2024-11-19 RX ORDER — IBUPROFEN 600 MG/1
600 TABLET, FILM COATED ORAL EVERY 6 HOURS PRN
Qty: 60 TABLET | Refills: 0 | Status: SHIPPED | OUTPATIENT
Start: 2024-11-19 | End: 2024-12-19

## 2024-11-19 RX ORDER — ACETAMINOPHEN 325 MG/1
650 TABLET ORAL EVERY 6 HOURS PRN
Qty: 100 TABLET | Refills: 0 | Status: SHIPPED | OUTPATIENT
Start: 2024-11-19 | End: 2024-12-19

## 2024-11-19 RX ORDER — AMOXICILLIN 250 MG
1 CAPSULE ORAL DAILY
Qty: 100 TABLET | Refills: 0 | Status: ON HOLD | OUTPATIENT
Start: 2024-11-19 | End: 2024-11-22

## 2024-11-19 RX ORDER — HYDROCHLOROTHIAZIDE 25 MG/1
25 TABLET ORAL DAILY
Qty: 45 TABLET | Refills: 0 | Status: ON HOLD | OUTPATIENT
Start: 2024-11-20 | End: 2024-11-22

## 2024-11-19 RX ORDER — LABETALOL 200 MG/1
200 TABLET, FILM COATED ORAL ONCE
Status: COMPLETED | OUTPATIENT
Start: 2024-11-19 | End: 2024-11-19

## 2024-11-19 RX ORDER — LABETALOL 200 MG/1
800 TABLET, FILM COATED ORAL EVERY 8 HOURS
Qty: 300 TABLET | Refills: 0 | Status: ON HOLD | OUTPATIENT
Start: 2024-11-19 | End: 2024-11-22

## 2024-11-19 RX ORDER — OXYCODONE HYDROCHLORIDE 5 MG/1
5 TABLET ORAL EVERY 6 HOURS PRN
Qty: 9 TABLET | Refills: 0 | Status: ON HOLD | OUTPATIENT
Start: 2024-11-19 | End: 2024-11-22

## 2024-11-19 RX ADMIN — Medication 4 EACH: at 10:03

## 2024-11-19 RX ADMIN — ACETAMINOPHEN 975 MG: 325 TABLET, FILM COATED ORAL at 06:31

## 2024-11-19 RX ADMIN — IBUPROFEN 800 MG: 800 TABLET ORAL at 06:31

## 2024-11-19 RX ADMIN — OXYCODONE HYDROCHLORIDE 5 MG: 5 TABLET ORAL at 02:31

## 2024-11-19 RX ADMIN — IBUPROFEN 800 MG: 800 TABLET ORAL at 12:31

## 2024-11-19 RX ADMIN — ACETAMINOPHEN 975 MG: 325 TABLET, FILM COATED ORAL at 12:31

## 2024-11-19 RX ADMIN — ACETAMINOPHEN 975 MG: 325 TABLET, FILM COATED ORAL at 00:47

## 2024-11-19 RX ADMIN — LABETALOL HYDROCHLORIDE 600 MG: 300 TABLET, FILM COATED ORAL at 10:01

## 2024-11-19 RX ADMIN — HYDROCHLOROTHIAZIDE 25 MG: 25 TABLET ORAL at 08:30

## 2024-11-19 RX ADMIN — LABETALOL HYDROCHLORIDE 600 MG: 300 TABLET, FILM COATED ORAL at 02:31

## 2024-11-19 RX ADMIN — Medication 200 MG: at 00:47

## 2024-11-19 RX ADMIN — NIFEDIPINE 60 MG: 30 TABLET, FILM COATED, EXTENDED RELEASE ORAL at 08:30

## 2024-11-19 RX ADMIN — IBUPROFEN 800 MG: 800 TABLET ORAL at 00:48

## 2024-11-19 RX ADMIN — LABETALOL HYDROCHLORIDE 200 MG: 200 TABLET, FILM COATED ORAL at 12:31

## 2024-11-19 ASSESSMENT — ACTIVITIES OF DAILY LIVING (ADL)
ADLS_ACUITY_SCORE: 0
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
## 2024-11-19 NOTE — PROGRESS NOTES
[facility]  Postpartum Progress Note    S: Vanessa is feeling well this morning. Her pain has been adequately controlled on oral pain medications. She is tolerating PO intake without nausea or vomiting. She is voiding spontaneously and has passed flatus. No headaches, vision changes, chest pain, shortness of breath, or RUQ pain. Swelling continues to improve. Pumping for baby Teresita in the NICU. Desires natural family planning, declines contraception.    O:  Patient Vitals for the past 4 hrs:   BP Temp Temp src Pulse Resp Weight   24 0631 (!) 146/91 98.5  F (36.9  C) Oral 65 16 82.9 kg (182 lb 11.2 oz)     BP (!) 146/91 (BP Location: Right arm, Patient Position: Semi-Gustafson's, Cuff Size: Adult Regular)   Pulse 65   Temp 98.5  F (36.9  C) (Oral)   Resp 16   Wt 82.9 kg (182 lb 11.2 oz)   LMP 04/10/2024   SpO2 91%   Breastfeeding Unknown   BMI 35.68 kg/m      Gen: NAD  CV:  RRR, no murmurs  Pulm:  CTAB, no wheezes or crackles  Abd:  Soft, non-tender  Incision: Covered by steri strips which are clean, dry, intact. No surrounding erythema, no drainage  Ext:  2+ pitting edema in feet, 1+ non-pitting up to level of knee    I/O:  I/O last 3 completed shifts:  In: 960 [P.O.:960]  Out: 3300 [Urine:3300]    A/P:  Vanessa Turcios is a 29 year old  on PPD#4 s/p PLTCS, with pregnancy complicated by preeclampsia with severe features. SSR BP morning of PPD#3 required IV hydralazine treatment, since subsequent up-titration of labetalol has been mostly normotensive. Meeting early postoperative goals.     # Postpartum cares  - Pain: Continue scheduled acetaminophen, ibuprofen, and prn oxycodone  - Heme: Appropriate blood loss during surgery. No s/s of ongoing blood loss.     - GI: Bowel regimen. PRN simethicone QID. PRN antiemetics.  - : S/p wilson, voiding spontaneously  - PNC: Rh positive, Rubella immune. No interventions indicated.   - Breast pumping; no issues  - Contraception: Desires  to use natural family planning, ovulation test strips etc to avoid pregnancy. Discussed recommended pregnancy spacing of 18 months.   - PPx: Encourage ambulation, IS, SCDs while confined to bed. Lovenox while inpatient.    # Preeclampsia with severe features (BP, HA)  # Acute kidney injury d/t preE, resolved  - BP: Two low mild range BP values overnight  by 12 hours, last SSR >24 hours ago   - Meds: S/p 2D lab 400 TID [discontinued d/t hyperK], have been uptitrating nifedipine, followed by HCTZ, followed by labetalol. Now on D#4 nifed 60/60, D#3 HCTZ, D#2 lab 600 TID.  - IV lab 20/40/80 (11/11, 11/15), hydral 10 (11/11, 11/14, 11/15 x5, 11/18 0908)  - S/p 24hrs Mg (0430 11/16)   - O2 requirements: satting well on room air at time of visit, no oxygen requirement overnight  - Weight: 174 lbs 11/16 --> 185lbs 11/17 --> 182 lbs today; down-trending  - Symptoms: Denies  - AST 49>57>> 34, ALT 47>54>> 31, Cr 0.98>>0.77, o/w HELLP nl (11/17). Labs all normalized PPD#2  - HypoNa (resolved), 127>131>133>138; HyperK (resolved), 5.9>>>4.7  - CXR 11/16: no focal opacities, no edema, lung fields clear     Medically Ready for Discharge: Pending blood pressure control    Regina Spann MD  OB/GYN PGY-3  11/19/2024 7:32 AM    OBGYN Attending Attestation    Vanessa Who Tam was seen and examined by me separately from the team.  I have reviewed and agree with the above note by Dr. Spann.    Meeting postoperative goals for discharge. BP improving, now largely normotensive with occasional mild range blood pressures. If blood pressures remain low mild range to normotensive, okay for discharge later today. Will plan for close follow-up with [place] program.    Sally Key MD  [facility], Ob/Gyn  11/19/2024 9:10 AM

## 2024-11-19 NOTE — PLAN OF CARE
Goal Outcome Evaluation:      Plan of Care Reviewed With: patient    Overall Patient Progress: improving    Vital signs WDL. Postpartum checks: WDL. Incision: WDL, no apparent signs of infection. Pt eating and drinking without issue. Pt ambulating and voiding independently. Pt performing self-cares. Pt medicated for pain during the shift. Infant in NICU, pt is pumping.

## 2024-11-19 NOTE — CONSULTS
Acupuncture Clinical Internship Intake and Treatment Documentation   [facility]    Date:  2024  Patient Name:  Vanessa Turcios   YOB: 1995     Repeat Patient:  Yes  Has patient had acupoint/acupressure treatment before:  Yes    Signed consent placed in the medical record:  Yes  Patient/Family verbalizes understanding of risks and benefits:  Yes  Required information provided to patient:  Yes    Diagnosis:  Maternity*kayode:  25/Pre E with SF  Encounter for triage in pregnant patient  Encounter for supervision of low-risk first pregnancy in third trimester  Preeclampsia, severe    Patient condition and treatment:  Vanessa Turcios is a 29 year old  on PPD#4 s/p PLTCS, with pregnancy complicated by preeclampsia with severe features.   Reason for Intervention Today/Chief Complaint:  Sweating and digestion    Isolation:  No  Type:  None    PRE-SCORE:  moderate    Other Western medical information:  Pumping for baby Teresita in the NICU     Medications   Current Facility-Administered Medications:     acetaminophen (TYLENOL) tablet 975 mg, 975 mg, Oral, Q6H, Regina Spann MD, 975 mg at 24 0631    bisacodyl (DULCOLAX) suppository 10 mg, 10 mg, Rectal, Daily PRN, Regina Spann MD    calcium carbonate (TUMS) chewable tablet 1,000 mg, 1,000 mg, Oral, 4x Daily PRN, Regina Spann MD    calcium gluconate 10 % injection 1 g, 1 g, Intravenous, Once PRN, Nicole Wilson MD    calcium gluconate 10 % injection 1 g, 1 g, Intravenous, Once PRN, Regina Spann MD    calcium gluconate 10 % injection 1 g, 1 g, Intravenous, Once PRN, Regina Spann MD    carboprost (HEMABATE) injection 250 mcg, 250 mcg, Intramuscular, Q15 Min PRN **AND** loperamide (IMODIUM) capsule 4 mg, 4 mg, Oral, Once PRN, Regina Spann MD    enoxaparin ANTICOAGULANT (LOVENOX) injection 40 mg, 40 mg, Subcutaneous, Q24H, Regina Spann MD, 40 mg at 24 0906    hydrALAZINE  (APRESOLINE) injection 10 mg, 10 mg, Intravenous, Q20 Min PRN, Nicole Wilson MD, 10 mg at 11/18/24 0908    hydrALAZINE (APRESOLINE) injection 10 mg, 10 mg, Intravenous, Q20 Min PRN, Regina Spann MD, 10 mg at 11/15/24 2134    hydrochlorothiazide (HYDRODIURIL) tablet 25 mg, 25 mg, Oral, Daily, Irish, Ana Oh MD, 25 mg at 11/19/24 0830    hydrocortisone (Perianal) (ANUSOL-HC) 2.5 % cream, , Rectal, TID PRN, Regina Spann MD    hydrOXYzine HCl (ATARAX) tablet 50 mg, 50 mg, Oral, Q6H PRN, Regina Spann MD, 50 mg at 11/17/24 0033    ibuprofen (ADVIL/MOTRIN) tablet 800 mg, 800 mg, Oral, Q6H, Regian Spann MD, 800 mg at 11/19/24 0631    labetalol (NORMODYNE) tablet 200 mg, 200 mg, Oral, Once, eTreza Lombardi MD    labetalol (NORMODYNE) tablet 800 mg, 800 mg, Oral, Q8H, Tereza Lombardi MD    lanolin cream, , Topical, Q1H PRN, Regina Spann MD    lidocaine (LMX4) cream, , Topical, Q1H PRN, Regina Spann MD    lidocaine (LMX4) cream, , Topical, Q1H PRN, Regina Spann MD    lidocaine 1 % 0.1-1 mL, 0.1-1 mL, Other, Q1H PRN, Regina Spann MD    lidocaine 1 % 0.1-1 mL, 0.1-1 mL, Other, Q1H PRN, Regina Spann MD    loperamide (IMODIUM) capsule 2 mg, 2 mg, Oral, Q2H PRN, Regina Spann MD    magnesium glycinate capsule 200 mg, 200 mg, Oral, At Bedtime, Regina Spann MD, 200 mg at 11/19/24 0047    magnesium sulfate 2 g in 50 mL sterile water intermittent infusion, 2 g, Intravenous, Once PRN, Nicole Wilson MD    magnesium sulfate 2 g in 50 mL sterile water intermittent infusion, 2 g, Intravenous, Once PRN, Regina Spann MD    magnesium sulfate 4 g in 100 mL sterile water intermittent infusion, 4 g, Intravenous, Once PRN, Nicole Wilson MD    magnesium sulfate 4 g in 100 mL sterile water intermittent infusion, 4 g, Intravenous, Once PRN, Regina Spann MD    methylergonovine (METHERGINE) injection 200 mcg, 200 mcg, Intramuscular, Q2H PRN, Regina Spann MD     metoclopramide (REGLAN) tablet 10 mg, 10 mg, Oral, Q6H PRN **OR** metoclopramide (REGLAN) injection 10 mg, 10 mg, Intravenous, Q6H PRN, Regina Spann MD, 10 mg at 11/15/24 1102    metoclopramide (REGLAN) injection 10 mg, 10 mg, Intravenous, Q6H PRN, Nicole Wilson MD    midazolam (VERSED) injection 2 mg, 2 mg, Intravenous, Q5 Min PRN, Nicole Wilson MD    midazolam (VERSED) injection 2 mg, 2 mg, Intravenous, Q5 Min PRN, Regina Spann MD    midazolam (VERSED) injection 2 mg, 2 mg, Intravenous, Q5 Min PRN, Regina Spann MD    misoprostol (CYTOTEC) tablet 400 mcg, 400 mcg, Oral, ONCE PRN REPEAT PER INSTRUCTIONS **OR** misoprostol (CYTOTEC) tablet 800 mcg, 800 mcg, Rectal, ONCE PRN REPEAT PER INSTRUCTIONS, Regina Spann MD    naloxone (NARCAN) injection 0.2 mg, 0.2 mg, Intravenous, Q2 Min PRN **OR** naloxone (NARCAN) injection 0.4 mg, 0.4 mg, Intravenous, Q2 Min PRN **OR** naloxone (NARCAN) injection 0.2 mg, 0.2 mg, Intramuscular, Q2 Min PRN **OR** naloxone (NARCAN) injection 0.4 mg, 0.4 mg, Intramuscular, Q2 Min PRN, Regina Spann MD    NIFEdipine (PROCARDIA) capsule 10-20 mg, 10-20 mg, Oral, Q20 Min PRN, Delilah Mayo MD    NIFEdipine ER OSMOTIC (PROCARDIA XL) 24 hr tablet 60 mg, 60 mg, Oral, BID, Delilah Mayo MD, 60 mg at 11/19/24 0830    No MMR Needed -  Assessment: Patient does not need MMR vaccine, , Does not apply, Continuous PRN, Regina Spann MD    No Tdap Needed - Assessment: Patient does not need Tdap vaccine, , Does not apply, Continuous PRN, Regina Spann MD    ondansetron (ZOFRAN ODT) ODT tab 4 mg, 4 mg, Oral, Q6H PRN **OR** ondansetron (ZOFRAN) injection 4 mg, 4 mg, Intravenous, Q6H PRN, Regina Spann MD, 4 mg at 11/15/24 0923    Organs Blend Supplement 6 capsules [HOME med], 6 capsule, Oral, Daily, Regina Spann MD, 6 capsule at 11/19/24 1003    oxyCODONE (ROXICODONE) tablet 5 mg, 5 mg, Oral, Q4H PRN, Regina Spann MD, 5 mg at 11/19/24 4059     oxytocin (PITOCIN) 30 units in 500 mL 0.9% NaCl infusion, 340 mL/hr, Intravenous, Continuous PRN, Regina Spann MD    oxytocin (PITOCIN) injection 10 Units, 10 Units, Intramuscular, Once PRN, Regina Spann MD    Prenatal Gummies 0.18-25 MG CHEW 4 each [HOME supply], 4 each, Oral, Daily, Regina Spann MD, 4 each at 11/19/24 1003    prochlorperazine (COMPAZINE) tablet 10 mg, 10 mg, Oral, Q6H PRN **OR** prochlorperazine (COMPAZINE) injection 10 mg, 10 mg, Intravenous, Q6H PRN, Regina Spann MD    senna-docusate (SENOKOT-S/PERICOLACE) 8.6-50 MG per tablet 1 tablet, 1 tablet, Oral, BID, 1 tablet at 11/16/24 2031 **OR** senna-docusate (SENOKOT-S/PERICOLACE) 8.6-50 MG per tablet 2 tablet, 2 tablet, Oral, BID, Regina Spann MD, 2 tablet at 11/16/24 1153    simethicone (MYLICON) chewable tablet 80 mg, 80 mg, Oral, 4x Daily PRN, Regina Spann MD, 80 mg at 11/18/24 1450    sodium chloride (PF) 0.9% PF flush 3 mL, 3 mL, Intracatheter, q1 min prn, Regina Spann MD    sodium chloride (PF) 0.9% PF flush 3 mL, 3 mL, Intracatheter, q1 min prn, Nicole Wilson MD    sodium chloride (PF) 0.9% PF flush 3 mL, 3 mL, Intracatheter, Q8H, Regina Spann MD, 3 mL at 11/19/24 0048    sodium chloride (PF) 0.9% PF flush 3 mL, 3 mL, Intracatheter, q1 min prn, Regina Spann MD    sodium phosphate (FLEET ENEMA) 1 enema, 1 enema, Rectal, Daily PRN, Regina Spann MD    tranexamic acid 1 g in 100 mL NS IV bag (premix), 1 g, Intravenous, Q30 Min PRN, Regina Spann MD   Current Outpatient Medications   Medication Sig Dispense Refill    acetaminophen (TYLENOL) 325 MG tablet Take 2 tablets (650 mg) by mouth every 6 hours as needed for mild pain. Start after Delivery. 100 tablet 0    [START ON 11/20/2024] hydrochlorothiazide (HYDRODIURIL) 25 MG tablet Take 1 tablet (25 mg) by mouth daily. 45 tablet 0    ibuprofen (ADVIL/MOTRIN) 600 MG tablet Take 1 tablet (600 mg) by mouth every 6 hours as needed for moderate  pain. Start after delivery 60 tablet 0    labetalol (NORMODYNE) 200 MG tablet Take 4 tablets (800 mg) by mouth every 8 hours. 300 tablet 0    NIFEdipine ER OSMOTIC (ADALAT CC) 60 MG 24 hr tablet Take 1 tablet (60 mg) by mouth 2 times daily. 90 tablet 0    oxyCODONE (ROXICODONE) 5 MG tablet Take 1 tablet (5 mg) by mouth every 6 hours as needed for moderate to severe pain. 9 tablet 0    senna-docusate (SENOKOT-S/PERICOLACE) 8.6-50 MG tablet Take 1 tablet by mouth daily. Start after delivery. 100 tablet 0       Pre-Treatment Assessment  Chief Complaint/ Reason for Intervention Today:  Night sweat and digestion  Chief Complaint Pre-Score:  moderate   Describe:  Night sweat started couple days ago for several time, worst last night.Rate 6-7/10 Upper abdominal feel pressure, sharp pain after surgery but better now. Pt confirmed feeling as and bloating sometime without eating. Rate 5/10  Pain Location:  Abdominal discomfort  ( upper right), cramping).  Pre Session Pain:  Moderate  Pre Session Anxiety:  None  Pre Session Nausea:  None    10 Traditional Chinese Medicine Assessment Questions  - Cold/ Heat:  Alternative between hot and cold. Pt reported some day feeling hot like putting heat lamp on all of her body, and there are the day she feel chill and cold.  - Sweat:  See more in chief complaint  - Headaches/Body aches:  No problem with headaches today. Beside abdominal, pt reported little sore on back ( mid back is more compare to other location).  - Chest/Abdomen:  No problem or concerned with chest today. Abdomen: See more in MC.  - Digestion:  Appetite increase, no nausea or vomiting, gas after or sometime with little food intake.  - Bowel Movement/Urination:  Out little bit greater than in at the current time. Loose stool and brown color  - Hearing/Vision:  Pt had no changing or problem with vision and hearing today.  - Sleep (prior to hospital):  Pt said she sleep well last 2 night, no problem falling asleep, active  vivid dream and mild fatigue today.  - Energy:  Pt have more energy in the morning.  - Emotions:  Pt feel great today.  - Ob Gyn:   on PPD#4 s/p PLTCS   - Miscellaneous:  Pt lay down on bed during intake and fall asleep after insert needles. She asking about the diagnosis in TCM for her problem today after the supervisor went in the room and checked the needles    Traditional Chinese Medicine Assessment  - TONGUE:  pale, moist, tooth judy and white coat  - PULSE:  R is stronger than left, little slippery, deep at kidney, stronger at the 3rd level.  - OBSERVATIONS:  Pt have some swelling on the feet, slightly warm skin     Traditional Chinese Medicine Diagnosis  - BRANCH:  Qi stagnation  - ROOT:  Blood and Yin deficiency     Traditional Chinese Medicine Treatment  - ACUPUNCTURE:  Sp 6, Olive 8, St 36, Ht 7, Olive 3, LI 4, TW 5, Li 11, Kid 6     Magnet informed consent signed and given:  Yes    Post Treatment Assessment  Chief complaint post score:  better  Post Session Observation:  Pt is relaxed  Patient/Family Education:  None  Verbal information provided:  No  Written information provided:  No  All questions answered at time of treatment:  Yes    Treatment/Procedure(s) performed by:  Ines Mueller    Date: 2024     I attest that this acupuncture treatment was done under my supervision and this note is complete and true.     Supervising acupuncturist:    Ricky Zheng LAc [facility]    Associate Clinic Faculty    [facility]  [place] Acupuncture license #: [id number]  P: [phone]

## 2024-11-19 NOTE — PLAN OF CARE
Goal Outcome Evaluation:      Plan of Care Reviewed With: patient          Problem: Postpartum ( Delivery)  Goal: Optimal Pain Control and Function  Outcome: Progressing  Intervention: Prevent or Manage Pain  Recent Flowsheet Documentation  Taken 2024 0957 by Shelly Cates RN  Perineal Care: perineum cleansed     -140's/90's. Extra dose of labetalol ordered and given. Afebrile. Pumping for baby in NICU. Tolerating regular diet. Up ad reinaldo and visiting baby in NICU. Denies headache, blurry vision, SOB and epigastric pain. Voiding and has had several BMs. Strict I&Os. FOB here and supportive. Anticipate discharge home this evening if BPs remain WNL.

## 2024-11-19 NOTE — PLAN OF CARE
Postpartum stable for discharge. Discharge instructions gone over with the patient, verbalized understanding. Discharge medications gone over with the patient, pt verbalized understanding of administration and timing. Pt has a Blood pressure cuff at home, given the bp card and pt verbalized understanding of when to contact the provider for elevated pressures. Discharge home on [place] BP program.

## 2024-11-19 NOTE — PLAN OF CARE
Goal Outcome Evaluation:      Plan of Care Reviewed With: patient, parent    Overall Patient Progress: improvingOverall Patient Progress: improving    Data: Vital signs within normal limits. Postpartum checks within normal limits - see flow record. Patient eating and drinking normally. Patient able to empty bladder independently and is up ambulating. No apparent signs of infection. Incision healing well. Patient performing self cares and is able to cpump for infant.  Action: Patient medicated during the shift for pain and cramping. See MAR. Patient reassessed within 1 hour after each medication and pain was improved - patient stated she was comfortable. Patient education done about self care. See flow record.  Response: Positive attachment behaviors observed with infant on video and in pictures. Support persons was present.   Plan: Anticipate discharge .

## 2024-11-19 NOTE — DISCHARGE INSTRUCTIONS
Warning Signs after Having a Baby    Keep this paper on your fridge or somewhere else where you can see it.    Call your provider if you have any of these symptoms up to 12 weeks after having your baby.    Thoughts of hurting yourself or your baby  Pain in your chest or trouble breathing  Severe headache not helped by pain medicine  Eyesight concerns (blurry vision, seeing spots or flashes of light, other changes to eyesight)  Fainting, shaking or other signs of a seizure    Call 9-1-1 if you feel that it is an emergency.     The symptoms below can happen to anyone after giving birth. They can be very serious. Call your provider if you have any of these warning signs.    My provider s phone number: _______________________    Losing too much blood (hemorrhage)    Call your provider if you soak through a pad in less than an hour or pass blood clots bigger than a golf ball. These may be signs that you are bleeding too much.    Blood clots in the legs or lungs    After you give birth, your body naturally clots its blood to help prevent blood loss. Sometimes this increased clotting can happen in other areas of the body, like the legs or lungs. This can block your blood flow and be very dangerous.     Call your provider if you:  Have a red, swollen spot on the back of your leg that is warm or painful when you touch it.   Are coughing up blood.     Infection    Call your provider if you have any of these symptoms:  Fever of 100.4 F (38 C) or higher.  Pain or redness around your stitches if you had an incision.   Any yellow, white, or green fluid coming from places where you had stitches or surgery.    Mood Problems (postpartum depression)    Many people feel sad or have mood changes after having a baby. But for some people, these mood swings are worse.     Call your provider right away if you feel so anxious or nervous that you can't care for yourself or your baby.    Preeclampsia (high blood pressure)    Even if you  didn't have high blood pressure when you were pregnant, you are at risk for the high blood pressure disease called preeclampsia. This risk can last up to 12 weeks after giving birth.     Call your provider if you have:   Pain on your right side under your rib cage  Sudden swelling in the hands and face    Remember: You know your body. If something doesn't feel right, get medical help.     For informational purposes only. Not to replace the advice of your health care provider. Copyright 2020 [employer] [facility]. All rights reserved. Clinically reviewed by Amarilis Mcgovern, RNC-OB, MSN. [employer] [id number] - Rev 02/23.    Checking Your Blood Pressure at Home  During and after pregnancy  How do I measure my blood pressure?  It s important to take the readings at the same time each day, such as morning and evening. Take your blood pressure before taking any morning medications.  How to get the most accurate reading  30 minutes before checking your blood pressure, avoid the following:  Drinking caffeine  Drinking alcohol  Eating  Smoking  Exercising  5 minutes before checking your blood pressure:  Use the bathroom and urinate so you have an empty bladder.  Sit still in a chair for around 5 minutes. Stay calm and relaxed and do not talk if possible.  To check your blood pressure:     Sit up straight in a chair.  Place your feet on the floor. Don t cross your ankles or legs.  Rest your arm at the level of your heart on a table or desk or on the arm of a chair. Use the same arm every day.  Pull up your shirt sleeve. Don t take the measurement over clothes.  Wrap the blood pressure cuff around the upper part of your left arm, 1 inch (2.5 cm) above your elbow.  Fit the cuff snugly around your arm. You should be able to place only one finger between the cuff and your arm.  Position the cord so that it rests in the bend of your elbow.  Press the power button.  Sit quietly while the cuff inflates and deflates.  Read the  digital reading on the monitor screen and write the numbers down (record them) in a notebook.  Wait 2-3 minutes, then repeat the steps, starting at step 1.  Which features do you need?  Arm cuff monitors give the most exact readings.  Wrist and finger blood pressure monitors are often less exact.  Pick a blood pressure monitor that has passed tests to show they measure exactly. Blood pressure cuffs for sale in the [place] that have passed tests are listed on the website www.validatebp.org.  Some monitors that have passed tests are:  Omron 3 Series Upper Arm Blood Pressure Monitor (Model DO9982)  Omron 5 Series Upper Arm Blood Pressure Monitor (Model UW1143)  Omron 7 Series Upper Arm Blood Pressure Monitor (Model HEM-7320)  A[employer]D Medical Upper Arm Blood Pressure Monitor with Talking Function (UA 1030T)  Don t use smartphone apps. There are many smartphone apps that claim to check your blood pressure using the pulse in your wrist or finger. These don t work. They haven t passed any tests. Don t give your clinic a blood pressure reading from a smartphone raimundo.  If you have a flexible spending account (FSA) or health savings account (HSA), you may wish to pay yourself back (reimburse) for the machine and cuff. A blood pressure monitor is an allowed over-the- counter (OTC) item to pay yourself back from these accounts.  Cuff size  The size of the arm cuff is a key feature. Make sure the cuff is the right size for your arm. If the cuff isn t the right size, readings will either be too high or low.  To know what size cuff to buy, measure the distance around your bicep (upper arm).  Use a flexible measuring tape or . Place the measuring tape prison between your armpit and elbow. Measure the distance around your arm in inches.  You may need to buy a cuff apart from the machine to get the right size.  Cuff sizes and arm measurements  Small adult: 22 to 26 cm (8.7 to 10.2 inches)  Adult: 27 to 34 cm (10.6 to 13.4  "inches)  Large adult: 35 to 44 cm (13.8 to 17.3 inches)  Adult thigh: 45 to 52 cm (17.7 to 20.5 inches)    Copyright statement\" content=\"For informational purposes only. Not to replace the advice of your health care provider. Photo: ID [id number]   [employer]. Text copyright   2023 [facility]. All rights reserved. Clinically reviewed by [facility]. [employer] [id number] - REV 03/23.  Checking Your Blood Pressure at Home  During and after pregnancy  How do I measure my blood pressure?  It s important to take the readings at the same time each day, such as morning and evening. Take your blood pressure before taking any morning medications.  How to get the most accurate reading  30 minutes before checking your blood pressure, avoid the following:  Drinking caffeine  Drinking alcohol  Eating  Smoking  Exercising  5 minutes before checking your blood pressure:  Use the bathroom and urinate so you have an empty bladder.  Sit still in a chair for around 5 minutes. Stay calm and relaxed and do not talk if possible.  To check your blood pressure:     Sit up straight in a chair.  Place your feet on the floor. Don t cross your ankles or legs.  Rest your arm at the level of your heart on a table or desk or on the arm of a chair. Use the same arm every day.  Pull up your shirt sleeve. Don t take the measurement over clothes.  Wrap the blood pressure cuff around the upper part of your left arm, 1 inch (2.5 cm) above your elbow.  Fit the cuff snugly around your arm. You should be able to place only one finger between the cuff and your arm.  Position the cord so that it rests in the bend of your elbow.  Press the power button.  Sit quietly while the cuff inflates and deflates.  Read the digital reading on the monitor screen and write the numbers down (record them) in a notebook.  Wait 2-3 minutes, then repeat the steps, starting at step 1.  Which features do you need?  Arm cuff " "monitors give the most exact readings.  Wrist and finger blood pressure monitors are often less exact.  Pick a blood pressure monitor that has passed tests to show they measure exactly. Blood pressure cuffs for sale in the [place] that have passed tests are listed on the website www.validatebp.org.  Some monitors that have passed tests are:  Omron 3 Series Upper Arm Blood Pressure Monitor (Model CA2158)  Omron 5 Series Upper Arm Blood Pressure Monitor (Model XA7446)  Omron 7 Series Upper Arm Blood Pressure Monitor (Model HEM-7320)  A[employer] Medical Upper Arm Blood Pressure Monitor with Talking Function (UA 1030T)  Don t use smartphone apps. There are many smartphone apps that claim to check your blood pressure using the pulse in your wrist or finger. These don t work. They haven t passed any tests. Don t give your clinic a blood pressure reading from a smartphone raimundo.  If you have a flexible spending account (FSA) or health savings account (HSA), you may wish to pay yourself back (reimburse) for the machine and cuff. A blood pressure monitor is an allowed over-the- counter (OTC) item to pay yourself back from these accounts.  Cuff size  The size of the arm cuff is a key feature. Make sure the cuff is the right size for your arm. If the cuff isn t the right size, readings will either be too high or low.  To know what size cuff to buy, measure the distance around your bicep (upper arm).  Use a flexible measuring tape or . Place the measuring tape USP between your armpit and elbow. Measure the distance around your arm in inches.  You may need to buy a cuff apart from the machine to get the right size.  Cuff sizes and arm measurements  Small adult: 22 to 26 cm (8.7 to 10.2 inches)  Adult: 27 to 34 cm (10.6 to 13.4 inches)  Large adult: 35 to 44 cm (13.8 to 17.3 inches)  Adult thigh: 45 to 52 cm (17.7 to 20.5 inches)    Copyright statement\" content=\"For informational purposes only. Not to replace the advice of " your health care provider. Photo: ID [id number]   [employer]  [employer]. Text copyright   2023 [facility]. All rights reserved. Clinically reviewed by [facility]. [employer] [id number] - REV 03/23.

## 2024-11-20 ENCOUNTER — HOSPITAL ENCOUNTER (INPATIENT)
Facility: CLINIC | Age: 29
DRG: 776 | End: 2024-11-20
Attending: STUDENT IN AN ORGANIZED HEALTH CARE EDUCATION/TRAINING PROGRAM | Admitting: OBSTETRICS & GYNECOLOGY
Payer: COMMERCIAL

## 2024-11-20 ENCOUNTER — TELEPHONE (OUTPATIENT)
Dept: OBGYN | Facility: CLINIC | Age: 29
End: 2024-11-20
Payer: COMMERCIAL

## 2024-11-20 DIAGNOSIS — Z34.03 ENCOUNTER FOR SUPERVISION OF LOW-RISK FIRST PREGNANCY IN THIRD TRIMESTER: Primary | ICD-10-CM

## 2024-11-20 DIAGNOSIS — O14.10 SEVERE PRE-ECLAMPSIA, ANTEPARTUM: ICD-10-CM

## 2024-11-20 DIAGNOSIS — O14.93 PRE-ECLAMPSIA IN THIRD TRIMESTER: ICD-10-CM

## 2024-11-20 LAB
ALBUMIN SERPL BCG-MCNC: 3.4 G/DL (ref 3.5–5.2)
ALP SERPL-CCNC: 114 U/L (ref 40–150)
ALT SERPL W P-5'-P-CCNC: 137 U/L (ref 0–50)
ANION GAP SERPL CALCULATED.3IONS-SCNC: 13 MMOL/L (ref 7–15)
AST SERPL W P-5'-P-CCNC: 108 U/L (ref 0–45)
BILIRUB SERPL-MCNC: <0.2 MG/DL
BUN SERPL-MCNC: 31 MG/DL (ref 6–20)
CALCIUM SERPL-MCNC: 8.9 MG/DL (ref 8.8–10.4)
CHLORIDE SERPL-SCNC: 104 MMOL/L (ref 98–107)
CREAT SERPL-MCNC: 0.68 MG/DL (ref 0.51–0.95)
EGFRCR SERPLBLD CKD-EPI 2021: >90 ML/MIN/1.73M2
ERYTHROCYTE [DISTWIDTH] IN BLOOD BY AUTOMATED COUNT: 11.9 % (ref 10–15)
GLUCOSE SERPL-MCNC: 93 MG/DL (ref 70–99)
HCO3 SERPL-SCNC: 20 MMOL/L (ref 22–29)
HCT VFR BLD AUTO: 32.5 % (ref 35–47)
HGB BLD-MCNC: 11.4 G/DL (ref 11.7–15.7)
MCH RBC QN AUTO: 30.7 PG (ref 26.5–33)
MCHC RBC AUTO-ENTMCNC: 35.1 G/DL (ref 31.5–36.5)
MCV RBC AUTO: 88 FL (ref 78–100)
PLATELET # BLD AUTO: 196 10E3/UL (ref 150–450)
POTASSIUM SERPL-SCNC: 4.5 MMOL/L (ref 3.4–5.3)
PROT SERPL-MCNC: 5.9 G/DL (ref 6.4–8.3)
RBC # BLD AUTO: 3.71 10E6/UL (ref 3.8–5.2)
SODIUM SERPL-SCNC: 137 MMOL/L (ref 135–145)
WBC # BLD AUTO: 9.6 10E3/UL (ref 4–11)

## 2024-11-20 PROCEDURE — 84155 ASSAY OF PROTEIN SERUM: CPT

## 2024-11-20 PROCEDURE — 999N000127 HC STATISTIC PERIPHERAL IV START W US GUIDANCE

## 2024-11-20 PROCEDURE — 80053 COMPREHEN METABOLIC PANEL: CPT

## 2024-11-20 PROCEDURE — 999N000040 HC STATISTIC CONSULT NO CHARGE VASC ACCESS

## 2024-11-20 PROCEDURE — 250N000013 HC RX MED GY IP 250 OP 250 PS 637

## 2024-11-20 PROCEDURE — 250N000013 HC RX MED GY IP 250 OP 250 PS 637: Performed by: OBSTETRICS & GYNECOLOGY

## 2024-11-20 PROCEDURE — 85027 COMPLETE CBC AUTOMATED: CPT

## 2024-11-20 PROCEDURE — 120N000002 HC R&B MED SURG/OB UMMC

## 2024-11-20 PROCEDURE — 84295 ASSAY OF SERUM SODIUM: CPT

## 2024-11-20 PROCEDURE — 85048 AUTOMATED LEUKOCYTE COUNT: CPT

## 2024-11-20 PROCEDURE — 36415 COLL VENOUS BLD VENIPUNCTURE: CPT

## 2024-11-20 RX ORDER — MODIFIED LANOLIN
OINTMENT (GRAM) TOPICAL
Status: DISCONTINUED | OUTPATIENT
Start: 2024-11-20 | End: 2024-11-22 | Stop reason: HOSPADM

## 2024-11-20 RX ORDER — SODIUM PHOSPHATE,MONO-DIBASIC 19G-7G/118
1 ENEMA (ML) RECTAL DAILY PRN
Status: DISCONTINUED | OUTPATIENT
Start: 2024-11-22 | End: 2024-11-22 | Stop reason: HOSPADM

## 2024-11-20 RX ORDER — BISACODYL 10 MG
10 SUPPOSITORY, RECTAL RECTAL DAILY PRN
Status: DISCONTINUED | OUTPATIENT
Start: 2024-11-22 | End: 2024-11-22 | Stop reason: HOSPADM

## 2024-11-20 RX ORDER — AMOXICILLIN 250 MG
2 CAPSULE ORAL 2 TIMES DAILY
Status: DISCONTINUED | OUTPATIENT
Start: 2024-11-20 | End: 2024-11-22 | Stop reason: HOSPADM

## 2024-11-20 RX ORDER — NIFEDIPINE 10 MG/1
10 CAPSULE ORAL ONCE
Status: DISCONTINUED | OUTPATIENT
Start: 2024-11-20 | End: 2024-11-22 | Stop reason: HOSPADM

## 2024-11-20 RX ORDER — NIFEDIPINE 30 MG/1
60 TABLET, EXTENDED RELEASE ORAL 2 TIMES DAILY
Status: DISCONTINUED | OUTPATIENT
Start: 2024-11-20 | End: 2024-11-22 | Stop reason: HOSPADM

## 2024-11-20 RX ORDER — OXYTOCIN 10 [USP'U]/ML
10 INJECTION, SOLUTION INTRAMUSCULAR; INTRAVENOUS
Status: DISCONTINUED | OUTPATIENT
Start: 2024-11-20 | End: 2024-11-22 | Stop reason: HOSPADM

## 2024-11-20 RX ORDER — AMOXICILLIN 250 MG
1 CAPSULE ORAL 2 TIMES DAILY
Status: DISCONTINUED | OUTPATIENT
Start: 2024-11-20 | End: 2024-11-22 | Stop reason: HOSPADM

## 2024-11-20 RX ORDER — HYDRALAZINE HYDROCHLORIDE 10 MG/1
10 TABLET, FILM COATED ORAL 4 TIMES DAILY
Status: DISCONTINUED | OUTPATIENT
Start: 2024-11-20 | End: 2024-11-20

## 2024-11-20 RX ORDER — SIMETHICONE 80 MG
80 TABLET,CHEWABLE ORAL 4 TIMES DAILY PRN
Status: DISCONTINUED | OUTPATIENT
Start: 2024-11-20 | End: 2024-11-22 | Stop reason: HOSPADM

## 2024-11-20 RX ORDER — LIDOCAINE 40 MG/G
CREAM TOPICAL
Status: DISCONTINUED | OUTPATIENT
Start: 2024-11-20 | End: 2024-11-22 | Stop reason: HOSPADM

## 2024-11-20 RX ORDER — MAGNESIUM GLYCINATE 100 MG
100 CAPSULE ORAL DAILY
Status: DISCONTINUED | OUTPATIENT
Start: 2024-11-20 | End: 2024-11-22 | Stop reason: HOSPADM

## 2024-11-20 RX ORDER — IBUPROFEN 600 MG/1
600 TABLET, FILM COATED ORAL EVERY 6 HOURS PRN
Status: DISCONTINUED | OUTPATIENT
Start: 2024-11-20 | End: 2024-11-20

## 2024-11-20 RX ORDER — NALOXONE HYDROCHLORIDE 0.4 MG/ML
0.4 INJECTION, SOLUTION INTRAMUSCULAR; INTRAVENOUS; SUBCUTANEOUS
Status: DISCONTINUED | OUTPATIENT
Start: 2024-11-20 | End: 2024-11-22 | Stop reason: HOSPADM

## 2024-11-20 RX ORDER — LOPERAMIDE HYDROCHLORIDE 2 MG/1
4 CAPSULE ORAL
Status: DISCONTINUED | OUTPATIENT
Start: 2024-11-20 | End: 2024-11-22 | Stop reason: HOSPADM

## 2024-11-20 RX ORDER — HYDROCHLOROTHIAZIDE 25 MG/1
25 TABLET ORAL DAILY
Status: DISCONTINUED | OUTPATIENT
Start: 2024-11-20 | End: 2024-11-22 | Stop reason: ALTCHOICE

## 2024-11-20 RX ORDER — CYCLOBENZAPRINE HCL 10 MG
10 TABLET ORAL EVERY 8 HOURS PRN
Status: DISCONTINUED | OUTPATIENT
Start: 2024-11-20 | End: 2024-11-22 | Stop reason: HOSPADM

## 2024-11-20 RX ORDER — MISOPROSTOL 200 UG/1
400 TABLET ORAL
Status: DISCONTINUED | OUTPATIENT
Start: 2024-11-20 | End: 2024-11-22 | Stop reason: HOSPADM

## 2024-11-20 RX ORDER — OXYTOCIN/0.9 % SODIUM CHLORIDE 30/500 ML
340 PLASTIC BAG, INJECTION (ML) INTRAVENOUS CONTINUOUS PRN
Status: DISCONTINUED | OUTPATIENT
Start: 2024-11-20 | End: 2024-11-22 | Stop reason: HOSPADM

## 2024-11-20 RX ORDER — MISOPROSTOL 200 UG/1
800 TABLET ORAL
Status: DISCONTINUED | OUTPATIENT
Start: 2024-11-20 | End: 2024-11-22 | Stop reason: HOSPADM

## 2024-11-20 RX ORDER — LOPERAMIDE HYDROCHLORIDE 2 MG/1
2 CAPSULE ORAL
Status: DISCONTINUED | OUTPATIENT
Start: 2024-11-20 | End: 2024-11-22 | Stop reason: HOSPADM

## 2024-11-20 RX ORDER — METHYLERGONOVINE MALEATE 0.2 MG/ML
200 INJECTION INTRAVENOUS
Status: DISCONTINUED | OUTPATIENT
Start: 2024-11-20 | End: 2024-11-22 | Stop reason: HOSPADM

## 2024-11-20 RX ORDER — PROCHLORPERAZINE MALEATE 10 MG
10 TABLET ORAL EVERY 6 HOURS PRN
Status: DISCONTINUED | OUTPATIENT
Start: 2024-11-20 | End: 2024-11-22 | Stop reason: HOSPADM

## 2024-11-20 RX ORDER — LIDOCAINE 4 G/G
2 PATCH TOPICAL
Status: DISCONTINUED | OUTPATIENT
Start: 2024-11-20 | End: 2024-11-22 | Stop reason: HOSPADM

## 2024-11-20 RX ORDER — NALOXONE HYDROCHLORIDE 0.4 MG/ML
0.2 INJECTION, SOLUTION INTRAMUSCULAR; INTRAVENOUS; SUBCUTANEOUS
Status: DISCONTINUED | OUTPATIENT
Start: 2024-11-20 | End: 2024-11-22 | Stop reason: HOSPADM

## 2024-11-20 RX ORDER — METOCLOPRAMIDE HYDROCHLORIDE 5 MG/ML
10 INJECTION INTRAMUSCULAR; INTRAVENOUS EVERY 6 HOURS PRN
Status: DISCONTINUED | OUTPATIENT
Start: 2024-11-20 | End: 2024-11-22 | Stop reason: HOSPADM

## 2024-11-20 RX ORDER — HYDRALAZINE HYDROCHLORIDE 20 MG/ML
10 INJECTION INTRAMUSCULAR; INTRAVENOUS
Status: DISCONTINUED | OUTPATIENT
Start: 2024-11-20 | End: 2024-11-22 | Stop reason: HOSPADM

## 2024-11-20 RX ORDER — TRANEXAMIC ACID 10 MG/ML
1 INJECTION, SOLUTION INTRAVENOUS EVERY 30 MIN PRN
Status: DISCONTINUED | OUTPATIENT
Start: 2024-11-20 | End: 2024-11-22 | Stop reason: HOSPADM

## 2024-11-20 RX ORDER — LABETALOL 200 MG/1
800 TABLET, FILM COATED ORAL EVERY 8 HOURS
Status: DISCONTINUED | OUTPATIENT
Start: 2024-11-20 | End: 2024-11-22

## 2024-11-20 RX ORDER — CARBOPROST TROMETHAMINE 250 UG/ML
250 INJECTION, SOLUTION INTRAMUSCULAR
Status: DISCONTINUED | OUTPATIENT
Start: 2024-11-20 | End: 2024-11-22 | Stop reason: HOSPADM

## 2024-11-20 RX ORDER — METOCLOPRAMIDE 10 MG/1
10 TABLET ORAL EVERY 6 HOURS PRN
Status: DISCONTINUED | OUTPATIENT
Start: 2024-11-20 | End: 2024-11-22 | Stop reason: HOSPADM

## 2024-11-20 RX ORDER — ONDANSETRON 4 MG/1
4 TABLET, ORALLY DISINTEGRATING ORAL EVERY 6 HOURS PRN
Status: DISCONTINUED | OUTPATIENT
Start: 2024-11-20 | End: 2024-11-22 | Stop reason: HOSPADM

## 2024-11-20 RX ORDER — LABETALOL HYDROCHLORIDE 5 MG/ML
20-40 INJECTION, SOLUTION INTRAVENOUS EVERY 10 MIN PRN
Status: DISCONTINUED | OUTPATIENT
Start: 2024-11-20 | End: 2024-11-22 | Stop reason: HOSPADM

## 2024-11-20 RX ORDER — ENOXAPARIN SODIUM 100 MG/ML
40 INJECTION SUBCUTANEOUS EVERY 24 HOURS
Status: DISCONTINUED | OUTPATIENT
Start: 2024-11-20 | End: 2024-11-22 | Stop reason: HOSPADM

## 2024-11-20 RX ORDER — ACETAMINOPHEN 325 MG/1
975 TABLET ORAL EVERY 6 HOURS
Status: DISCONTINUED | OUTPATIENT
Start: 2024-11-20 | End: 2024-11-22 | Stop reason: HOSPADM

## 2024-11-20 RX ORDER — HYDRALAZINE HYDROCHLORIDE 10 MG/1
20 TABLET, FILM COATED ORAL ONCE
Status: COMPLETED | OUTPATIENT
Start: 2024-11-20 | End: 2024-11-20

## 2024-11-20 RX ORDER — NIFEDIPINE 10 MG/1
CAPSULE ORAL
Status: COMPLETED
Start: 2024-11-20 | End: 2024-11-20

## 2024-11-20 RX ORDER — HYDROCORTISONE 25 MG/G
CREAM TOPICAL 3 TIMES DAILY PRN
Status: DISCONTINUED | OUTPATIENT
Start: 2024-11-20 | End: 2024-11-22 | Stop reason: HOSPADM

## 2024-11-20 RX ORDER — IBUPROFEN 800 MG/1
800 TABLET, FILM COATED ORAL EVERY 6 HOURS
Status: DISCONTINUED | OUTPATIENT
Start: 2024-11-20 | End: 2024-11-22 | Stop reason: HOSPADM

## 2024-11-20 RX ORDER — OXYCODONE HYDROCHLORIDE 5 MG/1
5 TABLET ORAL EVERY 4 HOURS PRN
Status: DISCONTINUED | OUTPATIENT
Start: 2024-11-20 | End: 2024-11-22 | Stop reason: HOSPADM

## 2024-11-20 RX ORDER — ONDANSETRON 2 MG/ML
4 INJECTION INTRAMUSCULAR; INTRAVENOUS EVERY 6 HOURS PRN
Status: DISCONTINUED | OUTPATIENT
Start: 2024-11-20 | End: 2024-11-22 | Stop reason: HOSPADM

## 2024-11-20 RX ADMIN — Medication 100 MG: at 23:14

## 2024-11-20 RX ADMIN — NIFEDIPINE 10 MG: 10 CAPSULE ORAL at 16:50

## 2024-11-20 RX ADMIN — HYDRALAZINE HYDROCHLORIDE 10 MG: 10 TABLET ORAL at 15:38

## 2024-11-20 RX ADMIN — HYDRALAZINE HYDROCHLORIDE 20 MG: 10 TABLET ORAL at 17:03

## 2024-11-20 RX ADMIN — LABETALOL HYDROCHLORIDE 800 MG: 200 TABLET, FILM COATED ORAL at 17:01

## 2024-11-20 RX ADMIN — NIFEDIPINE 60 MG: 30 TABLET, FILM COATED, EXTENDED RELEASE ORAL at 19:31

## 2024-11-20 RX ADMIN — IBUPROFEN 800 MG: 800 TABLET, FILM COATED ORAL at 21:21

## 2024-11-20 ASSESSMENT — ACTIVITIES OF DAILY LIVING (ADL)
ADLS_ACUITY_SCORE: 0

## 2024-11-20 NOTE — H&P
[facility]  OB History and Physical      Vanessa Turcios MRN# [id number]   Age: 29 year old YOB: 1995     CC:  Elevated Blood Pressure     HPI:  Vanessa Turcios is a 29 year old  now PPD#5 from PLTCS at 31w6d in the setting of preE with SF. Patient received IV Mg PP and was up titrated on medications. She was overall doing quite well and was discharged yesterday () on maximum doses of Labetalol and Nifedipine and 25mg of Hydrochlorothiazide. She was given 2 doses of IV Lasix during her previous admission and her weight has been down-trending appropriately. She reports swelling is much improved, however still present.     Patient was discharged with a BP cuff and noted elevated blood pressures at home and therefore was recommended to come to triage. She physically feels well. She denies any HA, vision changes, chest pain, SOB. No RUQ pain.  Reports incisional pain is improving daily.     She is understandable frustrated about being back in the hospital, but also understands the importance of observing her overnight on a new medication.     Prenatal Labs:   Lab Results   Component Value Date    AS Negative 2024    HEPBANG Nonreactive 2024    CHPCRT Negative 2018    GCPCRT  2015     Negative   Negative for N. gonorrhoeae rRNA by transcription mediated amplification.   A negative result by transcription mediated amplification does not preclude the   presence of N. gonorrhoeae infection because results are dependent on proper   and adequate collection, absence of inhibitors, and sufficient rRNA to be   detected.      HGB 11.4 (L) 2024         OB History  OB History    Para Term  AB Living   1 1 0 1 0 1   SAB IAB Ectopic Multiple Live Births   0 0 0 0 1      # Outcome Date GA Lbr Dagoberto/2nd Weight Sex Type Anes PTL Lv   1  11/15/24 31w6d   F CS-LTranv Spinal N ALEN      Name: Female-Vanessa Turcios      Apgar1: 8  Apgar5: 9       Obstetric Comments   HTN and Other-please add details  HSV and developmental delays           PMHx:   Past Medical History:   Diagnosis Date    Acne     Development delay     Elevated liver enzymes 10/25/2024    Encounter for supervision of low-risk first pregnancy in third trimester 10/25/2024    Genital herpes 2013    Gestational HTN, third trimester 10/25/2024     PSHx:   Past Surgical History:   Procedure Laterality Date     SECTION N/A 11/15/2024    Procedure:  section;  Surgeon: Mirta Garcia MD;  Location:  L+D    NO HISTORY OF SURGERY      WISDOM TOOTH EXTRACTION      in high school     Meds:   Medications Prior to Admission   Medication Sig Dispense Refill Last Dose/Taking    acetaminophen (TYLENOL) 325 MG tablet Take 2 tablets (650 mg) by mouth every 6 hours as needed for mild pain. Start after Delivery. 100 tablet 0     desoximetasone (TOPICORT) 0.25 % OINT ointment Apply topically 2 times daily as needed for inflammation.       hydrochlorothiazide (HYDRODIURIL) 25 MG tablet Take 1 tablet (25 mg) by mouth daily. 45 tablet 0     ibuprofen (ADVIL/MOTRIN) 600 MG tablet Take 1 tablet (600 mg) by mouth every 6 hours as needed for moderate pain. Start after delivery 60 tablet 0     labetalol (NORMODYNE) 200 MG tablet Take 4 tablets (800 mg) by mouth every 8 hours. 300 tablet 0     Magnesium Glycinate 120 MG CAPS Take 120 mg by mouth daily.       NIFEdipine ER OSMOTIC (ADALAT CC) 60 MG 24 hr tablet Take 1 tablet (60 mg) by mouth 2 times daily. 90 tablet 0     oxyCODONE (ROXICODONE) 5 MG tablet Take 1 tablet (5 mg) by mouth every 6 hours as needed for moderate to severe pain. 9 tablet 0     Prenatal Vit-Fe Fumarate-FA (PRENATAL MULTIVITAMIN  PLUS IRON) 27-1 MG TABS Take by mouth daily       senna-docusate (SENOKOT-S/PERICOLACE) 8.6-50 MG tablet Take 1 tablet by mouth daily. Start after delivery. 100 tablet 0     valACYclovir (VALTREX) 500 MG tablet Take 1 tablet (500 mg) by mouth 2  "times daily (Patient not taking: Reported on 2024) 6 tablet 3      Allergies:  No Known Allergies   FmHx:   Family History   Problem Relation Age of Onset    No Known Problems Mother     No Known Problems Father     Colon Cancer Maternal Grandmother     Diabetes Maternal Grandmother     Heart Disease Maternal Grandfather     No Known Problems Paternal Grandmother     No Known Problems Paternal Grandfather     No Known Problems Brother     No Known Problems Sister     Breast Cancer Paternal Aunt             No Known Problems Other          PE:  Vit: Patient Vitals for the past 4 hrs:   BP Temp Temp src Pulse Resp Height Weight   24 1711 (!) 154/84 -- -- -- -- -- --   24 1701 (!) 190/104 -- -- 64 -- -- --   24 1647 (!) 171/81 -- -- -- -- -- --   24 1631 (!) 184/101 -- -- -- -- -- --   24 1513 (!) 155/96 -- -- -- -- -- --   24 1502 -- -- -- -- -- 1.549 m (5' 1\") 81.9 kg (180 lb 9.6 oz)   24 1458 (!) 146/89 -- -- -- -- -- --   24 1443 (!) 149/89 -- -- -- -- -- --   24 1428 (!) 150/83 -- -- -- -- -- --   24 1412 (!) 162/89 98  F (36.7  C) Oral -- 16 -- --      Gen: Well-appearing, NAD, comfortable  CV: Well perfused, RRR  Pulm: Breathing comfortably. Decreased breath sounds posteriorly  Ext: +1 LE edema b/l     Assessment/Plan:  Vanessa Turcios is a 29 year old  now POD#6 s/p PLTCS in the setting of preeclampsia with severe features. Patient received 24 hours of IV Magnesium and was discharged on maximum doses of labetalol and Nifedipine and started on hydrochlorothiazide with severe range blood pressures at home. Patient asymptomatic, however now with elevated LFTs and difficult to control blood pressures. Will plan to add on PO Hydralazine and observe overnight.     # Preeclampsia with severe features  - Multiple sustained severe range blood pressures in triage s/p IR Nifed. If blood pressures are uncontrolled with medications, will recommend " admitting to ICU for blood pressure control.   - AST 49>57>> 34>108, ALT 47>54>> 31>137, o/w HELLP nl (11/20); AM CBC, CMP ordered  - Meds: PTA nifed 60/60, HCTZ, labetalol 800 TID; Will start PO Hydral today. Discussed starting enalapril, however patient is pumping for her baby in the NICU at 32 weeks. Discussed our recommendation to avoid enalapril if breast feeding <36 weeks.  -  s/p IR Nifed 1645 for SSR BP  - S/p 24hrs Mg (0430 11/16);  no evidence to repeat magnesium infusion at this time.   - Weight: 174 lbs 11/16 --> 185lbs 11/17 --> 182 lbs 11/19--> 180 lbs 11/20  - Symptoms: Denies    #Post-Partum   - Routine PP cares   - Pain: Tylenol, Ibuprofen, PRN Oxy     The patient was discussed with Dr. Polanco who is in agreement with the treatment plan.    Livier Mcnally MD  OB/GYN Resident, PGY-4    Staff MD Note    I appreciate the note by Dr. Mcnally.  Any necessary changes have been made by me.  I discussed the patient with the resident and agree with the findings and plan of care as documented in the note.    Lucrecia Polanco MD

## 2024-11-20 NOTE — PROVIDER NOTIFICATION
11/20/24 1418   Provider Notification   Provider Name/Title Dr. Bauer   Method of Notification In Department   Request Evaluate - Remote   Notification Reason Maternal Vital Sign Change     PP pt discharge yesterday with SR BP at home. First /89. Could you place a hypertension order set?

## 2024-11-20 NOTE — CONSULTS
"Consult received for Vascular access care.  See LDA for details. For additional needs place \"Nursing to Consult for Vascular Access\" [id number] order in EPIC.  "

## 2024-11-20 NOTE — PROVIDER NOTIFICATION
11/20/24 1345   Provider Notification   Provider Name/Title Dr. Bauer   Method of Notification In Department   Request Evaluate-Remote   Notification Reason Lab Results     Labs have resulted.  and . On 11/17 ALT 31 and AST 34.

## 2024-11-20 NOTE — TELEPHONE ENCOUNTER
Routed chart to [facility] and contacted RN   Alerted them to pt and situation.  Verified ER follow-up at this time.  José Hoff RN on 11/20/2024 at 11:41 AM   [facility]

## 2024-11-20 NOTE — PROGRESS NOTES
Pt arrived to L&D from home stating that she was having systolic BP in the 160s at home. She had a c/s on 11/15 and her infant is in the NICU. She denies HA blurry vision, SOB and RUQ pain. Reflexes 1+, no clonus. Lung sounds clear bilaterally. SR BP treated with oral procardia, see MAR. PIV placed in Left forearm. Admission completed. Pt transferred to . Report given to ADOLOF Franco.

## 2024-11-20 NOTE — PROGRESS NOTES
[facility]  Gynecology Discharge Summary    Vanessa Turcios    YOB: 1995  MRN# [id number]   Age: 29 year old         Date of Admission:  2024  Date of Discharge:  ***  Admitting Physician:  Lucrecia Polanco MD  Discharge Physician:  ***  Discharging Service:  Gynecology***     Primary Provider: Lilia Salguero    Admission Diagnosis:  - POD#6 PLTCS  - Pre Eclampsia with SF s/p Magnesium  - Genital HSV  - Elevated Liver Enzymes  - Adjustment Disorder     Discharge Diagnosis:  - Same***    Admission History:  Vanessa Tucrios is a 29 year old  now POD#6 s/p PLTCS in the setting of preeclampsia with severe features. Patient received 24 hours of IV Magnesium and was discharged on maximum doses of labetalol and Nifedipine and started on hydrochlorothiazide with severe range blood pressures at home. Patient asymptomatic, however now with elevated LFTs and difficult to control blood pressures. Will plan to add on PO Hydralazine and admit for continued         Hospital Course:  Her postoperative course was uncomplicated***. She was initially maintained on IV fluids with IV pain medications and wilson catheter in place. On POD#***1, diet was advanced, wilson catheter was removed, and she was transitioned to PO pain medications. On POD#*** she was tolerating regular diet, ambulating without difficulty, voiding spontaneously, and controlling pain with PO medications, and she was deemed stable for discharge. Hemoglobin at the time of discharge was ***.    Discharge Plans:  - The patient was discharged to home  - PO Activity:   - No lifting >15 lbs or strenuous exercise for 6 weeks   - No driving while taking narcotics or until you can slam on the brakes without pain  - She was instructed to call the clinic or return to ED if she has any of the following:    - Temperature greater than 100.4F   - Pain not controlled by pain medications   - Uncontrolled  nausea/vomiting   - Foul-smelling vaginal discharge   - Vaginal bleeding soaking 1 pad per hour for 2 hours in a row  - She will follow up with  *** on ***    Discharge medications include:     Review of your medicines        UNREVIEWED medicines. Ask your doctor about these medicines        Dose / Directions   acetaminophen 325 MG tablet  Commonly known as: TYLENOL  Used for: S/P  section      Dose: 650 mg  Take 2 tablets (650 mg) by mouth every 6 hours as needed for mild pain. Start after Delivery.  Quantity: 100 tablet  Refills: 0     desoximetasone 0.25 % Oint ointment  Commonly known as: TOPICORT      Apply topically 2 times daily as needed for inflammation.  Refills: 0     hydrochlorothiazide 25 MG tablet  Commonly known as: HYDRODIURIL  Used for: Severe pre-eclampsia, antepartum      Dose: 25 mg  Take 1 tablet (25 mg) by mouth daily.  Quantity: 45 tablet  Refills: 0     ibuprofen 600 MG tablet  Commonly known as: ADVIL/MOTRIN  Used for: S/P  section      Dose: 600 mg  Take 1 tablet (600 mg) by mouth every 6 hours as needed for moderate pain. Start after delivery  Quantity: 60 tablet  Refills: 0     labetalol 200 MG tablet  Commonly known as: NORMODYNE  Used for: Severe pre-eclampsia, antepartum      Dose: 800 mg  Take 4 tablets (800 mg) by mouth every 8 hours.  Quantity: 300 tablet  Refills: 0     Magnesium Glycinate 120 MG Caps      Dose: 120 mg  Take 120 mg by mouth daily.  Refills: 0     NIFEdipine ER 60 MG 24 hr tablet  Commonly known as: ADALAT CC  Used for: Severe pre-eclampsia, antepartum      Dose: 60 mg  Take 1 tablet (60 mg) by mouth 2 times daily.  Quantity: 90 tablet  Refills: 0     oxyCODONE 5 MG tablet  Commonly known as: ROXICODONE  Used for: S/P  section      Dose: 5 mg  Take 1 tablet (5 mg) by mouth every 6 hours as needed for moderate to severe pain.  Quantity: 9 tablet  Refills: 0     prenatal multivitamin  plus iron 27-1 MG Tabs      Take by mouth daily  Refills:  0     senna-docusate 8.6-50 MG tablet  Commonly known as: SENOKOT-S/PERICOLACE  Used for: S/P  section      Dose: 1 tablet  Take 1 tablet by mouth daily. Start after delivery.  Quantity: 100 tablet  Refills: 0     valACYclovir 500 MG tablet  Commonly known as: VALTREX  Used for: Herpes simplex vulvovaginitis      Dose: 500 mg  Take 1 tablet (500 mg) by mouth 2 times daily  Quantity: 6 tablet  Refills: 3                ***

## 2024-11-20 NOTE — PROVIDER NOTIFICATION
11/20/24 1635   Provider Notification   Provider Name/Title Dr. Mcnally   Method of Notification In Department   Request Evaluate-Remote   Notification Reason Vital Signs Change     /101, will repeat in 15 min.

## 2024-11-21 ENCOUNTER — TELEPHONE (OUTPATIENT)
Dept: PSYCHIATRY | Facility: CLINIC | Age: 29
End: 2024-11-21
Payer: COMMERCIAL

## 2024-11-21 ENCOUNTER — PATIENT OUTREACH (OUTPATIENT)
Dept: CARE COORDINATION | Facility: CLINIC | Age: 29
End: 2024-11-21
Payer: COMMERCIAL

## 2024-11-21 VITALS
TEMPERATURE: 98.8 F | HEART RATE: 59 BPM | RESPIRATION RATE: 16 BRPM | DIASTOLIC BLOOD PRESSURE: 85 MMHG | WEIGHT: 176 LBS | BODY MASS INDEX: 33.23 KG/M2 | SYSTOLIC BLOOD PRESSURE: 126 MMHG | HEIGHT: 61 IN

## 2024-11-21 LAB
ALBUMIN SERPL BCG-MCNC: 3.5 G/DL (ref 3.5–5.2)
ALP SERPL-CCNC: 110 U/L (ref 40–150)
ALT SERPL W P-5'-P-CCNC: 152 U/L (ref 0–50)
ANION GAP SERPL CALCULATED.3IONS-SCNC: 11 MMOL/L (ref 7–15)
AST SERPL W P-5'-P-CCNC: 96 U/L (ref 0–45)
BILIRUB SERPL-MCNC: 0.2 MG/DL
BUN SERPL-MCNC: 24.3 MG/DL (ref 6–20)
CALCIUM SERPL-MCNC: 8.9 MG/DL (ref 8.8–10.4)
CHLORIDE SERPL-SCNC: 104 MMOL/L (ref 98–107)
CREAT SERPL-MCNC: 0.7 MG/DL (ref 0.51–0.95)
EGFRCR SERPLBLD CKD-EPI 2021: >90 ML/MIN/1.73M2
ERYTHROCYTE [DISTWIDTH] IN BLOOD BY AUTOMATED COUNT: 11.9 % (ref 10–15)
GLUCOSE SERPL-MCNC: 88 MG/DL (ref 70–99)
HCO3 SERPL-SCNC: 22 MMOL/L (ref 22–29)
HCT VFR BLD AUTO: 35.6 % (ref 35–47)
HGB BLD-MCNC: 12.1 G/DL (ref 11.7–15.7)
MCH RBC QN AUTO: 29.7 PG (ref 26.5–33)
MCHC RBC AUTO-ENTMCNC: 34 G/DL (ref 31.5–36.5)
MCV RBC AUTO: 87 FL (ref 78–100)
PLATELET # BLD AUTO: 233 10E3/UL (ref 150–450)
POTASSIUM SERPL-SCNC: 3.9 MMOL/L (ref 3.4–5.3)
PROT SERPL-MCNC: 6.4 G/DL (ref 6.4–8.3)
RBC # BLD AUTO: 4.08 10E6/UL (ref 3.8–5.2)
SODIUM SERPL-SCNC: 137 MMOL/L (ref 135–145)
WBC # BLD AUTO: 6.3 10E3/UL (ref 4–11)

## 2024-11-21 PROCEDURE — 84520 ASSAY OF UREA NITROGEN: CPT

## 2024-11-21 PROCEDURE — 82247 BILIRUBIN TOTAL: CPT

## 2024-11-21 PROCEDURE — 120N000002 HC R&B MED SURG/OB UMMC

## 2024-11-21 PROCEDURE — 250N000013 HC RX MED GY IP 250 OP 250 PS 637

## 2024-11-21 PROCEDURE — 85027 COMPLETE CBC AUTOMATED: CPT

## 2024-11-21 PROCEDURE — 36415 COLL VENOUS BLD VENIPUNCTURE: CPT

## 2024-11-21 PROCEDURE — 82040 ASSAY OF SERUM ALBUMIN: CPT

## 2024-11-21 RX ORDER — HYDRALAZINE HYDROCHLORIDE 50 MG/1
50 TABLET, FILM COATED ORAL 4 TIMES DAILY
Status: DISCONTINUED | OUTPATIENT
Start: 2024-11-21 | End: 2024-11-22 | Stop reason: HOSPADM

## 2024-11-21 RX ORDER — HYDRALAZINE HYDROCHLORIDE 10 MG/1
30 TABLET, FILM COATED ORAL 4 TIMES DAILY
Qty: 360 TABLET | Refills: 0 | Status: SHIPPED | OUTPATIENT
Start: 2024-11-21 | End: 2024-11-22

## 2024-11-21 RX ADMIN — Medication 30 MG: at 08:00

## 2024-11-21 RX ADMIN — Medication 30 MG: at 12:05

## 2024-11-21 RX ADMIN — LABETALOL HYDROCHLORIDE 800 MG: 200 TABLET, FILM COATED ORAL at 09:49

## 2024-11-21 RX ADMIN — HYDRALAZINE HYDROCHLORIDE 50 MG: 50 TABLET ORAL at 22:03

## 2024-11-21 RX ADMIN — NIFEDIPINE 60 MG: 30 TABLET, FILM COATED, EXTENDED RELEASE ORAL at 08:00

## 2024-11-21 RX ADMIN — IBUPROFEN 800 MG: 800 TABLET, FILM COATED ORAL at 16:26

## 2024-11-21 RX ADMIN — Medication 100 MG: at 21:59

## 2024-11-21 RX ADMIN — LABETALOL HYDROCHLORIDE 800 MG: 200 TABLET, FILM COATED ORAL at 21:58

## 2024-11-21 RX ADMIN — IBUPROFEN 800 MG: 800 TABLET, FILM COATED ORAL at 21:59

## 2024-11-21 RX ADMIN — IBUPROFEN 800 MG: 800 TABLET, FILM COATED ORAL at 03:22

## 2024-11-21 RX ADMIN — LABETALOL HYDROCHLORIDE 800 MG: 200 TABLET, FILM COATED ORAL at 00:56

## 2024-11-21 RX ADMIN — HYDROCHLOROTHIAZIDE 25 MG: 25 TABLET ORAL at 08:00

## 2024-11-21 RX ADMIN — HYDRALAZINE HYDROCHLORIDE 50 MG: 50 TABLET ORAL at 16:26

## 2024-11-21 RX ADMIN — NIFEDIPINE 60 MG: 30 TABLET, FILM COATED, EXTENDED RELEASE ORAL at 20:13

## 2024-11-21 RX ADMIN — IBUPROFEN 800 MG: 800 TABLET, FILM COATED ORAL at 09:49

## 2024-11-21 NOTE — PROVIDER NOTIFICATION
11/20/24 1934   Provider Notification   Provider Name/Title Dr Ramos   Method of Notification Electronic Page     Pt is requesting tylenol for cramping.  Asked G2 if it is okay to give Tylenol 975 mg as ordered with elevated liver enzymes and clarified if he is following this pt.

## 2024-11-21 NOTE — PLAN OF CARE
Goal Outcome Evaluation:      Plan of Care Reviewed With: patient    Overall Patient Progress: improving    Afebrile, blood pressures mild range, intermittently bradycardic-asymptomatic, other VSS. Denies headache, visual changes or epigastric pain. Reflexes 1+, no clonus. Patient ambulating and voiding without difficulty. Weight down 4 pounds from last evening. Hydralazine ordered for this am. Patient states cramping pain well managed with ibuprofen, declines additional medications or interventions at this time.Will continue to provide support and education as needed. Continue present cares.

## 2024-11-21 NOTE — PROVIDER NOTIFICATION
FYI, labs done. ALT climbed to 152 from 137. AST trending down but still elevated at 96. Dr. MEENA Lombardi text paged and updated.

## 2024-11-21 NOTE — PLAN OF CARE
Goal Outcome Evaluation:      Plan of Care Reviewed With: patient        Problem: Hypertensive Disorders in Pregnancy  Goal: Patient-Fetal Stabilization  Intervention: Optimize Blood Pressure and Fluid Status  Recent Flowsheet Documentation  Taken 11/21/2024 0800 by Shelly Cates RN  Fluid/Electrolyte Management: fluids provided        's/90's. Denies HA, blurry vision, SOB and epigastric pain. Pumping for baby in NICU. C/o some cramping pain and medicated with ibuprofen with some relief. Tylenol on hold for elevated LFTs. Hydralazine PO started this morning. Tolerating regular diet. FOB here and supportive. In NICU at the moment. Will continue to monitor. BPs with possible discharge this evening.

## 2024-11-21 NOTE — PROGRESS NOTES
Connected Care Resource Center: [facility]    Background: Transitional Care Management program identified per system criteria and reviewed by [facility] team for possible outreach.    Assessment: Upon chart review, [facility] Team member will not proceed with patient outreach related to this episode of Transitional Care Management program due to reason below:    Patient has presented to Emergency Department, been readmitted to hospital, or transferred to another hospital.    Plan: Transitional Care Management episode addressed appropriately per reason noted above.      SUZANNE Brantley  [facility] Resource [facility]    *Connected Care Resource Team does NOT follow patient ongoing. Referrals are identified based on internal discharge reports and the outreach is to ensure patient has an understanding of their discharge instructions.

## 2024-11-21 NOTE — PROGRESS NOTES
Summary:  pt arrived into room 7132 via wheelchair with Ang.  Bedside with ADOLFO Guzman.  Continuing with q 15 minute BP checks.  Dr Ramos updated throughout.  Per Dr CHARLES, hold tylenol for now.  Okay to continue with q 30 minute BP checks and give scheduled BP meds.  Marielena MATA updated and given bedside report at 1919.  Vanessa denies any HARRY, vision changes or epigastric pain.  Reports cramping while/after breast pumping.  Teresita in NICU here.  Family orientated to room.  Call light is within reach.

## 2024-11-21 NOTE — CONSULTS
SPIRITUAL HEALTH SERVICES Consult Note  [facility] ([facility]) [facility]    Supportive check in with Vanessa per her readmittance due to elevated BP. Vanessa reports feeling well, if a little disappointed to have to come back to the hospital so soon. She is coping well, and would prefer to remain inpatient until more certain that BPs are stable, especially since baby Teresita is still in the NICU. Vanessa is relieved that Teresita is doing so well, which helps her to feel at peace.     Vanessa and I discussed her support from Ang, his final semester of schooling, this sudden change in their long term plans, and her work as a  and acupuncturist. She engages warmly and easily, and welcomes  support. Vanessa knows how request [facility] support should further needs arise.     Annette Block M.Div.  Associate   Pager [phone]  Reachable via momondo    * [facility] remains available 24/7 for emergent requests/referrals, either by having the switchboard page the on-call  or by entering an ASAP/STAT consult in Epic (this will also page the on-call ). Routine Epic consults receive an initial response within 24 hours.*

## 2024-11-21 NOTE — PROVIDER NOTIFICATION
11/20/24 1819   Provider Notification   Provider Name/Title dr Ramos   Method of Notification Electronic Page     Updated on initial BP/P upon arrival to [facility]. Plan to per order, recheck q 15 minutes for next hour.    At 1836, Dr Ramos asked to be updated if any need to be treated.

## 2024-11-21 NOTE — PROVIDER NOTIFICATION
11/20/24 1916   Provider Notification   Provider Name/Title Dr Ramos   Method of Notification Electronic Page     Asked to review BPs.  Updated now q 30 minute checks and that Marielena will give scheduled nifedipine now.

## 2024-11-21 NOTE — PROGRESS NOTES
At 1740 received phone report from tuan schwartz prior to transfer to [facility].  11/15 c/s with readmit for evaluated BPs, AST/ALT and bp med management.  NB in NICU.

## 2024-11-21 NOTE — PROVIDER NOTIFICATION
11/20/24 2000   Provider Notification   Provider Name/Title Dr. Pena   Method of Notification Electronic Page   Request Evaluate-Remote   Notification Reason Other     Vanessa is asking for ibuprofen for pain. She is also asking when she might be able to visit baby in NICU. Would you be able to talk with her sometime about the plan and NICU visits etc. Also, FYI, patient is declining lovinox, she said she had declined when she was last here as well.    MD response: Ibuprofen ok to give. May also order flexeril and lidocaine patches. Patient should have at least 2 mild range blood pressures before she goes down to NICU given how labile they have been.

## 2024-11-21 NOTE — PROGRESS NOTES
"[facility]   Post-partum Progress Note    Name:  Vanessa Turcios  MRN: [id number]    S: Patient feeling well this am. No headache, chest pain, RUQ pain, SOB. Feels like swelling continues to improve and she is \"getting her body back.\" Would like to stay as long as needed in the hospital to get her blood pressures under control. Baby doing very well in the NICU. Reports mild incisional pain and cramping, but overall very well controlled. Continues to pump for baby.  O:   Patient Vitals for the past 24 hrs:   BP Temp Temp src Pulse Resp Height Weight   11/21/24 0622 (!) 133/91 98.1  F (36.7  C) Oral 59 16 -- --   11/21/24 0600 -- -- -- -- -- -- 79.8 kg (176 lb)   11/21/24 0200 (!) 135/91 98.2  F (36.8  C) Oral 60 16 -- --   11/21/24 0052 (!) 133/90 98.4  F (36.9  C) Oral 64 16 -- --   11/20/24 2334 (!) 141/93 -- -- 69 16 -- --   11/20/24 2235 129/74 -- -- 66 16 -- --   11/20/24 2133 126/74 -- -- 77 16 -- --   11/20/24 2045 120/71 98.5  F (36.9  C) Oral 64 16 -- --   11/20/24 2015 128/79 -- -- 65 16 -- --   11/20/24 1945 (!) 132/93 -- -- 64 16 -- --   11/20/24 1916 (!) 151/101 -- -- -- 16 -- --   11/20/24 1900 (!) 138/97 -- -- -- 16 -- --   11/20/24 1846 (!) 149/97 97.4  F (36.3  C) Oral -- 16 -- --   11/20/24 1830 (!) 141/91 -- -- -- 16 -- --   11/20/24 1817 (!) 143/95 -- -- -- 16 -- --   11/20/24 1750 (!) 146/86 -- -- -- -- -- --   11/20/24 1740 (!) 144/83 -- -- -- -- -- --   11/20/24 1730 (!) 148/87 -- -- -- -- -- --   11/20/24 1720 (!) 146/85 -- -- -- -- -- --   11/20/24 1711 (!) 154/84 -- -- -- -- -- --   11/20/24 1701 (!) 190/104 -- -- 64 -- -- --   11/20/24 1647 (!) 171/81 -- -- -- -- -- --   11/20/24 1631 (!) 184/101 -- -- -- -- -- --   11/20/24 1513 (!) 155/96 -- -- -- -- -- --   11/20/24 1502 -- -- -- -- -- 1.549 m (5' 1\") 81.9 kg (180 lb 9.6 oz)   11/20/24 1458 (!) 146/89 -- -- -- -- -- --   11/20/24 1443 (!) 149/89 -- -- -- -- -- --   11/20/24 1428 (!) 150/83 -- -- -- -- -- -- " "  24 1412 (!) 162/89 98  F (36.7  C) Oral -- 16 -- --     Gen:  Resting comfortably, NAD  CV:  RR, well perfused  Pulm:  Non-labored breathing on room air  Abd:  Soft, appropriately ttp, non-distended.  Incision:   Clean, dry, intact. No erythema, drainage or induration. Steri strips in place.    Ext:  non-tender, +1 edema to bilateral lower extremities    I/O last 3 completed shifts:  In: 1600 [P.O.:1600]  Out:  [Urine:]      Assessment/Plan:  Vanessa Turcios is a 29 year old  now POD#7 s/p PLTCS in the setting of preeclampsia with severe features and now HD#2 re admitted for blood pressure control. Patient received 24 hours of IV Magnesium and was discharged on maximum doses of labetalol and Nifedipine and started on hydrochlorothiazide with severe range blood pressures at home. Patient asymptomatic, however now with elevated LFTs and sustained severe range blood pressures on admission. PO Hydral added to regimen, with plan for continued observation today.     # Preeclampsia with severe features  Patient had normal blood pressures yesterday evening, with persistent mild range blood pressures overnight. Remains asymptomatic.   - AST 49>57>> 34>108, ALT 47>54>> 31>137, o/w HELLP nl (); AM CBC, CMP pending  - Meds: PTA nifed 60/60, HCTZ,, lab 800 TID; s/p 1 dose PO Hydral 30mg yesterday evening. Now HD#1 PO Hydral 30 QID.  -  s/p IR Nifed 1645 for SSR BP  - S/p 24hrs Mg (0430 );  no evidence to repeat magnesium infusion at this time.   - Weight: 174 lbs  --> 185lbs  --> 182 lbs --> 180 lbs   - Symptoms: Denies     # Postpartum management  Routine PP cares  - Encourage ambulation, IS, SCDs while confined to bed; declines lovenox       Livier Mcnally MD  OB/GYN Resident, PGY-4    BP (!) 133/91 (BP Location: Right arm, Patient Position: Semi-Gustafson's, Cuff Size: Adult Large)   Pulse 59   Temp 98.1  F (36.7  C) (Oral)   Resp 16   Ht 1.549 m (5' 1\")   Wt 79.8 kg (176 " lb)   LMP 04/10/2024   BMI 33.25 kg/m      180 lbs yesterday-->176 lbs today    The patient was seen and examined by me separately from the team.  I have reviewed and agree with the above note.  She is overall doing well today with no concerns.  She would be open to staying tonight if needed-just doesn't want to have to come back again.  Plan to watch her blood pressures though the day today with possible evening discharge.     Ana Smith MD, FACOG

## 2024-11-21 NOTE — TELEPHONE ENCOUNTER
Psychologist left message for Vanessa letting her know I have returned to office from conference and will plan to check in with her tomorrow.     Gisel Manzo, PhD, LP  , Dept of Psychiatry   [facility]

## 2024-11-21 NOTE — LACTATION NOTE
"This note was copied from a baby's chart.  Lactation Follow Up Note    Reason for visit/ call/ message:  Medication review after readmission for treatment of maternal hypertension    Supply:  Up to 110ml/pp on initiate setting, pumping every 3 hours with some disruptions in frequency today; now back in routine. Logging on an raimundo.  Lower extremity edema is improving she states; blood pressure being assessed. Possibly discharging this evening. Hydralazine added to medications, see below. Per Shea, all are considered safe in lactation. See considerations.    Significant changes (medications, equipment, comfort, etc):    Relevant Medication Reviewed Hale Risk Category Side Effects and Infant Monitoring Considerations   labetalol L2 sedation , pallor, not waking to feed/ poor feeding , and poor weight gain   nifedipine L2 sedation , pallor, not waking to feed/ poor feeding , and poor weight gain   hydralazine L2 sedation , pallor, not waking to feed/ poor feeding , and poor weight gain   hydrochlorothiazide L2 Observe for fluid loss, dehydration, lethargy, wegiht gain; monitor maternal milk supply.     \"Lactation Risk Categories\"  according to Valdivia's Medications and Mothers' Milk, 2023 by Ras Valdivia:  L2 Probably Compatible:   Drug which has been studied in a limited number of breastfeeding women without an increase in adverse effects in the infant. And/or, the evidence of a demonstrated risk which is likely to follow use of this medication in a breastfeeding woman is remote.      Hand Hugs/ Skin to Skin/ Oral Care/ Nuzzling/ Latching:  Holding skin to skin with visits to NICU. Baby now off NCPAP and in room air.   Discussed nuzzling at her breast.    Education given:  Discussed journey to oral feedings and plan to support her breastfeeding goals.  Switched to maintain setting; watch flow rather than time with pumping.  Encouraged maternal self care.    Plan:  Will continue to provide lactation support including " latch assistance when appropriate.      Dali Stephens, RNC-DAKSHA, IBCLC   Lactation Consultant  [place]: [facility] [phone]  Office: [phone]

## 2024-11-21 NOTE — PROVIDER NOTIFICATION
11/21/24 1715   Provider Notification   Provider Name/Title Dr. Mcnally   Method of Notification Electronic Page   Request Evaluate-Remote   Notification Reason Other     Patient is wondering if there is a plan for discharging tonight or tomorrow night.     Per Dr. Mcnally reasonable for patient to stay overnight given the medication titration. OK for VS to be Q8 overnight to allow for more restful sleep. Notify provider if two consecutive BPs of 140/90 per orders.

## 2024-11-22 ENCOUNTER — APPOINTMENT (OUTPATIENT)
Dept: PSYCHIATRY | Facility: CLINIC | Age: 29
End: 2024-11-22
Payer: COMMERCIAL

## 2024-11-22 VITALS
RESPIRATION RATE: 16 BRPM | TEMPERATURE: 98.3 F | BODY MASS INDEX: 32.72 KG/M2 | SYSTOLIC BLOOD PRESSURE: 118 MMHG | HEART RATE: 72 BPM | DIASTOLIC BLOOD PRESSURE: 71 MMHG | HEIGHT: 61 IN | WEIGHT: 173.28 LBS

## 2024-11-22 LAB
ALT SERPL W P-5'-P-CCNC: 130 U/L (ref 0–50)
AST SERPL W P-5'-P-CCNC: 71 U/L (ref 0–45)
HOLD SPECIMEN: NORMAL

## 2024-11-22 PROCEDURE — 84450 TRANSFERASE (AST) (SGOT): CPT

## 2024-11-22 PROCEDURE — 96156 HLTH BHV ASSMT/REASSESSMENT: CPT

## 2024-11-22 PROCEDURE — 84460 ALANINE AMINO (ALT) (SGPT): CPT

## 2024-11-22 PROCEDURE — 250N000013 HC RX MED GY IP 250 OP 250 PS 637

## 2024-11-22 PROCEDURE — 36415 COLL VENOUS BLD VENIPUNCTURE: CPT

## 2024-11-22 PROCEDURE — 250N000013 HC RX MED GY IP 250 OP 250 PS 637: Performed by: STUDENT IN AN ORGANIZED HEALTH CARE EDUCATION/TRAINING PROGRAM

## 2024-11-22 PROCEDURE — 99233 SBSQ HOSP IP/OBS HIGH 50: CPT | Mod: GC | Performed by: STUDENT IN AN ORGANIZED HEALTH CARE EDUCATION/TRAINING PROGRAM

## 2024-11-22 RX ORDER — LABETALOL 300 MG/1
600 TABLET, FILM COATED ORAL EVERY 8 HOURS
COMMUNITY
Start: 2024-11-22 | End: 2024-11-22

## 2024-11-22 RX ORDER — LABETALOL 200 MG/1
600 TABLET, FILM COATED ORAL 2 TIMES DAILY
Status: SHIPPED
Start: 2024-11-22

## 2024-11-22 RX ORDER — LABETALOL 200 MG/1
600 TABLET, FILM COATED ORAL EVERY 8 HOURS
Status: SHIPPED
Start: 2024-11-22 | End: 2024-11-22

## 2024-11-22 RX ORDER — LABETALOL 200 MG/1
600 TABLET, FILM COATED ORAL EVERY 12 HOURS SCHEDULED
Status: DISCONTINUED | OUTPATIENT
Start: 2024-11-22 | End: 2024-11-22 | Stop reason: HOSPADM

## 2024-11-22 RX ORDER — HYDRALAZINE HYDROCHLORIDE 50 MG/1
50 TABLET, FILM COATED ORAL 4 TIMES DAILY
Qty: 100 TABLET | Refills: 0 | Status: SHIPPED | OUTPATIENT
Start: 2024-11-22 | End: 2024-11-26

## 2024-11-22 RX ORDER — LABETALOL 200 MG/1
600 TABLET, FILM COATED ORAL EVERY 8 HOURS
Status: DISCONTINUED | OUTPATIENT
Start: 2024-11-22 | End: 2024-11-22

## 2024-11-22 RX ADMIN — HYDRALAZINE HYDROCHLORIDE 50 MG: 50 TABLET ORAL at 12:10

## 2024-11-22 RX ADMIN — LABETALOL HYDROCHLORIDE 600 MG: 200 TABLET, FILM COATED ORAL at 10:35

## 2024-11-22 RX ADMIN — HYDRALAZINE HYDROCHLORIDE 50 MG: 50 TABLET ORAL at 08:14

## 2024-11-22 RX ADMIN — NIFEDIPINE 60 MG: 30 TABLET, FILM COATED, EXTENDED RELEASE ORAL at 08:14

## 2024-11-22 RX ADMIN — HYDRALAZINE HYDROCHLORIDE 50 MG: 50 TABLET ORAL at 15:51

## 2024-11-22 RX ADMIN — IBUPROFEN 800 MG: 800 TABLET, FILM COATED ORAL at 04:44

## 2024-11-22 RX ADMIN — IBUPROFEN 800 MG: 800 TABLET, FILM COATED ORAL at 12:10

## 2024-11-22 ASSESSMENT — ACTIVITIES OF DAILY LIVING (ADL)
ADLS_ACUITY_SCORE: 0

## 2024-11-22 NOTE — PROVIDER NOTIFICATION
11/21/24 2126   Provider Notification   Provider Name/Title Dr. Mcnally   Method of Notification Electronic Page   Request Evaluate-Remote   Notification Reason Other  (Any parameters for hydralazine.)

## 2024-11-22 NOTE — PLAN OF CARE
Goal Outcome Evaluation:  Patient discharged. All education reviewed, questions addressed, medication reviewed and verified. Weight completed this morning. Discharged at 1551, patient declined anna assessment as she was discharging.

## 2024-11-22 NOTE — PROVIDER NOTIFICATION
11/21/24 2130   Provider Notification   Provider Name/Title Dr Spann   Method of Notification Electronic Page   Request Evaluate-Remote   Notification Reason Medication Request;Other     Do you want any parameters for the hydralazine? Also, had paged Dr Mcnally earlier but wanted to loop you in, held 6 pm labetolol dose due to HR being 58.     Per provider: recheck BP and HR and give labetolol if HR greater than 60. Ok to give hydralazine if BP <100/60. Hydralazine and Labetol given at 2200 (see MAR)

## 2024-11-22 NOTE — DISCHARGE INSTRUCTIONS
Checking Your Blood Pressure at Home  During and after pregnancy  How do I measure my blood pressure?  It s important to take the readings at the same time each day, such as morning and evening. Take your blood pressure before taking any morning medications.  How to get the most accurate reading  30 minutes before checking your blood pressure, avoid the following:  Drinking caffeine  Drinking alcohol  Eating  Smoking  Exercising  5 minutes before checking your blood pressure:  Use the bathroom and urinate so you have an empty bladder.  Sit still in a chair for around 5 minutes. Stay calm and relaxed and do not talk if possible.  To check your blood pressure:     Sit up straight in a chair.  Place your feet on the floor. Don t cross your ankles or legs.  Rest your arm at the level of your heart on a table or desk or on the arm of a chair. Use the same arm every day.  Pull up your shirt sleeve. Don t take the measurement over clothes.  Wrap the blood pressure cuff around the upper part of your left arm, 1 inch (2.5 cm) above your elbow.  Fit the cuff snugly around your arm. You should be able to place only one finger between the cuff and your arm.  Position the cord so that it rests in the bend of your elbow.  Press the power button.  Sit quietly while the cuff inflates and deflates.  Read the digital reading on the monitor screen and write the numbers down (record them) in a notebook.  Wait 2-3 minutes, then repeat the steps, starting at step 1.  Which features do you need?  Arm cuff monitors give the most exact readings.  Wrist and finger blood pressure monitors are often less exact.  Pick a blood pressure monitor that has passed tests to show they measure exactly. Blood pressure cuffs for sale in the [place] that have passed tests are listed on the website www.validatebp.org.  Some monitors that have passed tests are:  Omron 3 Series Upper Arm Blood Pressure Monitor (Model QL2197)  Omron 5 Series Upper Arm Blood  "Pressure Monitor (Model QA1416)  Omron 7 Series Upper Arm Blood Pressure Monitor (Model HEM-7320)  [employer] Upper Arm Blood Pressure Monitor with Talking Function (UA 1030T)  Don t use smartphone apps. There are many smartphone apps that claim to check your blood pressure using the pulse in your wrist or finger. These don t work. They haven t passed any tests. Don t give your clinic a blood pressure reading from a smartphone raimundo.  If you have a flexible spending account (FSA) or health savings account (HSA), you may wish to pay yourself back (reimburse) for the machine and cuff. A blood pressure monitor is an allowed over-the- counter (OTC) item to pay yourself back from these accounts.  Cuff size  The size of the arm cuff is a key feature. Make sure the cuff is the right size for your arm. If the cuff isn t the right size, readings will either be too high or low.  To know what size cuff to buy, measure the distance around your bicep (upper arm).  Use a flexible measuring tape or . Place the measuring tape MCFP between your armpit and elbow. Measure the distance around your arm in inches.  You may need to buy a cuff apart from the machine to get the right size.  Cuff sizes and arm measurements  Small adult: 22 to 26 cm (8.7 to 10.2 inches)  Adult: 27 to 34 cm (10.6 to 13.4 inches)  Large adult: 35 to 44 cm (13.8 to 17.3 inches)  Adult thigh: 45 to 52 cm (17.7 to 20.5 inches)    Copyright statement\" content=\"For informational purposes only. Not to replace the advice of your health care provider. Photo: ID [id number]   [employer]. Text copyright   2023 [facility]. All rights reserved. Clinically reviewed by [facility]. HomeTouch 682329 - REV 03/23.  You have been provided the Know Your Blood Pressure Numbers document.    Additional copies can be found here: [email or web address]ills [email or web address].pdf  "

## 2024-11-22 NOTE — PLAN OF CARE
Goal Outcome Evaluation:  VSS, BPs remain mildly elevated but the patient declines any severe features. Was given the option to go home tonight, but opted to stay as she is concerned about BP management and doesn't want another admission. Medications titrated today and given per MAR and provider direction. Mother and  at bedside and supportive of patient. Pumping and visiting infant in NICU. Plan of care ongoing.       Plan of Care Reviewed With: patient    Overall Patient Progress: improvingOverall Patient Progress: improving           Problem: Adult Inpatient Plan of Care  Goal: Optimal Comfort and Wellbeing  Outcome: Progressing  Intervention: Provide Person-Centered Care  Recent Flowsheet Documentation  Taken 2024 1715 by Lyndsay Martin RN  Trust Relationship/Rapport:   care explained   choices provided   emotional support provided   empathic listening provided   questions answered       Problem: Postpartum ( Delivery)  Goal: Optimal Pain Control and Function  Outcome: Progressing

## 2024-11-22 NOTE — PROGRESS NOTES
[facility]   Post-partum Progress Note    Name:  Vanessa Turcios  MRN: [id number]    S: Patient feeling well. Had a moment of mild light headedness yesterday when she was in the NICU, but overall has been asymptomatic. Pain well controlled. No HA, vision changes, RUQ pain.    O:   Patient Vitals for the past 24 hrs:   BP Temp Temp src Pulse Resp Weight   24 0805 118/88 98.3  F (36.8  C) Oral 57 16 --   24 0740 -- -- -- -- -- 78.6 kg (173 lb 4.5 oz)   24 0655 122/78 -- -- 58 -- --   24 0622 111/69 98.8  F (37.1  C) Oral 54 17 --   24 0444 109/70 -- -- 61 16 --   24 2324 126/85 98.4  F (36.9  C) Oral 59 16 --   24 2158 130/79 -- -- 64 -- --   24 1850 127/84 -- -- 58 16 --   24 1624 121/78 -- -- 62 16 --   24 1405 125/78 -- -- 56 16 --     Gen:  Resting comfortably, NAD  CV:  RR, well perfused  Pulm:  Non-labored breathing on room air    I/O last 3 completed shifts:  In: 860 [P.O.:860]  Out: 3420 [Urine:3420]    Assessment/Plan:  Vanessa Turcios is a 29 year old  now POD#8 s/p PLTCS in the setting of preeclampsia with severe features and now HD#3 re admitted for blood pressure control. Patient received 24 hours of IV Magnesium and was discharged on maximum doses of labetalol and Nifedipine and started on hydrochlorothiazide with severe range blood pressures at home. Patient asymptomatic, however now with elevated LFTs and sustained severe range blood pressures on admission. PO Hydral added to regimen, with plan for continued observation today.     # Preeclampsia with severe features  Patient normotensive overnight  - AST 49>57>> 34>108>96>71, ALT 47>54>> 31>137>152>130, o/w HELLP nl (); AST/ALT now downtrending   - Meds: PTA nifed 60/60, HCTZ,lab 800 TID; Now s/p 2D PO Hydral 30 QID>D#2 50 QID. Decreasing Labetalol to 600 TID; Will discontinue HCTZ now  -  s/p IR Nifed 1645 for SSR BP  - S/p 24hrs Mg (0430 );  no  evidence to repeat magnesium infusion at this time.   - Weight: 174 lbs 11/16 --> 185lbs 11/17 --> 182 lbs 11/19--> 180 lbs 11/20>176>173 today  - Symptoms: Denies     # Postpartum management  Routine PP cares  - Encourage ambulation, IS, SCDs while confined to bed; declines lovenox     Medically Ready for Discharge: Anticipated Today       Livier Mcnally MD  OB/GYN Resident, PGY-4    I personally examined and evaluated Vanessa Turcios on 11/22/2024.  I discussed the patient with Dr. Mcnally and agree with the presentation, exam and plan of care documented in this note with edits by me. Discussed further antihypertensive titration will be needed outpatient. Currently decreasing to labetalol 600mg TID but suspect will need to decrease further. Denies dizziness on current regiment but breastmilk supply is suppressed. Stopping hydrochlorothiazide today. Anticipate ability to discharge this afternoon. Discussed hold precautions of HR <55 or BP <100/60.   Chapis Liriano MD

## 2024-11-22 NOTE — PLAN OF CARE
Goal Outcome Evaluation:      Plan of Care Reviewed With: patient    Overall Patient Progress: improvingOverall Patient Progress: improving    VSS this shift. Patient taking Ibuprofen for pain control, Flexeril and Oxycodone available. Patient independent with cares, ambulating in room, voiding without difficulty, pumping and visiting  in NICU. Denies headache, vision changes, shortness of breath or RUQ pain. Continue with plan of care.

## 2024-11-22 NOTE — PROGRESS NOTES
"[facility]    CC: Postpartum visit    Subjective:   Vanessa Turcios is a 29 year old  who presents for a postpartum blood pressure check.  She had a female infant by primary CS on 11/15/24 at 31w6d due to pre-eclampsia and was readmitted on  for severe range BP.  She was discharged on  on labetalol 600mg TID, hydralazine 50mg QID and nifedipine 60/60. Her BP at home have been 120s/80s recently. Currently taking hydralazine 50mg QID and nifedipine 60/60 and has not taken labetalol for several days. Denies sx of severe features. Sleeping 8-9 hours with breaks for pumping. Pumping increased amounts in last several days and baby worked on [employer] yesterday. Lochia is minimal. No concerns about incision. Does have questions about contraception. Tried Mirena IUD and had frequent spotting for 2 years. Also has tried OCPs but had worse insomnia. Interested in non-hormonal options.      Objective:  /78   Pulse (!) 130   Ht 1.549 m (5' 1\")   Wt 79.1 kg (174 lb 4.8 oz)   LMP 04/10/2024   Breastfeeding Yes   BMI 32.93 kg/m   HR 76 on manual recheck (suspect equipment malfunction)  General: Well-appearing, no distress  Abdomen: Soft, nontender  Incision:steri-strips removed, skin well approximated, no discharge    Assessment/plan:  Vanessa Turcios is a 29 year old now  here for a BP check following PLTCS for preE with SF.     Postpartum visit:  -Contraception:discussed POPs, condoms, Paragard, Fem cup and diaphragm. NFP is not an option while not having regular menses. Discussed lactational amenorrhea and criteria for use. She will think about her options further.   -Discussed return to intercourse and exercise  -[facility]  - continue hydralazine 50mg QID and nifedipine 60/60. Refills of 25mg hydralazine given in anticipation of tapering in the next several weeks. Continue with [facility]-BP program and entering values in " [place].     Chapis Liriano MD    A total of 28 minutes was spent by myself reviewing results, prior history, imaging, face-to-face with the patient and documentation.

## 2024-11-22 NOTE — PLAN OF CARE
Goal Outcome Evaluation: VSS. +1 reflexes, no clonus. Denies pre-e symptoms. Postpartum checks WDL. Incision RAMON with steri-strips in place. Pain managed with ibuprofen. Up independently, voiding without difficulty. Pumping for baby in the NICU.

## 2024-11-22 NOTE — PROVIDER NOTIFICATION
11/21/24 2012   Provider Notification   Provider Name/Title Dr. Mcnally   Method of Notification Electronic Page   Request Evaluate-Remote   Notification Reason Other  (Held 6 p labetolol dose. HR was 58.)     She was in the shower until 7, thus delay in medication and notification.

## 2024-11-22 NOTE — PROVIDER NOTIFICATION
11/22/24 0637   Provider Notification   Provider Name/Title Dr. BRITTANY Spann   Method of Notification Electronic Page   Request Evaluate-Remote   Notification Reason Other     Pt's HR is 54, /69, should I still give Labetalol or hold med.     Per Dr. Spann to recheck BP and give if above 60.    Rechecked HR was 58--order given by Dr. Castorena to hold Labetalol

## 2024-11-24 ENCOUNTER — PATIENT OUTREACH (OUTPATIENT)
Dept: CARE COORDINATION | Facility: CLINIC | Age: 29
End: 2024-11-24
Payer: COMMERCIAL

## 2024-11-24 ENCOUNTER — DOCUMENTATION ONLY (OUTPATIENT)
Dept: OBGYN | Facility: CLINIC | Age: 29
End: 2024-11-24
Payer: COMMERCIAL

## 2024-11-24 NOTE — CONFIDENTIAL NOTE
Telephone Note    Called patient at 2300 11/23 after receiving page that she was having heart racing and feeling woozy.    Called patient. She states she took her PM nifedipine and hydralazine, held her labetalol. She was riding home from dinner in the car when she felt her heart racing. She felt a little light headed when she got home. Denies chest pain, SOB, calf tenderness or unilateral swelling. Took her vitals at that time, /64, pulse 95.    At time of me calling, patient feeling a little bit better. Reviewed that I suspect she is feeling symptomatic from relative hypotension. Agreed that she should hold her labetalol. With absence of other symptoms for pathologic process, recommended she take her BP in the AM, if >120/80, can take her nifedipine and hydralazine as she did today. If less than 120/80, will call for medication management. She states her heart rate is typically quite low, so that is why she elected to hold the labetalol yesterday.     Josh Gonzales MD    [facility]  Obstetrics, Gynecology, and Women's Health  7:11 AM 11/24/2024

## 2024-11-24 NOTE — PROGRESS NOTES
Connected Care Resource Center: [facility]    Background: Transitional Care Management program identified per system criteria and reviewed by [facility] [place] team for possible outreach.    Assessment: Upon chart review, CCR Team member will not proceed with patient outreach related to this episode of Transitional Care Management program due to reason below:    Patient has active communication with a nurse, provider or care team for reason of post-hospital follow up plan.  Outreach call by CCRC team not indicated to minimize duplicative efforts.     Plan: Transitional Care Management episode addressed appropriately per reason noted above.      Monica Byrd MA  Connected [facility] [place], [facility]    *Connected Care Resource Team does NOT follow patient ongoing. Referrals are identified based on internal discharge reports and the outreach is to ensure patient has an understanding of their discharge instructions.

## 2024-11-25 ENCOUNTER — TELEPHONE (OUTPATIENT)
Dept: MATERNAL FETAL MEDICINE | Facility: CLINIC | Age: 29
End: 2024-11-25
Payer: COMMERCIAL

## 2024-11-25 ENCOUNTER — LACTATION ENCOUNTER (OUTPATIENT)
Dept: OTHER | Facility: CLINIC | Age: 29
End: 2024-11-25

## 2024-11-25 NOTE — TELEPHONE ENCOUNTER
Home Observation of Postpartum Elevated BP (HOPE-BP) Program     Vanessa Turcios enrolled in the HOPE-BP Program on 11/19/2024, 6 days ago. Delivered on 11/15/2024. Diagnosis: Preeclampsia with severe features. Medication(s) prescribed: Nifedipine ER and Hydralazine .  Patient reported the following blood pressures in the past 72 hours:       11/20/2024 11/23/2024 11/24/2024 11/25/2024   [facility] Health Trends BP Review Flowsheet   Systolic (Patient Reported) 159     143  125     129  128  128    Diastolic (Patient Reported) 105     90  67     74  82  84        Patient-reported    Multiple values from one day are sorted in reverse-chronological order     Writer called and spoke with patient. Introduced patient to the program, reviewed goals of program and contact information, patient VU. Patient reports she is feeling well. Reports yesterday she reached out to her clinic due to feeling dizzy and was recommended to hold her Labetalol. Reports she did not take it yesterday or today so far. Her BP this morning was 128/84. She is currently taking Hydralazine 50mg QID, and Nifedipine 60mg BID. She denied: chest pain, difficulty breathing or shortness of breath, right upper quadrant pain, unsteady gait, weakness, intractable headache, blurred vision, dizziness, angioedema or lip swelling, leg edema, difficulty swallowing, palpitations, rash, acute joint redness or pain (gout), and symptomatic hypotension. Encouraged patient to continue checking her BP twice a day, enter into Mychart and take medications as prescribed, patient VU.

## 2024-11-25 NOTE — LACTATION NOTE
"This note was copied from a baby's chart.  Lactation Follow Up Note    Reason for visit/ call/ message:  Nuzzle/latch assist and 10 day supply check.     Supply:  Varied with maternal re-admission, then discharge to home. Trying to catch up on sleep, difficulty waking at night to pump.   Per phone application, daily totals 10oz (last week) to 17.5oz (yesterday), pumping 5-7x per day. Trying to get back on pumping schedule.     Significant changes (medications, equipment, comfort, etc):  Vanessa was readmitted to hospital last week, discharged to home Friday 11/22/24 (see previous lactation notes).   Maternal edema improving.   Teresita starting to work on oral feedings.     Hand Hugs/ Skin to Skin/ Oral Care/ Nuzzling/ Latching:  OT planned to work on first bottle with infant, mom at bedside, infant eager, so shifted to breastfeeding for this feeding session instead. Vanessa held Teresita in a cross cradle hold with good infant and breast support; Teresita was latched upon my arrival, exhibiting occasional sucks to breast. (Mom had pumped about 30 minutes prior to latching session.) After a few moments she pulled off breast, was eager to open mouth wide, cup tongue, and return to breast with beautiful asymmetrical latch; she again displayed a few sucks to breast off and on. Mom denied discomfort with latch. She eventually became sleepy at breast and moved skin to skin. We discussed shallow vs deep latch, aiming nipple for roof of mouth, how to comfortably removed her from nipple if shallow latch, burping techniques, ways to calm her if fussy at breast, benefits of skin to skin holding if sleepy at breast, typical premature infant feeding patterns.     Education given:  Reviewed recommended pumping schedule, giving herself some keeley with recent re-admission, importance of self care, benefits of hands on pumping (reviewed technique), hand expression, skin to skin holding.   Discussed \"water trick\" to help wake at night, drink large " amount of water with pumping session, see if bladder will wake her up to go to bathroom in night, then she is awake to pump.   Importance of self care, rest, hydration, focus on maternal health.     Plan:  Continue to monitor milk supply, follow up around day 15 postpartum or per request for latch assist.   NICU RN to discuss with NICU team plan for latching on pumped vs unpumped breast (for this session mom pumped about 30minutes prior)     OUSMANE Salguero, RN, IBCLC   Lactation Consultant  [facility]: [facility] [phone]  Office: [phone]

## 2024-11-26 ENCOUNTER — OFFICE VISIT (OUTPATIENT)
Dept: OBGYN | Facility: CLINIC | Age: 29
End: 2024-11-26
Attending: STUDENT IN AN ORGANIZED HEALTH CARE EDUCATION/TRAINING PROGRAM
Payer: COMMERCIAL

## 2024-11-26 VITALS
BODY MASS INDEX: 32.91 KG/M2 | DIASTOLIC BLOOD PRESSURE: 78 MMHG | HEIGHT: 61 IN | HEART RATE: 130 BPM | SYSTOLIC BLOOD PRESSURE: 120 MMHG | WEIGHT: 174.3 LBS

## 2024-11-26 DIAGNOSIS — O14.10 SEVERE PRE-ECLAMPSIA, ANTEPARTUM: ICD-10-CM

## 2024-11-26 PROCEDURE — 99213 OFFICE O/P EST LOW 20 MIN: CPT | Performed by: STUDENT IN AN ORGANIZED HEALTH CARE EDUCATION/TRAINING PROGRAM

## 2024-11-26 RX ORDER — HYDRALAZINE HYDROCHLORIDE 25 MG/1
25 TABLET, FILM COATED ORAL 4 TIMES DAILY
Qty: 100 TABLET | Refills: 1 | Status: SHIPPED | OUTPATIENT
Start: 2024-11-26

## 2024-11-26 NOTE — LETTER
"2024       RE: Vanessa Turcios  [id number] [address] [facility] [address]     Dear Colleague,    Thank you for referring your patient, Vanessa Turcios, to the [facility] at [facility]. Please see a copy of my visit note below.    [facility]    CC: Postpartum visit    Subjective:   Vanessa Turcios is a 29 year old  who presents for a postpartum blood pressure check.  She had a female infant by primary CS on 11/15/24 at 31w6d due to pre-eclampsia and was readmitted on  for severe range BP.  She was discharged on  on labetalol 600mg TID, hydralazine 50mg QID and nifedipine 60/60. Her BP at home have been 120s/80s recently. Currently taking hydralazine 50mg QID and nifedipine 60/60 and has not taken labetalol for several days. Denies sx of severe features. Sleeping 8-9 hours with breaks for pumping. Pumping increased amounts in last several days and baby worked on Aniboom yesterday. Lochia is minimal. No concerns about incision. Does have questions about contraception. Tried Mirena IUD and had frequent spotting for 2 years. Also has tried OCPs but had worse insomnia. Interested in non-hormonal options.      Objective:  /78   Pulse (!) 130   Ht 1.549 m (5' 1\")   Wt 79.1 kg (174 lb 4.8 oz)   LMP 04/10/2024   Breastfeeding Yes   BMI 32.93 kg/m   HR 76 on manual recheck (suspect equipment malfunction)  General: Well-appearing, no distress  Abdomen: Soft, nontender  Incision:steri-strips removed, skin well approximated, no discharge    Assessment/plan:  Vanessa Turcios is a 29 year old now  here for a BP check following PLTCS for preE with SF.     Postpartum visit:  -Contraception:discussed POPs, condoms, Paragard, Fem cup and diaphragm. NFP is not an option while not having regular menses. Discussed lactational amenorrhea and criteria for use. She will think about her options " further.   -Discussed return to intercourse and exercise  -Plans 6 week follow-up with  Ob/Gyn at [facility] with SF  - continue hydralazine 50mg QID and nifedipine 60/60. Refills of 25mg hydralazine given in anticipation of tapering in the next several weeks. Continue with HOPE-BP program and entering values in Mychart.     Chapis Liriano MD    A total of 28 minutes was spent by myself reviewing results, prior history, imaging, face-to-face with the patient and documentation.      Again, thank you for allowing me to participate in the care of your patient.      Sincerely,    Chapis Liriano MD

## 2024-11-26 NOTE — NURSING NOTE
Chief Complaint   Patient presents with    Post Partum Exam     11 days postpartum: DOD- 11/15/24

## 2024-11-26 NOTE — PATIENT INSTRUCTIONS
Thank you for trusting us with your care!   Please be aware, if you are on Mychart, you may see your results prior to your providers review. If labs are abnormal, we will call or message you on [employer] with a follow up plan.    If you need to contact us for questions about:  Symptoms, Scheduling & Medical Questions; Non-urgent (2-3 day response) [employer] message, Urgent (needing response today) [phone] (if after 3:30pm next day response)   Prescriptions: Please call your Pharmacy   Billing: Jenni [phone] or [facility]:[phone]

## 2024-11-27 ENCOUNTER — TELEPHONE (OUTPATIENT)
Dept: MATERNAL FETAL MEDICINE | Facility: CLINIC | Age: 29
End: 2024-11-27
Payer: COMMERCIAL

## 2024-11-27 NOTE — TELEPHONE ENCOUNTER
Home Observation of Postpartum Elevated BP (HOPE-BP) Program     Vanessa Turcios enrolled in the HOPE-BP Program on 11/19/2024, 8 days ago. Delivered on 11/15/2024. Diagnosis: Preeclampsia without severe features. Medication(s) prescribed: Nifedipine ER and Hydralazine .  Patient reported the following blood pressures in the past 72 hours:       11/20/2024 11/23/2024 11/24/2024 11/25/2024 11/26/2024 11/27/2024   [facility] Health Trends BP Review Flowsheet   Systolic (Patient Reported) 159     143  125     129  128  123     128  127     123  111     115    Diastolic (Patient Reported) 105     90  67     74  82  70     84  72     77  68     80        Patient-reported    Multiple values from one day are sorted in reverse-chronological order     Patient called HOPE line with c/o feeling lightheaded, dizzy, and palpitations following her chiropractor appointment. Her blood pressure is 111/68 with a HR of 110. Would like to discuss decreasing her medications. She is currently taking Nifedipine 60mg BID and Hydralazine 50mg QID. States she has taken so far one dose of Nifedipine and one dose of Hydralazine. Writer discussed with Dr. Queen, recommend decreasing Hydralazine to 50mg BID and continue with Nifedipine 60mg BID.

## 2024-11-27 NOTE — TELEPHONE ENCOUNTER
Patient returned call. Discussed decreasing her Hydralazine to 50mg BID, and continue with Nifedipine 60mg BID. Discussed if over the holiday she notes lower blood pressures or any further symptoms of hypotension can skip Hydralazine and writer will follow up with her Friday. Encouraged patient to continue checking her BP twice a day and enter into Mychart, patient VU.

## 2024-11-30 ENCOUNTER — LACTATION ENCOUNTER (OUTPATIENT)
Dept: OTHER | Facility: CLINIC | Age: 29
End: 2024-11-30

## 2024-12-01 NOTE — LACTATION NOTE
"This note was copied from a baby's chart.  Lactation Follow Up Note    Reason for visit/ call/ message:  Observe latching.    Supply:  70-100ml/pp every 3 hours.    Significant changes (medications, equipment, comfort, etc):  Baby [place] [facility] [place]. Vanessa plans to room in tonight.  Vanessa is using 21mm flanges with Medela pump and wonders if the fit is too big. She states her nipples feel slightly sore (but intact) despite appropriate suction pressure.  We discussed proper flange fitting. I gave her pumping lubricant and 18mm Lacteck flanges to try.     Hand Hugs/ Skin to Skin/ Oral Care/ Nuzzling/ Latching:  Vanessa holds baby skin to skin in supportive cross cradle with pillows and blanket rolls for support. Mom brings baby to left breast, aiming her nipple to baby's upper lip and nose to elicit a wide gape and deep latch. When baby opens wide, she leads with chin and latches, with more areola visible above nose than below. Vanessa gives breast support using a wide c-hold to compress to make a \"breast sandwich\". After latching, she does gentle compressions. Baby has intermittent, nutritive suckles. We discussed supportive hold, positioning, latch, breastfeeding patterns and infant driven feeding, breast support and compressions, skin to skin benefits, and timing of pumpings around breastfeedings. Baby had evidence of some milk transfer with milk present in mouth. Dyad transitioned to skin to skin while gavage feeding running.    Education given: Encouraged pumping after breast feeding session.    Plan:  Ongoing latching support. Start test weights when evidence of milk transfer is present.    Dali Stephens, RNC-DAKSHA, IBCLC   Lactation Consultant  [facility]: [facility] [phone]  Office: [phone]      "

## 2024-12-10 ENCOUNTER — TELEPHONE (OUTPATIENT)
Dept: OBGYN | Facility: CLINIC | Age: 29
End: 2024-12-10
Payer: COMMERCIAL

## 2024-12-10 NOTE — TELEPHONE ENCOUNTER
[facility] Call Center    Phone Message    May a detailed message be left on voicemail: yes     Reason for Call:  Pt is requesting [facility] referral for pt to be  by physical therapy. Also pt is requesting a sooner appt with [facility] for 6 week pp appt. Provider isnt available till pt 8th week. Please advise     Action Taken: Message routed to:  Other: [facility]    Travel Screening: Not Applicable

## 2024-12-11 NOTE — TELEPHONE ENCOUNTER
S/p  on 11/15/24.  No sooner availability with Dr. Garcia, pt is aware of this.    She is looking for a pelvic floor PT referral for post-birth recovery. Pended to the last provider who saw her in clinic, Dr. Liriano, for review.

## 2024-12-15 ENCOUNTER — LACTATION ENCOUNTER (OUTPATIENT)
Dept: OTHER | Facility: CLINIC | Age: 29
End: 2024-12-15

## 2024-12-15 NOTE — LACTATION NOTE
This note was copied from a baby's chart.  Lactation Follow Up Note    Reason for visit/ call/ message:  Day 30 supply check    Supply:  Pumping every 2-3 hours during day, every 4 hours at night and gets ~5oz each time  Not logging    Significant changes (medications, equipment, comfort, etc):  Latching for feedings when Vanessa is here  No comfort concerns    Hand Hugs/ Skin to Skin/ Oral Care/ Nuzzling/ Latching:  Teresita was doing well a few days ago but Vanessa has noticed that she's taken a step back in terms of oral intake at the breast.  Vanessa realizes that this is likely a 2 steps forward, 1 step back situation and she is content to keep working with Teresita so that she can continue to make progress, yet also give her time to rest as she learns and retains new skills    Education given:  Discussed milk production timeline and that Vanessa could try to decrease pumping sessions a bit if she's comfortable with her volume that she's currently producing.  She's not logging, so it was suggested that she start again so she can keep track of whether reducing pumping sessions is decreasing her overall milk volume vs maintaining  Encouraged Vanessa to keep practicing with Teresita to ensure that she keeps making progress while moving towards her eventual discharge    Plan:  Lactation will plan to see Vanessa/Teresita for a feeding sometime in the next few days.  Vanessa will let Teresita's nurse know when she would like someone to observe a feeding and offer any tips/suggestions that would be helpful    Wendy Posada RN, IBCLC   Lactation Consultant  [facility]: [facility] [phone]  Office: [phone]

## 2024-12-16 NOTE — PROGRESS NOTES
"INPATIENT PSYCHOLOGIAL ASSESSMENT  PSYCHOLOGY HEALTH and BEHAVIOR ASSESSMENT (98501)  WOMEN'S WELLBEING PROGRAM     DATE OF VISIT: 24  PATIENT AGE: 29  THOSE PRESENT: Ang Mendez (), clinical psychologist, Gisel Manzo, PhD,   START TIME: 9:05 am  END TIME: 10:00 am  LENGTH OF VISIT: 55 minutes  REFERRING PROVIDER: Sp Gonzales MD  LOCATION: [facility], Labor and Delivery recovery (7th floor)     SOURCES OF INFORMATION: Information contained in this report was obtained from Vanessa's electronic medical record, a conversation with referring provider Sp Gonzales MD,  and an interview/conversation with Vanessa.     REASON FOR REFERRAL/PRESENTING CONCERNS: Vanessa is a 29-year-old woman who was admitted 24 at 31 weeks and 2 days for expectant management of severe preeclampsia for duration of pregnancy. She delivered her daughter Teresita Marinelli by  section at 31 weeks and 6 days. Following delivery, she was discharged on 24. She was re-admitted for management of ongoing severe preeclampsia concerns on 24. Psychology was consulted for evaluation of psychological factors contributing to her physical symptom presentation.      PSYCHOLOGICAL HISTORY & SYMPTOMS: Today, Vanessa reports she is doing \"ok\". She is experiencing inappropriate thoughts and feelings of guilt and negative self-attributions that Teresita's premature birth was her fault, and doing her best to challenge these unhelpful and inaccurate thoughts and feelings. She shared the insight that she holds high standards for herself and she appreciates this may shape how she is thinking and feeling about her delivery experience. Similarly, she is challenging worries that her daughter may be \"damaged\" by medical interventions related to birth and care in NICU. 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 The " "delivery she experienced represents a rupture to the reproductive and parenting narrative she hoped to experience. Vanessa describes a strong and growing bond with her daughter.     No persisting low mood, anhedonia. No SI or safety concerns. No parent-to-infant attachment or bonding concerns.      Vanessa denies any psychiatric history of formally diagnosed or treated mental health concerns. She notes she has always identified as a \"perfectionist\" or more recently a \"recovering perfectionist.\"     Today, offered reflective listening, brief psychoeducation and coaching on importance of prioritizing sleep, self-care, and social support in coming days.      MEDICAL HISTORY: Please see medical record for detailed medical history. As noted above Vanessa's obstetric medical history is positive for preeclampsia. Detailed medical history not obtained from Vanessa today.     SOCIAL HISTORY: Vanessa and her  reported a close, loving, trusting relationship. Currently they live in [place], [place]. 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 The couple noted close, supportive relationships with a large number of immediate and extended family and friends who live nearby.       MENTAL STATUS EXAMINATION:  Appearance: Vanessa was dressed in hospital gown and sitting up in hospital bed during our meeting  Behavior/relationship to examiner/demeanor: Engaged, open, reflective   Motor activity/EPS: Within normal limits  Speech rate:  Within normal limits  Speech volume:  Within normal limits  Speech articulation:  Within normal limits  Speech coherence:  Within normal limits  Speech spontaneity:  Within normal limit  Mood (subjective report): \"ok\"    Affect (objective appearance): tearful at moments recounting reproductive rupture, appropriate, within normal limits  Thought Process (Associations):  Goal directed  Thought process (Rate):  Within normal limits  Thought content: None  Abnormal Perception: None  Insight:  Within normal limits  Judgment:  Within " "normal limit     ASSESSMENT:  Vanessa is a 29-year-old female with no previous psychiatric history, who delivered her daughter at 31 weeks and 6 days a few days ago in the context of preeclampsia concerns. Today she describes feeling \"ok\" with intermittent thoughts and feelings of guilt and worries. She denies any suicidal ideation or safety concerns. Given her history and current concerns Vanessa may benefit from being followed outpatient by psychology to provide support in processing thoughts and feelings related to premature delivery, and help to provide monitoring and support to manage for any emerging or worsening psychological symptoms or concerns. Today I offered information on how psychological services may provide support following Vanessa's discharge. We discussed the importance of sleep, self-care, and social support as Vanessa transitions to the postpartum period and new parenting role. I encouraged Vanessa to reach out to schedule additional appointments via [employer].       DIAGNOSIS:  Hypertension in pregnancy, severe pre-eclampsia, delivered, currently admitted (O14.94)     TREATMENT PLAN/RECOMMENDATIONS:  Psychology will continue to offer support to Vanessa during hospitalization to provide support as needed. She anticipates discharge in the next day, and psychology will follow outpatient. This provider offered upcoming openings and provided my direct contact information.      Today we discussed benefits of continuing to lean on support and love of family and friends, and prioritize sleep and self-care, to support Vanessa's mental health and wellbeing while transitioning to the postpartum period and new parenting role.      It was my pleasure to support Vanessa today. Please do not hesitate to contact me in the future for any further questions or concerns related to her care.     Gisel Manzo, PhD,   Department of Psychology  Women's Wellbeing Program   [phone]  "

## 2024-12-18 ENCOUNTER — TELEPHONE (OUTPATIENT)
Dept: MATERNAL FETAL MEDICINE | Facility: CLINIC | Age: 29
End: 2024-12-18
Payer: COMMERCIAL

## 2024-12-18 NOTE — LETTER
HOPE-BP Program  Home Observation of Postpartum Elevated Blood Pressure Program    Congratulations, Vanessa, for completing the HOPE-BP program!  We have sent {BP provider / PCP:541243} a clinical summary of your participation. {CONTACT YOUR PROVIDER FOR MED RF / APPT (Optional):587219}     Having had a hypertensive disorder of pregnancy increases your lifetime risk of developing heart disease, high blood pressure, type 2 diabetes, and obesity, as well as developing a hypertensive disorder of pregnancy with future pregnancies. You should discuss these risks with your OB and primary care provider.     We appreciated partnering with you during this time!     Sincerely,   The HOPE-BP monitoring team

## 2024-12-18 NOTE — TELEPHONE ENCOUNTER
Home Observation of Postpartum Elevated BP (HOPE-BP) Program     Vanessa Turcios enrolled in the HOPE-BP Program on 11/19/2024, 29 days ago. Delivered on 11/15/2024. Diagnosis: Preeclampsia with severe features. Medication(s) prescribed: Nifedipine ER.  Patient reported the following blood pressures in the past 72 hours:       11/27/2024 11/28/2024 11/29/2024 11/30/2024 12/1/2024 12/2/2024 12/4/2024   [facility] Health Trends BP Review Flowsheet   Systolic (Patient Reported) 111     115  105  118  120     118  125  131  121    Diastolic (Patient Reported) 68     80  79  77  82     85  82  89  83        Patient-reported    Multiple values from one day are sorted in reverse-chronological order     Spoke with patient , Vanessa apologizes but has been too busy and forgets to enter blood pressures and states she stopped taking all of her BP medication about 2 weeks ago. Vanessa states her readings were 120/70's and she was too overwhelmed with the tasks, so stopped taking BP's and meds. Vanessa declines HARRY/vision changes/SOB/chest pains/edema.  PT requesting to un enroll from the HOPE BP program and if she is having any concerns or elevated BP she should contact her primary or OB provider.   Encourage patient to continue checking her BP daily and report any elevated readings >140/90 or if she develops any symptoms. PT VU and agreeable to plan.     Mary Foster RN

## 2024-12-18 NOTE — LETTER
HOPE-BP Program  Home Observation of Postpartum Elevated Blood Pressure Program      Re:  Vanessa Turcios   : 1995   DOS: 24-12    Dear Providers:    Thank you for referring your patient to the HOPE-BP Program, our postpartum home blood pressure monitoring program.     We are notifying you that Vanessa has been unenrolled due to patient request. Vanessa completed 2 weeks of monitoring. During this time, her blood pressures ranged from 105 to 131 systolic and 77 to 89 diastolic. Vanessa's most recent blood pressure is normotensive. She self discontinued her medication around 24.     Patient discharged from the hospital on Labetalol 800mg TID and Nifedipine ER 60mg BID and required multiple dose adjustment(s) during the program and was readmitted POD #6 for Pre Eclampsia with severe features. Vanessa is currently not on antihypertensive medication.    Vanessa has been instructed to contact your office with any questions or concerns related to her blood pressure going forward.     Even though this patient was unenrolled from the program they are still at risk. Patients who have had a hypertensive disorder of pregnancy are at higher risk of developing high blood pressure and cardiovascular disease. This is especially true for those who already had or who have developed chronic hypertension. The American College of Cardiology and American Heart Association (ACC/AHA) classify a history of hypertensive disorders of pregnancy as a major risk factor for developing cardiovascular disease. This includes the risk of heart failure, stroke, valvular, and ischemic heart disease, with high rates of adverse cardiac events as early as the first year postpartum (occurring much earlier than previously reported/expected).  Due to this increased risk, we would recommend the following:    Ongoing ambulatory and in-office blood pressure surveillance with management per current ACC/AHA recommendations (goal < 120/80 mmHg)  Medical and  family history with calculation of 10-year CVD risk (history of HDP is considered a risk-enhancing factor; calculate risk with this tool)   Lipid assessment (initially once 12 weeks postpartum and post-lactation) and then annually. Follow ACC/AHA recommendations to initiate statin therapy for primary prevention of atherosclerotic cardiovascular disease (ASCVD). History of HDP is considered a risk-enhancing factor)  Annually screening for diabetes using hemoglobin A1c  Review of lifestyle modifications including dietary counseling (such as the DASH diet) and exercise recommendations (moderate exercise at least 30 minutes per day 5 times per week)  Lifestyle counseling and weight loss strategies should be discussed for those who are overweight or obese in addition to weight maintenance for those unable to achieve weight loss   Smoking cessation counseling if applicable   For patients planning on future pregnancies - discuss contraception, ideal interpregnancy interval of 18 months, review increased risk of hypertensive disorders of pregnancy in future pregnancies and the role of aspirin for risk reduction  In patients who have developed or go on to develop chronic hypertension, evaluation of cardiac function (EKG +/- Echocardiogram), retinopathy, and renal disease should be pursued especially if hypertension is longstanding or uncontrolled     It was a pleasure to partner with you and your patient, Vanessa, during this monitoring program. Please do not hesitate to reach out with questions at [phone].    Sincerely,   [facility] monitoring team

## 2024-12-18 NOTE — LETTER
HOPE-BP Program  Home Observation of Postpartum Elevated Blood Pressure Program    Vanessa, this message is to notify you that you have been unenrolled from the HOPE-BP program due to patient request. We have let your OB provider Dr. Garcia and your PCP Lilia Felder CNP  know to ensure you re scheduled for appropriate follow-up. Having a hypertensive disorder of pregnancy increases your lifetime risk of developing heart disease, high blood pressure, type 2 diabetes, and obesity, as well as developing a hypertensive disorder of pregnancy with future pregnancies. You should discuss these risks with your OB or primary care provider.     We appreciate having had the opportunity to partner with you during this time and we are sorry it didn t work out for us to partner longer. We wish you all the best!    Sincerely,   The [facility]OPE[facility]BP monitoring team

## 2024-12-31 ENCOUNTER — LACTATION ENCOUNTER (OUTPATIENT)
Dept: OTHER | Facility: CLINIC | Age: 29
End: 2024-12-31

## 2024-12-31 ENCOUNTER — MYC MEDICAL ADVICE (OUTPATIENT)
Dept: OBGYN | Facility: CLINIC | Age: 29
End: 2024-12-31
Payer: COMMERCIAL

## 2024-12-31 DIAGNOSIS — N63.10 MASS OF RIGHT BREAST, UNSPECIFIED QUADRANT: Primary | ICD-10-CM

## 2024-12-31 NOTE — LACTATION NOTE
This note was copied from a baby's chart.  Lactation Follow Up Note    Reason for visit/ call/ message:  Follow-up on flange sizing, questions about flange inserts for hands free pump, and pea sized lump in right breast.    Supply:  Pumping about 5 oz per pump. She has not been pumping at night but recently has tried using wearable Medela pump at night as it is easy to keep set up at bedside. Reports that she only gets about 2.5 oz with the wearable pump but this is better than not pumping at all overnight.    Significant changes (medications, equipment, comfort, etc):  Still has the pea sized lump in the right breast. Has her 6 week postpartum appointment in 2 weeks. Encouraged to message provider to see if follow-up is warranted sooner.  Discovered that she has accidentally been using a 24 mm flange on one side and a 21 mm flange on the other side both at home and at the hospital.    Hand Hugs/ Skin to Skin/ Oral Care/ Nuzzling/ Latching:  Vanessa reports that Teresita did really well with breastfeeding yesterday. She did 3 feedings where she took over goal volume and did not require supplementation.    Education given:  Observed pumping session and determined that 21 mm flanges are the proper size. Since her wearable pump already has 21 mm flanges, a flange insert would make it too small.  Discussed options for lifetime pump once rental pump is returned.    Plan:  Lactation available for support as needed.    Millie Gonzales RN, IBCLC   Lactation Consultant  [place]: [facility] [phone]  Office: [phone]

## 2025-01-01 ENCOUNTER — LACTATION ENCOUNTER (OUTPATIENT)
Dept: OTHER | Facility: CLINIC | Age: 30
End: 2025-01-01

## 2025-01-01 NOTE — LACTATION NOTE
This note was copied from a baby's chart.  Lactation Follow Up Note    Reason for visit/ call/ message:  Discuss breast/bottle combo feeding plan    Supply:  Stable, see previous lactation note.    Significant changes (medications, equipment, comfort, etc):  Did not discuss.    Hand Hugs/ Skin to Skin/ Oral Care/ Nuzzling/ Latching:  Teresita had just finished breastfeeding on both sides on arrival to room. She transferred 44 mL per scale.     Education given:  Discussed logistics of breast/bottle combo feeding plan. Teresita typically takes about 40-45 mL at breast so suggested offering 10-15 mL with bottle after breastfeeding.  Discussed time management tips when triple feeding. Limit breastfeeding to 10-15 mins per side of active feeding, followed by supplement by bottle, then pumping.    Plan:  Lactation will continue to provide support.    Millie Gonzales RN, IBCLC   Lactation Consultant  [place] [phone]  Office: [phone]

## 2025-01-02 ENCOUNTER — LACTATION ENCOUNTER (OUTPATIENT)
Dept: OTHER | Facility: CLINIC | Age: 30
End: 2025-01-02

## 2025-01-02 NOTE — LACTATION NOTE
This note was copied from a baby's chart.  Lactation Follow Up Note    Reason for visit/ call/ message:  Vanessa had questions about Teresita's stomach upset and whether her maternal diet would be causing the problem  Lingering pea sized lump in R breast    Supply:  Supply is consistent at ~5oz each time, despite lump in breast    Significant changes (medications, equipment, comfort, etc):  Sent [employer] message to her provider, has order for breast U/S and mammogram, not scheduled yet    Hand Hugs/ Skin to Skin/ Oral Care/ Nuzzling/ Latching:  Latching well, no concerns    Education given:  Discussed that broccoli, beans, brussels sprouts could be causing discomfort for Teresita, but her dietary supplements are more likely to be the cause.  Since they are changing to a different form of supplements soon (iron, multivitamin), Vanessa shouldn't change anything yet with her diet and should instead wait to see if Teresita seems better after this change    Plan:  Lactation to continue to follow  Hopeful that Teresita may be discharged this weekend!    Wendy Posada RN, IBCLC   Lactation Consultant  [place] [phone]  Office: [phone]

## 2025-03-19 NOTE — TELEPHONE ENCOUNTER
11/15/24: Delivered via C/S  PreE with severe features  Discharged yesterday--[place] BP program    168/105  States cuff does tend to read a little high  Denies any s/s of headaches, blurred vision, URQ pain      NIFEdipine ER OSMOTIC (ADALAT CC) 60 MG 24 hr tablet  60 mg, 2 TIMES DAILY 0 ordered          Summary: Take 1 tablet (60 mg) by mouth 2 times daily., Disp-90 tablet, R-0, E-Prescribe          labetalol (NORMODYNE) 200 MG tablet  800 mg, EVERY 8 HOURS 0 ordered          Summary: Take 4 tablets (800 mg) by mouth every 8 hours., Disp-300 tablet, R-0, E-Prescribe          hydrochlorothiazide (HYDRODIURIL) 25 MG tablet  25 mg, DAILY 0 ordered          Summary: Take 1 tablet (25 mg) by mouth daily., Disp-45 tablet, R-0, E-Prescribe          Took all medications 30 minutes ago.BP was also taken at that time.  Pt repeated BP with RN on phone  163/108  Feeling a little light head-headed right now.    Pt will proceed to ER at this time.  Did update Dr Emerson gardner provider, will try to connect with [facility]OPE program.  Pt verbalized understanding, in agreement with plan, and voiced no further questions.  José Hoff RN on 11/20/2024 at 11:37 AM   WE [facility]         Called social work for cab ride home.

## (undated) DEVICE — GLOVE ESTEEM POWDER FREE SMT 7.0  2D72PT70

## (undated) DEVICE — SU MONOCRYL 0 CTB-1 36" YB946

## (undated) DEVICE — CATH TRAY FOLEY 16FR BARDEX W/DRAIN BAG STATLOCK 300316A

## (undated) DEVICE — SU VICRYL 3-0 CTX 36" UND J980H

## (undated) DEVICE — STOCKING SLEEVE COMPRESSION CALF LG

## (undated) DEVICE — GLOVE PROTEXIS BLUE W/NEU-THERA 7.5  2D73EB75

## (undated) DEVICE — SU MONOCRYL 0 CT-1 36" Y346H

## (undated) DEVICE — SOL NACL 0.9% IRRIG 1000ML BOTTLE 07138-09

## (undated) DEVICE — SU MONOCRYL 4-0 PS-2 18" UND Y496G

## (undated) DEVICE — PREP CHLORAPREP 26ML TINTED ORANGE  260815

## (undated) DEVICE — PACK C-SECTION LF PL15OTA83B

## (undated) DEVICE — SU VICRYL 0 CT-1 36" J346H

## (undated) DEVICE — ESU PENCIL W/SMOKE EVAC NEPTUNE STRYKER 0703-046-000

## (undated) DEVICE — SOL WATER IRRIG 1000ML BOTTLE 07139-09

## (undated) DEVICE — STRAP KNEE/BODY 31143004

## (undated) RX ORDER — FENTANYL CITRATE 50 UG/ML
INJECTION, SOLUTION INTRAMUSCULAR; INTRAVENOUS
Status: DISPENSED
Start: 2024-11-15

## (undated) RX ORDER — MORPHINE SULFATE 1 MG/ML
INJECTION, SOLUTION EPIDURAL; INTRATHECAL; INTRAVENOUS
Status: DISPENSED
Start: 2024-11-15

## (undated) RX ORDER — ONDANSETRON 2 MG/ML
INJECTION INTRAMUSCULAR; INTRAVENOUS
Status: DISPENSED
Start: 2024-11-15

## (undated) RX ORDER — FENTANYL CITRATE-0.9 % NACL/PF 10 MCG/ML
PLASTIC BAG, INJECTION (ML) INTRAVENOUS
Status: DISPENSED
Start: 2024-11-15

## (undated) RX ORDER — KETOROLAC TROMETHAMINE 30 MG/ML
INJECTION, SOLUTION INTRAMUSCULAR; INTRAVENOUS
Status: DISPENSED
Start: 2024-11-15

## (undated) RX ORDER — BUPIVACAINE HYDROCHLORIDE 2.5 MG/ML
INJECTION, SOLUTION EPIDURAL; INFILTRATION; INTRACAUDAL
Status: DISPENSED
Start: 2024-11-15